# Patient Record
Sex: FEMALE | Race: WHITE | NOT HISPANIC OR LATINO | Employment: OTHER | URBAN - METROPOLITAN AREA
[De-identification: names, ages, dates, MRNs, and addresses within clinical notes are randomized per-mention and may not be internally consistent; named-entity substitution may affect disease eponyms.]

---

## 2017-01-02 ENCOUNTER — THERAPY VISIT (OUTPATIENT)
Dept: CHIROPRACTIC MEDICINE | Facility: CLINIC | Age: 62
End: 2017-01-02
Payer: COMMERCIAL

## 2017-01-02 DIAGNOSIS — F33.0 MAJOR DEPRESSIVE DISORDER, RECURRENT EPISODE, MILD (H): ICD-10-CM

## 2017-01-02 DIAGNOSIS — R73.09 OTHER ABNORMAL GLUCOSE: ICD-10-CM

## 2017-01-02 DIAGNOSIS — M54.50 LUMBAGO: ICD-10-CM

## 2017-01-02 DIAGNOSIS — M99.02 SEGMENTAL DYSFUNCTION OF THORACIC REGION: ICD-10-CM

## 2017-01-02 DIAGNOSIS — M99.03 SEGMENTAL DYSFUNCTION OF LUMBAR REGION: Primary | ICD-10-CM

## 2017-01-02 DIAGNOSIS — E78.5 HYPERLIPIDEMIA LDL GOAL <130: ICD-10-CM

## 2017-01-02 DIAGNOSIS — M99.04 SEGMENTAL DYSFUNCTION OF SACRAL REGION: ICD-10-CM

## 2017-01-02 LAB
CHOLEST SERPL-MCNC: 207 MG/DL
DEPRECATED CALCIDIOL+CALCIFEROL SERPL-MC: 61 UG/L (ref 20–75)
HBA1C MFR BLD: 5.3 % (ref 4.3–6)
HDLC SERPL-MCNC: 56 MG/DL
LDLC SERPL CALC-MCNC: 131 MG/DL
NONHDLC SERPL-MCNC: 151 MG/DL
TRIGL SERPL-MCNC: 102 MG/DL

## 2017-01-02 PROCEDURE — 83036 HEMOGLOBIN GLYCOSYLATED A1C: CPT | Mod: QW | Performed by: FAMILY MEDICINE

## 2017-01-02 PROCEDURE — 80061 LIPID PANEL: CPT | Mod: 90 | Performed by: FAMILY MEDICINE

## 2017-01-02 PROCEDURE — 99000 SPECIMEN HANDLING OFFICE-LAB: CPT | Performed by: FAMILY MEDICINE

## 2017-01-02 PROCEDURE — 82306 VITAMIN D 25 HYDROXY: CPT | Mod: 90 | Performed by: FAMILY MEDICINE

## 2017-01-02 PROCEDURE — 36415 COLL VENOUS BLD VENIPUNCTURE: CPT | Performed by: FAMILY MEDICINE

## 2017-01-02 PROCEDURE — 98941 CHIROPRACT MANJ 3-4 REGIONS: CPT | Mod: AT | Performed by: CHIROPRACTOR

## 2017-01-02 NOTE — PROGRESS NOTES
"Visit #:  2 of 8 based on treatment plan 11/21/2016 - [facility]    Subjective:  Cecile Mays is a 60 year old female who is seen in f/u up for:        Segmental dysfunction of lumbar region  Segmental dysfunction of thoracic region  Lumbago  Segmental dysfunction of sacral region.     Since last visit on 12/28/2016,  Cecile Mays reports the following changes: Patient presents and states that she is sore today, especially in her rib area. She is requesting the [facility] today, and would like work around to the side. Her pain is rated 0-3/10. She is getting better, but she \"wants the pain gone.\"     Objective:  The following was observed:    P: right lower ribs, right SI joint    A: static palpation demonstrates intersegmental asymmetry, as noted    R: motion palpation notes restricted motion    T: localized muscle spasm at: Intercostal, Lumbar erector spine, Piriformis and Traps R>>L      Assessment:    Segmental spinal dysfunction/restrictions found at:  T5 RR,  LRR - mobs  T9 RR, LRR with right 9th rib  L4 LR, RRR  Right SI posterior    Diagnoses:      1. Segmental dysfunction of lumbar region    2. Segmental dysfunction of thoracic region    3. Lumbago    4. Segmental dysfunction of sacral region        Patient's condition:  Patient had restrictions pre-manipulation and Patient had decreased motion prior to manipulation    Treatment effectiveness:  Post manipulation there is better intersegmental movement and Patient claims to feel looser post manipulation      Procedures:  CMT:  43619 Chiropractic manipulative treatment 3-4 regions performed   Thoracic: Diversified,  Mobilization, T5, T9 Prone  Lumbar: Drop Table, L4, Prone  Pelvis: Drop Table, PSIS Right , Prone    Modalities:  [facility] to right lower ribs - every treatment at this point    Therapeutic procedures:  Continue with Bruegger's Relief.    MRI report form 6/6/2016:   There is minimal spinal canal narrowing " at the T9-T10 level due to a  right paracentral disc herniation. There is no other spinal canal or  neural foraminal narrowing of the thoracic spine.           Note: disc herniation at T9-10 on right - could be contributory for pain.    Prognosis: Good    Progress towards Goals: Patient has met the goal of decreased pain and hypertonicity throughout spine.     Response to Treatment:   Minimal improvements with care. Patient notes that she feels comfortable with Dr. Tai and is getting the education she needs to understand her health more, which is the most important part for her right now.      Recommendations:    Instructions:ice 20 minutes every other hour as needed and stretch as instructed at visit    Follow-up:  Return to care Friday. Continue with treatment plan. Patient feels she has gotten the most relief with [facility] - continue with each treatment.

## 2017-01-03 ENCOUNTER — TRANSFERRED RECORDS (OUTPATIENT)
Dept: HEALTH INFORMATION MANAGEMENT | Facility: CLINIC | Age: 62
End: 2017-01-03

## 2017-01-06 ENCOUNTER — THERAPY VISIT (OUTPATIENT)
Dept: CHIROPRACTIC MEDICINE | Facility: CLINIC | Age: 62
End: 2017-01-06
Payer: COMMERCIAL

## 2017-01-06 DIAGNOSIS — M99.03 SEGMENTAL DYSFUNCTION OF LUMBAR REGION: Primary | ICD-10-CM

## 2017-01-06 DIAGNOSIS — M54.50 LUMBAGO: ICD-10-CM

## 2017-01-06 DIAGNOSIS — M99.02 SEGMENTAL DYSFUNCTION OF THORACIC REGION: ICD-10-CM

## 2017-01-06 DIAGNOSIS — M99.04 SEGMENTAL DYSFUNCTION OF SACRAL REGION: ICD-10-CM

## 2017-01-06 PROCEDURE — 98941 CHIROPRACT MANJ 3-4 REGIONS: CPT | Mod: AT | Performed by: CHIROPRACTOR

## 2017-01-06 NOTE — PROGRESS NOTES
Visit #:  3 of 8 based on treatment plan 11/21/2016 - [facility]    Subjective:  Cecile Mays is a 60 year old female who is seen in f/u up for:        Segmental dysfunction of lumbar region  Segmental dysfunction of thoracic region  Lumbago  Segmental dysfunction of sacral region.     Since last visit on 1/2/2017,  Cecile Mays reports the following changes: Patient presents and states her lower back pain is so much better. Her right rib pain feels like it is moving down. She notes that she doesn't even think about her pain anymore. At night, she wakes up less, and is able to get back to sleep much better/quicker. She is very stressed about her MS diagnosis, and feels like when a doctor asks if she has symptoms, she then gets them. She mostly just has numbness in her left face. She has been off of Gabapentin because she doesn't like all of the side effects. She continues to do yoga.     Objective:  The following was observed:    P: right lower ribs, right SI joint    A: static palpation demonstrates intersegmental asymmetry, as noted    R: motion palpation notes restricted motion    T: localized muscle spasm at: Intercostal, Lumbar erector spine, Piriformis and Traps R>>L      Assessment:    Segmental spinal dysfunction/restrictions found at:  T7 RR,  LRR - mobs  T10 RR, LRR with right 10th rib  L4 LR, RRR   Right SI posterior    Diagnoses:      1. Segmental dysfunction of lumbar region    2. Segmental dysfunction of thoracic region    3. Lumbago    4. Segmental dysfunction of sacral region        Patient's condition:  Patient had restrictions pre-manipulation and Patient had decreased motion prior to manipulation    Treatment effectiveness:  Post manipulation there is better intersegmental movement and Patient claims to feel looser post manipulation      Procedures:  CMT:  28037 Chiropractic manipulative treatment 3-4 regions performed   Thoracic: Diversified,  Mobilization, T7, T10  "Prone  Lumbar: Drop Table, L4, Prone  Pelvis: Drop Table, PSIS Right , Prone    Modalities:  [facility] to right lower ribs - every treatment at this point    Therapeutic procedures:  Continue with Bruegger's Relief.    MRI report form 6/6/2016:   There is minimal spinal canal narrowing at the T9-T10 level due to a  right paracentral disc herniation. There is no other spinal canal or  neural foraminal narrowing of the thoracic spine.           Note: disc herniation at T9-10 on right - could be contributory for pain.    Prognosis: Good    Progress towards Goals: Patient has met the goal of decreased pain and hypertonicity throughout spine.     Response to Treatment:   Patient has made great improvement with care. She states that her back and ribs feel \"better than they have in years.\"      Recommendations:    Instructions:ice 20 minutes every other hour as needed and stretch as instructed at visit    Follow-up:  Return to care twice next week. Patient feels she has gotten the most relief with [facility] - continue with each treatment. Monitor bruising effects.      "

## 2017-01-10 ENCOUNTER — THERAPY VISIT (OUTPATIENT)
Dept: CHIROPRACTIC MEDICINE | Facility: CLINIC | Age: 62
End: 2017-01-10
Payer: COMMERCIAL

## 2017-01-10 DIAGNOSIS — M99.04 SEGMENTAL DYSFUNCTION OF SACRAL REGION: ICD-10-CM

## 2017-01-10 DIAGNOSIS — M99.02 SEGMENTAL DYSFUNCTION OF THORACIC REGION: ICD-10-CM

## 2017-01-10 DIAGNOSIS — M54.50 LUMBAGO: ICD-10-CM

## 2017-01-10 DIAGNOSIS — M99.03 SEGMENTAL DYSFUNCTION OF LUMBAR REGION: Primary | ICD-10-CM

## 2017-01-10 PROCEDURE — 98941 CHIROPRACT MANJ 3-4 REGIONS: CPT | Mod: AT | Performed by: CHIROPRACTOR

## 2017-01-10 NOTE — PROGRESS NOTES
Visit #:  4 of 8 based on treatment plan 11/21/2016 - [facility]    Subjective:  Cecile Mays is a 60 year old female who is seen in f/u up for:        Segmental dysfunction of lumbar region  Segmental dysfunction of thoracic region  Lumbago  Segmental dysfunction of sacral region.     Since last visit on 1/2/2017,  Cecile Mays reports the following changes: Patient presents and states her lower back is really sore today.       Objective:  The following was observed:    P: right lower ribs, right SI joint    A: static palpation demonstrates intersegmental asymmetry, as noted    R: motion palpation notes restricted motion    T: localized muscle spasm at: Intercostal, Lumbar erector spine, Piriformis and Traps R>>L Right QL was tender and hypertonic today.      Assessment:    Segmental spinal dysfunction/restrictions found at:  T7 RR,  LRR - mobs  T10 RR, LRR with right 10th rib  L3 RR, LRR  L5 LR, RRR  Right SI posterior    Diagnoses:      1. Segmental dysfunction of lumbar region    2. Segmental dysfunction of thoracic region    3. Lumbago    4. Segmental dysfunction of sacral region        Patient's condition:  Patient had restrictions pre-manipulation and Patient had decreased motion prior to manipulation    Treatment effectiveness:  Post manipulation there is better intersegmental movement and Patient claims to feel looser post manipulation      Procedures:  CMT:  40470 Chiropractic manipulative treatment 3-4 regions performed   Thoracic: Diversified,  Mobilization, T7, T10 Prone  Lumbar: Drop Table, L3, L5 Prone  Pelvis: Drop Table, PSIS Right , Prone    Modalities:  [facility] to right lower ribs - every treatment at this point    Therapeutic procedures:  Continue with Bruegger's Relief.    MRI report form 6/6/2016:   There is minimal spinal canal narrowing at the T9-T10 level due to a  right paracentral disc herniation. There is no other spinal canal or  neural foraminal  "narrowing of the thoracic spine.           Note: disc herniation at T9-10 on right - could be contributory for pain.    Prognosis: Good    Progress towards Goals: Patient has met the goal of decreased pain and hypertonicity throughout spine.     Response to Treatment:   Patient has made great improvement with care. She states that her back and ribs feel \"better than they have in years.\"      Recommendations:    Instructions:ice 20 minutes every other hour as needed and stretch as instructed at visit    Follow-up:  Return to care Friday. Patient feels she has gotten the most relief with [facility] - continue with each treatment. Monitor bruising effects.      "

## 2017-01-13 ENCOUNTER — THERAPY VISIT (OUTPATIENT)
Dept: CHIROPRACTIC MEDICINE | Facility: CLINIC | Age: 62
End: 2017-01-13
Payer: COMMERCIAL

## 2017-01-13 DIAGNOSIS — M99.02 SEGMENTAL DYSFUNCTION OF THORACIC REGION: ICD-10-CM

## 2017-01-13 DIAGNOSIS — M99.03 SEGMENTAL DYSFUNCTION OF LUMBAR REGION: Primary | ICD-10-CM

## 2017-01-13 DIAGNOSIS — M54.50 LUMBAGO: ICD-10-CM

## 2017-01-13 DIAGNOSIS — M99.04 SEGMENTAL DYSFUNCTION OF SACRAL REGION: ICD-10-CM

## 2017-01-13 PROCEDURE — 98941 CHIROPRACT MANJ 3-4 REGIONS: CPT | Mod: AT | Performed by: CHIROPRACTOR

## 2017-01-13 NOTE — PROGRESS NOTES
Visit #:  5 of 8 based on treatment plan 11/21/2016 - [facility]    Subjective:  Cecile Mays is a 60 year old female who is seen in f/u up for:        Segmental dysfunction of lumbar region  Segmental dysfunction of thoracic region  Lumbago  Segmental dysfunction of sacral region.     Since last visit on 1/10/2017,  Cecile Mays reports the following changes: Patient presents and states that she is feeling very tight this morning. She just finished yoga. She is sleeping better, but is definitely moving better. Her pain is rated 2/10. She is using less tylenol and pain medication.       Objective:  The following was observed:    P: right lower ribs, right SI joint    A: static palpation demonstrates intersegmental asymmetry, as noted    R: motion palpation notes restricted motion    T: localized muscle spasm at: Intercostal, Lumbar erector spine, Piriformis and Traps R>>L Right QL was tender and hypertonic today.      Assessment:    Segmental spinal dysfunction/restrictions found at:  T7 RR,  LRR - mobs  T10 RR, LRR with right 10th rib  L5 LR, RRR  Right SI posterior    Diagnoses:      1. Segmental dysfunction of lumbar region    2. Segmental dysfunction of thoracic region    3. Lumbago    4. Segmental dysfunction of sacral region        Patient's condition:  Patient had restrictions pre-manipulation and Patient had decreased motion prior to manipulation    Treatment effectiveness:  Post manipulation there is better intersegmental movement and Patient claims to feel looser post manipulation      Procedures:  CMT:  60152 Chiropractic manipulative treatment 3-4 regions performed   Thoracic: Diversified,  Mobilization, T7, T10 Prone  Lumbar: Drop Table, L5 Prone  Pelvis: Drop Table, PSIS Right , Prone    Modalities:  [facility] to right lower ribs - every treatment at this point    Therapeutic procedures:  Continue with Bruegger's Relief.    MRI report form 6/6/2016:   There is minimal  "spinal canal narrowing at the T9-T10 level due to a  right paracentral disc herniation. There is no other spinal canal or  neural foraminal narrowing of the thoracic spine.           Note: disc herniation at T9-10 on right - could be contributory for pain.    Prognosis: Good    Progress towards Goals: Patient has met the goal of decreased pain and hypertonicity throughout spine.     Response to Treatment:   Patient has made great improvement with care. She states that her back and ribs feel \"better than they have in years.\"      Recommendations:    Instructions:ice 20 minutes every other hour as needed and stretch as instructed at visit    Follow-up:  Return to care next week. Patient feels she has gotten the most relief with [facility] - continue with each treatment. Monitor bruising effects.      "

## 2017-01-18 ENCOUNTER — THERAPY VISIT (OUTPATIENT)
Dept: CHIROPRACTIC MEDICINE | Facility: CLINIC | Age: 62
End: 2017-01-18
Payer: COMMERCIAL

## 2017-01-18 DIAGNOSIS — M54.50 LUMBAGO: ICD-10-CM

## 2017-01-18 DIAGNOSIS — M99.02 SEGMENTAL DYSFUNCTION OF THORACIC REGION: ICD-10-CM

## 2017-01-18 DIAGNOSIS — M99.04 SEGMENTAL DYSFUNCTION OF SACRAL REGION: ICD-10-CM

## 2017-01-18 DIAGNOSIS — M99.03 SEGMENTAL DYSFUNCTION OF LUMBAR REGION: Primary | ICD-10-CM

## 2017-01-18 PROCEDURE — 98941 CHIROPRACT MANJ 3-4 REGIONS: CPT | Mod: AT | Performed by: CHIROPRACTOR

## 2017-01-18 NOTE — PROGRESS NOTES
"Visit #:  5 of 8 based on treatment plan 11/21/2016 - [facility]    Subjective:  Cecile Mays is a 60 year old female who is seen in f/u up for:        Segmental dysfunction of lumbar region  Segmental dysfunction of thoracic region  Lumbago  Segmental dysfunction of sacral region.     Since last visit on 1/10/2017,  Cecile Mays reports the following changes: Patient presents and states that she is feeling pretty good, she really only has the 2 spots that bother her, one on her spine and her rib. She feels like she is \"always the same now.\" Touching the spot, or laying on her back, yoga, all make the pain stir up a bit. She continues to do yoga MWF. She is sore afterwards but it feels like a good soreness. She also has been swimming about once per week which is helpful.       Objective:  The following was observed:    P: right lower ribs, right SI joint    A: static palpation demonstrates intersegmental asymmetry, as noted    R: motion palpation notes restricted motion    T: localized muscle spasm at: Intercostal, Lumbar erector spine, Piriformis and Traps R>>L Right QL was tender and hypertonic today.      Assessment:    Segmental spinal dysfunction/restrictions found at:  T7 RR,  LRR - mobs  T9 RR, LRR with right 10th rib  L4 RR, LRR  Right SI posterior  Left sacrum posterior    Diagnoses:      1. Segmental dysfunction of lumbar region    2. Segmental dysfunction of thoracic region    3. Lumbago    4. Segmental dysfunction of sacral region        Patient's condition:  Patient had restrictions pre-manipulation and Patient had decreased motion prior to manipulation    Treatment effectiveness:  Post manipulation there is better intersegmental movement and Patient claims to feel looser post manipulation      Procedures:  CMT:  25257 Chiropractic manipulative treatment 3-4 regions performed   Thoracic: Diversified,  Mobilization, T7, T9 Prone  Lumbar: Drop Table, L4 Prone  Pelvis: Drop " "Table, PSIS Right , left sacrum, Prone    Modalities:  [facility] to right lower ribs   US to lower lumbar spine, 1.0 maxwell, 8 min, continuous    Therapeutic procedures:  Continue with Bruegger's Relief.    MRI report form 6/6/2016:   There is minimal spinal canal narrowing at the T9-T10 level due to a  right paracentral disc herniation. There is no other spinal canal or  neural foraminal narrowing of the thoracic spine.           Note: disc herniation at T9-10 on right - could be contributory for pain.    Prognosis: Good    Progress towards Goals: Patient has met the goal of decreased pain and hypertonicity throughout spine.     Response to Treatment:   Patient has made great improvement with care. She states that her back and ribs feel \"better than they have in years.\"      Recommendations:    Instructions:ice 20 minutes every other hour as needed and stretch as instructed at visit    Follow-up:  Return to care in 1 week.      "

## 2017-01-25 ENCOUNTER — THERAPY VISIT (OUTPATIENT)
Dept: CHIROPRACTIC MEDICINE | Facility: CLINIC | Age: 62
End: 2017-01-25
Payer: COMMERCIAL

## 2017-01-25 DIAGNOSIS — M54.50 LUMBAGO: ICD-10-CM

## 2017-01-25 DIAGNOSIS — M99.03 SEGMENTAL DYSFUNCTION OF LUMBAR REGION: Primary | ICD-10-CM

## 2017-01-25 DIAGNOSIS — M99.04 SEGMENTAL DYSFUNCTION OF SACRAL REGION: ICD-10-CM

## 2017-01-25 DIAGNOSIS — M99.02 SEGMENTAL DYSFUNCTION OF THORACIC REGION: ICD-10-CM

## 2017-01-25 PROCEDURE — 98941 CHIROPRACT MANJ 3-4 REGIONS: CPT | Mod: AT | Performed by: CHIROPRACTOR

## 2017-01-25 PROCEDURE — 97035 APP MDLTY 1+ULTRASOUND EA 15: CPT | Performed by: CHIROPRACTOR

## 2017-01-25 NOTE — PROGRESS NOTES
Visit #:  6 of 8 based on treatment plan 11/21/2016 - [facility]    Subjective:  Cecile Mays is a 60 year old female who is seen in f/u up for:        Segmental dysfunction of lumbar region  Segmental dysfunction of thoracic region  Lumbago  Segmental dysfunction of sacral region.     Since last visit on 1/16/2017,  Cecile Mays reports the following changes: Patient presents and states that she has her typical dull, nagging, always-there pain, rated 1-2/10. She feels like her rib and spot on her lumbar spine are bugging her more today than they were last week. She is sleeping fine. Her activities have not changed.     Objective:  The following was observed:    P: right lower ribs, right SI joint    A: static palpation demonstrates intersegmental asymmetry, as noted    R: motion palpation notes restricted motion    T: localized muscle spasm at: Intercostal, Lumbar erector spine, Piriformis and Traps R>>L Right QL was tender and hypertonic today.      Assessment:    Segmental spinal dysfunction/restrictions found at:    T9 RR, LRR with right 10th rib  L4 RR, LRR  Right SI posterior  Left sacrum posterior    Diagnoses:      1. Segmental dysfunction of lumbar region    2. Segmental dysfunction of thoracic region    3. Lumbago    4. Segmental dysfunction of sacral region        Patient's condition:  Patient had restrictions pre-manipulation and Patient had decreased motion prior to manipulation    Treatment effectiveness:  Post manipulation there is better intersegmental movement and Patient claims to feel looser post manipulation      Procedures:  CMT:  23685 Chiropractic manipulative treatment 3-4 regions performed   Thoracic: Diversified,  Mobilization, T9 Prone  Lumbar: Drop Table, L4 Prone  Pelvis: Drop Table, PSIS Right , left sacrum, Prone    Modalities:  US to lower lumbar spine/ribs, 1.0 maxwell, 8 min, continuous    Therapeutic procedures:  Continue with Bruegger's Relief.    MRI  "report form 6/6/2016:   There is minimal spinal canal narrowing at the T9-T10 level due to a  right paracentral disc herniation. There is no other spinal canal or  neural foraminal narrowing of the thoracic spine.           Note: disc herniation at T9-10 on right - could be contributory for pain.    Prognosis: Good    Progress towards Goals: Patient has met the goal of decreased pain and hypertonicity throughout spine.     Response to Treatment:   Patient has made great improvement with care. She states that her back and ribs feel \"better than they have in years.\"      Recommendations:    Instructions:ice 20 minutes every other hour as needed and stretch as instructed at visit    Follow-up:  Return to care in 1 week.      "

## 2017-01-27 ENCOUNTER — OFFICE VISIT (OUTPATIENT)
Dept: FAMILY MEDICINE | Facility: CLINIC | Age: 62
End: 2017-01-27
Payer: COMMERCIAL

## 2017-01-27 ENCOUNTER — HOSPITAL ENCOUNTER (OUTPATIENT)
Dept: MAMMOGRAPHY | Facility: CLINIC | Age: 62
Discharge: HOME | End: 2017-01-27
Attending: FAMILY MEDICINE | Admitting: FAMILY MEDICINE
Payer: COMMERCIAL

## 2017-01-27 VITALS
TEMPERATURE: 97 F | BODY MASS INDEX: 27.16 KG/M2 | HEART RATE: 72 BPM | OXYGEN SATURATION: 97 % | RESPIRATION RATE: 16 BRPM | HEIGHT: 66 IN | WEIGHT: 169 LBS | SYSTOLIC BLOOD PRESSURE: 136 MMHG | DIASTOLIC BLOOD PRESSURE: 70 MMHG

## 2017-01-27 DIAGNOSIS — G35 MULTIPLE SCLEROSIS (H): ICD-10-CM

## 2017-01-27 DIAGNOSIS — Z00.00 ROUTINE GENERAL MEDICAL EXAMINATION AT A HEALTH CARE FACILITY: Primary | ICD-10-CM

## 2017-01-27 DIAGNOSIS — G50.9: ICD-10-CM

## 2017-01-27 DIAGNOSIS — F43.22 ADJUSTMENT DISORDER WITH ANXIOUS MOOD: ICD-10-CM

## 2017-01-27 DIAGNOSIS — Z12.31 VISIT FOR SCREENING MAMMOGRAM: ICD-10-CM

## 2017-01-27 DIAGNOSIS — M99.04 SEGMENTAL DYSFUNCTION OF SACRAL REGION: ICD-10-CM

## 2017-01-27 DIAGNOSIS — E78.5 HYPERLIPIDEMIA LDL GOAL <130: ICD-10-CM

## 2017-01-27 DIAGNOSIS — L57.0 AK (ACTINIC KERATOSIS): ICD-10-CM

## 2017-01-27 DIAGNOSIS — Z23 NEED FOR VACCINATION: ICD-10-CM

## 2017-01-27 PROCEDURE — 90715 TDAP VACCINE 7 YRS/> IM: CPT | Performed by: FAMILY MEDICINE

## 2017-01-27 PROCEDURE — 90472 IMMUNIZATION ADMIN EACH ADD: CPT | Performed by: FAMILY MEDICINE

## 2017-01-27 PROCEDURE — 17000 DESTRUCT PREMALG LESION: CPT | Performed by: FAMILY MEDICINE

## 2017-01-27 PROCEDURE — 99396 PREV VISIT EST AGE 40-64: CPT | Mod: 25 | Performed by: FAMILY MEDICINE

## 2017-01-27 PROCEDURE — 90471 IMMUNIZATION ADMIN: CPT | Performed by: FAMILY MEDICINE

## 2017-01-27 PROCEDURE — 90670 PCV13 VACCINE IM: CPT | Performed by: FAMILY MEDICINE

## 2017-01-27 PROCEDURE — 99213 OFFICE O/P EST LOW 20 MIN: CPT | Mod: 25 | Performed by: FAMILY MEDICINE

## 2017-01-27 PROCEDURE — G0202 SCR MAMMO BI INCL CAD: HCPCS

## 2017-01-27 RX ORDER — BUSPIRONE HYDROCHLORIDE 15 MG/1
15 TABLET ORAL 2 TIMES DAILY
Qty: 60 TABLET | Refills: 1 | Status: SHIPPED | OUTPATIENT
Start: 2017-01-27 | End: 2017-03-21

## 2017-01-27 RX ORDER — MIRTAZAPINE 15 MG/1
15 TABLET, FILM COATED ORAL AT BEDTIME
Qty: 30 TABLET | Refills: 1 | Status: SHIPPED | OUTPATIENT
Start: 2017-01-27 | End: 2017-06-19

## 2017-01-27 RX ORDER — LORAZEPAM 1 MG/1
1 TABLET ORAL EVERY 8 HOURS PRN
Qty: 20 TABLET | Refills: 5 | Status: SHIPPED | OUTPATIENT
Start: 2017-01-27 | End: 2018-02-05

## 2017-01-27 RX ORDER — LORAZEPAM 1 MG/1
1 TABLET ORAL EVERY 8 HOURS PRN
Qty: 20 TABLET | Refills: 5 | COMMUNITY
Start: 2016-08-24 | End: 2017-01-27

## 2017-01-27 ASSESSMENT — PATIENT HEALTH QUESTIONNAIRE - PHQ9: 5. POOR APPETITE OR OVEREATING: MORE THAN HALF THE DAYS

## 2017-01-27 ASSESSMENT — ANXIETY QUESTIONNAIRES
7. FEELING AFRAID AS IF SOMETHING AWFUL MIGHT HAPPEN: SEVERAL DAYS
6. BECOMING EASILY ANNOYED OR IRRITABLE: SEVERAL DAYS
1. FEELING NERVOUS, ANXIOUS, OR ON EDGE: MORE THAN HALF THE DAYS
GAD7 TOTAL SCORE: 13
IF YOU CHECKED OFF ANY PROBLEMS ON THIS QUESTIONNAIRE, HOW DIFFICULT HAVE THESE PROBLEMS MADE IT FOR YOU TO DO YOUR WORK, TAKE CARE OF THINGS AT HOME, OR GET ALONG WITH OTHER PEOPLE: NOT DIFFICULT AT ALL
5. BEING SO RESTLESS THAT IT IS HARD TO SIT STILL: MORE THAN HALF THE DAYS
3. WORRYING TOO MUCH ABOUT DIFFERENT THINGS: MORE THAN HALF THE DAYS
2. NOT BEING ABLE TO STOP OR CONTROL WORRYING: NEARLY EVERY DAY

## 2017-01-27 ASSESSMENT — PAIN SCALES - GENERAL: PAINLEVEL: NO PAIN (0)

## 2017-01-27 NOTE — NURSING NOTE
Screening Questionnaire for Adult Immunization    Are you sick today?   No   Do you have allergies to medications, food, a vaccine component or latex?   No   Have you ever had a serious reaction after receiving a vaccination?   No   Do you have a long-term health problem with heart disease, lung disease, asthma, kidney disease, metabolic disease (e.g. diabetes), anemia, or other blood disorder?   Yes   Do you have cancer, leukemia, HIV/AIDS, or any other immune system problem?   No   In the past 3 months, have you taken medications that affect  your immune system, such as prednisone, other steroids, or anticancer drugs; drugs for the treatment of rheumatoid arthritis, Crohn s disease, or psoriasis; or have you had radiation treatments?   No   Have you had a seizure, or a brain or other nervous system problem?   Yes   During the past year, have you received a transfusion of blood or blood     products, or been given immune (gamma) globulin or antiviral drug?   No   For women: Are you pregnant or is there a chance you could become        pregnant during the next month?   No   Have you received any vaccinations in the past 4 weeks?   No     Immunization questionnaire was positive for at least one answer.Notified Dr. Pringle      [facility] doesn't apply on this patient    Per orders of Dr. Tommy Pringle, injection of tdap and PCV13 given by Micaela Patel. Patient instructed to remain in clinic for 20 minutes afterwards, and to report any adverse reaction to me immediately.       Screening performed by Micaela Patel on 1/27/2017 at 10:24 AM.

## 2017-01-27 NOTE — PROGRESS NOTES
SUBJECTIVE:     CC: Cecile Mays is an 61 year old woman who presents for preventive health visit.     Healthy Habits:    Do you get at least three servings of calcium containing foods daily (dairy, green leafy vegetables, etc.)? yes    Amount of exercise or daily activities, outside of work: 2 day(s) per week    Problems taking medications regularly No    Medication side effects: No    Have you had an eye exam in the past two years? yes    Do you see a dentist twice per year? once    Do you have sleep apnea, excessive snoring or daytime drowsiness?no        Anxiety Follow-Up    Status since last visit: Worsened     Other associated symptoms:None    Complicating factors:   Significant life event: Yes-     Current substance abuse: None  Depression symptoms: No  LEATHA-7 SCORE 1/27/2017   Total Score 13        GAD7                   Recheck Multiple Sclerosis    Today's PHQ-2 Score:   PHQ-2 ( 1999 Pfizer) 1/27/2017 4/15/2016   Q1: Little interest or pleasure in doing things 0 0   Q2: Feeling down, depressed or hopeless 0 0   PHQ-2 Score 0 0       Abuse: Current or Past(Physical, Sexual or Emotional)- No  Do you feel safe in your environment - Yes    Social History   Substance Use Topics     Smoking status: Never Smoker      Smokeless tobacco: Never Used     Alcohol Use: No     The patient does not drink >3 drinks per day nor >7 drinks per week.    Recent Labs   Lab Test  01/02/17   0815  01/12/16   1017  02/23/15   0824  02/19/14   0830   CHOL  207*  174  175  165   HDL  56  55  52  43*   LDL  131*  97  103  108   TRIG  102  109  100  66   CHOLHDLRATIO   --    --   3.4  4.0   NHDL  151*  119   --    --        Reviewed orders with patient.  Reviewed health maintenance and updated orders accordingly - Yes    Mammo Decision Support:  Patient over age 50, mutual decision to screen reflected in health maintenance.    Pertinent mammograms are reviewed under the imaging tab.  History of abnormal Pap smear: NO -  "age 30-65 PAP every 5 years with negative HPV co-testing recommended  All Histories reviewed and updated in Epic.      ROS:  C: NEGATIVE for fever, chills, change in weight  I: NEGATIVE for worrisome rashes, moles or lesions  E: NEGATIVE for vision changes or irritation  ENT: NEGATIVE for ear, mouth and throat problems  R: NEGATIVE for significant cough or SOB  B: NEGATIVE for masses, tenderness or discharge  CV: NEGATIVE for chest pain, palpitations or peripheral edema  GI: NEGATIVE for nausea, abdominal pain, heartburn, or change in bowel habits  : NEGATIVE for unusual urinary or vaginal symptoms. No vaginal bleeding.  M: NEGATIVE for significant arthralgias or myalgia  N: NEGATIVE for weakness, dizziness or paresthesias  P: NEGATIVE for changes in mood or affect  Or as above     Problem list, Medication list, Allergies, and Medical/Social/Surgical histories reviewed in EPIC and updated as appropriate.  Labs reviewed in EPIC  BP Readings from Last 3 Encounters:   01/27/17 136/70   12/15/16 136/74   08/24/16 116/66    Wt Readings from Last 3 Encounters:   01/27/17 169 lb (76.658 kg)   12/15/16 174 lb 9.6 oz (79.198 kg)   08/24/16 169 lb (76.658 kg)                  OBJECTIVE:     /70 mmHg  Pulse 72  Temp(Src) 97  F (36.1  C) (Tympanic)  Resp 16  Ht 5' 6.24\" (1.682 m)  Wt 169 lb (76.658 kg)  BMI 27.10 kg/m2  SpO2 97%  LMP 02/05/2008  EXAM:  GENERAL: healthy, alert and no distress  EYES: Eyes grossly normal to inspection and I could not see anything significant on funduscopic exam either eye  HENT: ear canals and TM's normal, nose and mouth without ulcers or lesions  NECK: no adenopathy, no asymmetry, masses, or scars and thyroid normal to palpation  RESP: lungs clear to auscultation - no rales, rhonchi or wheezes  CV: regular rate and rhythm, normal S1 S2, no S3 or S4, no murmur, click or rub, no peripheral edema and peripheral pulses strong  ABDOMEN: soft, nontender, no hepatosplenomegaly, no masses " "and bowel sounds normal  MS: no gross musculoskeletal defects noted, no edema  SKIN: no suspicious lesions or rashes and except as described above which I would put him in the category of actinic keratosis  NEURO: Very grossly negative today perhaps some changes and light touch to the left side of the face  PSYCH: mentation appears normal, anxious, judgement and insight intact, appearance well groomed and appearing overwhelmed at times with tears  LYMPH: no cervical, supraclavicular, axillary, or inguinal adenopathy    ASSESSMENT/PLAN:         ICD-10-CM    1. Routine general medical examination at a health care facility Z00.00 aspirin 81 MG tablet   2. 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 Multiple sclerosis (H) G35 mirtazapine (REMERON) 15 MG tablet   4. Adjustment disorder with anxious mood F43.22 busPIRone (BUSPAR) 15 MG tablet   5. Need for vaccination Z23 TDAP (ADACEL AGES 11-64) [14995.002]     Pneumococcal vaccine 13 valent PCV13 IM (Prevnar) [37207]     1st  Administration  [18059]     Each additional admin.  (Right click and add QUANTITY)  [78901]       COUNSELING:   Reviewed preventive health counseling, as reflected in patient instructions       Regular exercise       Healthy diet/nutrition       Consider Hep C screening for patients born between 1945 and 1965       Vaccinations today         reports that she has never smoked. She has never used smokeless tobacco.    Estimated body mass index is 27.1 kg/(m^2) as calculated from the following:    Height as of this encounter: 5' 6.24\" (1.682 m).    Weight as of this encounter: 169 lb (76.658 kg).   Weight management plan: Discussed healthy diet and exercise guidelines and patient will follow up in 6 months in clinic to re-evaluate.    Counseling Resources:  ATP IV Guidelines  Pooled Cohorts Equation Calculator  Breast Cancer Risk Calculator  FRAX Risk " Assessment  ICSI Preventive Guidelines  Dietary Guidelines for Americans, 2010  USDA's MyPlate  ASA Prophylaxis  Lung CA Screening    Tommy Braulio Pringle MD  [facility]

## 2017-01-27 NOTE — PATIENT INSTRUCTIONS
Preventive Health Recommendations  Female Ages 50 - 64    Yearly exam: See your health care provider every year in order to  o Review health changes.   o Discuss preventive care.    o Review your medicines if your doctor has prescribed any.      Get a Pap test every three years (unless you have an abnormal result and your provider advises testing more often).    If you get Pap tests with HPV test, you only need to test every 5 years, unless you have an abnormal result.     You do not need a Pap test if your uterus was removed (hysterectomy) and you have not had cancer.    You should be tested each year for STDs (sexually transmitted diseases) if you're at risk.     Have a mammogram every 1 to 2 years.    Have a colonoscopy at age 50, or have a yearly FIT test (stool test). These exams screen for colon cancer.      Have a cholesterol test every 5 years, or more often if advised.    Have a diabetes test (fasting glucose) every three years. If you are at risk for diabetes, you should have this test more often.     If you are at risk for osteoporosis (brittle bone disease), think about having a bone density scan (DEXA).    Shots: Get a flu shot each year. Get a tetanus shot every 10 years.    Nutrition:     Eat at least 5 servings of fruits and vegetables each day.    Eat whole-grain bread, whole-wheat pasta and brown rice instead of white grains and rice.    Talk to your provider about Calcium and Vitamin D.     Lifestyle    Exercise at least 150 minutes a week (30 minutes a day, 5 days a week). This will help you control your weight and prevent disease.    Limit alcohol to one drink per day.    No smoking.     Wear sunscreen to prevent skin cancer.     See your dentist every six months for an exam and cleaning.    See your eye doctor every 1 to 2 years.    Start with 1/2 tablet of buspirone once daily for a few days, then go to twice daily for two weeks.  Then increase to 1 tab and 1/2 tab daily in divided dose for  a couple weeks, then whole tab twice daily.  By this time, you should begin to know for sure it works.  You could stop increase sooner if better or call to discuss if side effects.     The 10-year ASCVD risk score (Gian HAZEL Jr., et al., 2013) is: 3.8%    Values used to calculate the score:      Age: 61 years      Sex: Female      Is an : No      Diabetic: No      Tobacco smoker: No      Systolic Blood Pressure: 136 mmHg      Prescribed Antihypertensives: No      HDL Cholesterol: 56 mg/dL      Total Cholesterol: 207 mg/dL.

## 2017-01-27 NOTE — MR AVS SNAPSHOT
After Visit Summary   1/27/2017    Cecile Mays    MRN: [id number]           Patient Information     Date Of Birth          1955        Visit Information        Provider Department      1/27/2017 9:30 AM Tommy Pringle MD [facility]        Today's Diagnoses     Routine general medical examination at a health care facility    -  1     Fifth cranial nerve disease or syndrome         Multiple sclerosis (H)         Adjustment disorder with anxious mood           Care Instructions      Preventive Health Recommendations  Female Ages 50 - 64    Yearly exam: See your health care provider every year in order to  o Review health changes.   o Discuss preventive care.    o Review your medicines if your doctor has prescribed any.      Get a Pap test every three years (unless you have an abnormal result and your provider advises testing more often).    If you get Pap tests with HPV test, you only need to test every 5 years, unless you have an abnormal result.     You do not need a Pap test if your uterus was removed (hysterectomy) and you have not had cancer.    You should be tested each year for STDs (sexually transmitted diseases) if you're at risk.     Have a mammogram every 1 to 2 years.    Have a colonoscopy at age 50, or have a yearly FIT test (stool test). These exams screen for colon cancer.      Have a cholesterol test every 5 years, or more often if advised.    Have a diabetes test (fasting glucose) every three years. If you are at risk for diabetes, you should have this test more often.     If you are at risk for osteoporosis (brittle bone disease), think about having a bone density scan (DEXA).    Shots: Get a flu shot each year. Get a tetanus shot every 10 years.    Nutrition:     Eat at least 5 servings of fruits and vegetables each day.    Eat whole-grain bread, whole-wheat pasta and brown rice instead of white grains and rice.    Talk to your provider about  Calcium and Vitamin D.     Lifestyle    Exercise at least 150 minutes a week (30 minutes a day, 5 days a week). This will help you control your weight and prevent disease.    Limit alcohol to one drink per day.    No smoking.     Wear sunscreen to prevent skin cancer.     See your dentist every six months for an exam and cleaning.    See your eye doctor every 1 to 2 years.    Start with 1/2 tablet of buspirone once daily for a few days, then go to twice daily for two weeks.  Then increase to 1 tab and 1/2 tab daily in divided dose for a couple weeks, then whole tab twice daily.  By this time, you should begin to know for sure it works.  You could stop increase sooner if better or call to discuss if side effects.     The 10-year ASCVD risk score (Gian HAZEL Jr., et al., 2013) is: 3.8%    Values used to calculate the score:      Age: 61 years      Sex: Female      Is an : No      Diabetic: No      Tobacco smoker: No      Systolic Blood Pressure: 136 mmHg      Prescribed Antihypertensives: No      HDL Cholesterol: 56 mg/dL      Total Cholesterol: 207 mg/dL.        Follow-ups after your visit        Your next 10 appointments already scheduled     Feb 01, 2017  8:45 AM   ANGY Chiropractor with Katiana Tai DC   [facility] ([facility])    [address] [place] [id number]   [phone]            Feb 20, 2017  8:45 AM   MR BRAIN W/O & W CONTRAST with PHMR1   [facility] ([facility])    [address] [place] [id number]   [phone]           Take your medicines as usual, unless your doctor tells you not to. Bring a list of your current medicines to your exam (including vitamins, minerals and over-the-counter drugs).  You will be given intravenous contrast for this exam. To prepare:   The day before your exam, drink extra fluids at least six 8-ounce glasses (unless your doctor tells you to restrict your fluids).   Have a  blood test (creatinine test) within 30 days of your exam. Go to your clinic or Diagnostic Imaging Department for this test.  The MRI machine uses a strong magnet. Please wear clothes without metal (snaps, zippers). A sweatsuit works well, or we may give you a hospital gown.  Please remove any body piercings and hair extensions before you arrive. You will also remove watches, jewelry, hairpins, wallets, dentures, partial dental plates and hearing aids. You may wear contact lenses, and you may be able to wear your rings. We have a safe place to keep your personal items, but it is safer to leave them at home.   **IMPORTANT** THE INSTRUCTIONS BELOW ARE ONLY FOR THOSE PATIENTS WHO HAVE BEEN TOLD THEY WILL RECEIVE SEDATION OR GENERAL ANESTHESIA DURING THEIR MRI PROCEDURE:  IF YOU WILL RECEIVE SEDATION (take medicine to help you relax during your exam):   You must get the medicine from your doctor before you arrive. Bring the medicine to the exam. Do not take it at home.   Arrive one hour early. Bring someone who can take you home after the test. Your medicine will make you sleepy. After the exam, you may not drive, take a bus or take a taxi by yourself.   No eating 8 hours before your exam. You may have clear liquids up until 4 hours before your exam. (Clear liquids include water, clear tea, black coffee and fruit juice without pulp.)  IF YOU WILL RECEIVE ANESTHESIA (be asleep for your exam):   Arrive 1 1/2 hours early. Bring someone who can take you home after the test. You may not drive, take a bus or take a taxi by yourself.   No eating 8 hours before your exam. You may have clear liquids up until 4 hours before your exam. (Clear liquids include water, clear tea, black coffee and fruit juice without pulp.)  Please call the Imaging Department at your exam site with any questions.            Feb 20, 2017  9:30 AM   MR CERVICAL SPINE W/O & W CONTRAST with PHMR1   [facility] ([facility])    [address]  [facility] [address] [facility] [address] [id number] [phone]           Take your medicines as usual, unless your doctor tells you not to. Bring a list of your current medicines to your exam (including vitamins, minerals and over-the-counter drugs).  You will be given intravenous contrast for this exam. To prepare:   The day before your exam, drink extra fluids at least six 8-ounce glasses (unless your doctor tells you to restrict your fluids).   Have a blood test (creatinine test) within 30 days of your exam. Go to your clinic or Diagnostic Imaging Department for this test.  The MRI machine uses a strong magnet. Please wear clothes without metal (snaps, zippers). A sweatsuit works well, or we may give you a hospital gown.  Please remove any body piercings and hair extensions before you arrive. You will also remove watches, jewelry, hairpins, wallets, dentures, partial dental plates and hearing aids. You may wear contact lenses, and you may be able to wear your rings. We have a safe place to keep your personal items, but it is safer to leave them at home.   **IMPORTANT** THE INSTRUCTIONS BELOW ARE ONLY FOR THOSE PATIENTS WHO HAVE BEEN TOLD THEY WILL RECEIVE SEDATION OR GENERAL ANESTHESIA DURING THEIR MRI PROCEDURE:  IF YOU WILL RECEIVE SEDATION (take medicine to help you relax during your exam):   You must get the medicine from your doctor before you arrive. Bring the medicine to the exam. Do not take it at home.   Arrive one hour early. Bring someone who can take you home after the test. Your medicine will make you sleepy. After the exam, you may not drive, take a bus or take a taxi by yourself.   No eating 8 hours before your exam. You may have clear liquids up until 4 hours before your exam. (Clear liquids include water, clear tea, black coffee and fruit juice without pulp.)  IF YOU WILL RECEIVE ANESTHESIA (be asleep for your exam):   Arrive 1 1/2 hours early. Bring someone who can take you home after the test. You may  "not drive, take a bus or take a taxi by yourself.   No eating 8 hours before your exam. You may have clear liquids up until 4 hours before your exam. (Clear liquids include water, clear tea, black coffee and fruit juice without pulp.)  Please call the Imaging Department at your exam site with any questions.              Who to contact     If you have questions or need follow up information about today's clinic visit or your schedule please contact [facility] directly at [phone].  Normal or non-critical lab and imaging results will be communicated to you by [employer], letter or phone within 4 business days after the clinic has received the results. If you do not hear from us within 7 days, please contact the clinic through [employer] or phone. If you have a critical or abnormal lab result, we will notify you by phone as soon as possible.  Submit refill requests through [employer] or call your pharmacy and they will forward the refill request to us. Please allow 3 business days for your refill to be completed.          Additional Information About Your Visit        Ever[employer] Information     [employer] gives you secure access to your electronic health record. If you see a primary care provider, you can also send messages to your care team and make appointments. If you have questions, please call your primary care clinic.  If you do not have a primary care provider, please call [phone] and they will assist you.        Care EveryWhere ID     This is your Care EveryWhere ID. This could be used by other organizations to access your [place] medical records  [id number]        Your Vitals Were     Pulse Temperature Respirations Height BMI (Body Mass Index) Pulse Oximetry    72 97  F (36.1  C) (Tympanic) 16 5' 6.24\" (1.682 m) 27.10 kg/m2 97%    Last Period                   02/05/2008            Blood Pressure from Last 3 Encounters:   01/27/17 136/70   12/15/16 136/74   08/24/16 116/66    Weight from Last 3 " Encounters:   01/27/17 169 lb (76.658 kg)   12/15/16 174 lb 9.6 oz (79.198 kg)   08/24/16 169 lb (76.658 kg)              Today, you had the following     No orders found for display         Today's Medication Changes          These changes are accurate as of: 1/27/17 10:21 AM.  If you have any questions, ask your nurse or doctor.               Start taking these medicines.        Dose/Directions    busPIRone 15 MG tablet   Commonly known as:  BUSPAR   Used for:  Adjustment disorder with anxious mood   Started by:  Tommy Pringle MD        Dose:  15 mg   Take 1 tablet (15 mg) by mouth 2 times daily   Quantity:  60 tablet   Refills:  1       LORazepam 1 MG tablet   Commonly known as:  ATIVAN   Used for:  Fifth cranial nerve disease or syndrome   Started by:  Tommy Pringle MD        Dose:  1 mg   Take 1 tablet (1 mg) by mouth every 8 hours as needed for anxiety   Quantity:  20 tablet   Refills:  5            Where to get your medicines      These medications were sent to [id number] [place] - [address]  [address], [place] [address]     Phone:  [phone]    - busPIRone 15 MG tablet  - mirtazapine 15 MG tablet      Some of these will need a paper prescription and others can be bought over the counter.  Ask your nurse if you have questions.     Bring a paper prescription for each of these medications    - LORazepam 1 MG tablet             Primary Care Provider Office Phone # Fax #    Tommy Pringle [phone]       [facility] [address] [place] [address]        Thank you!     Thank you for choosing [facility]  for your care. Our goal is always to provide you with excellent care. Hearing back from our patients is one way we can continue to improve our services. Please take a few minutes to complete the written survey that you may receive in the mail after your visit with us. Thank you!             Your Updated Medication  List - Protect others around you: Learn how to safely use, store and throw away your medicines at www.disposemymeds.org.          This list is accurate as of: 1/27/17 10:21 AM.  Always use your most recent med list.                   Brand Name Dispense Instructions for use    albuterol 108 (90 BASE) MCG/ACT Inhaler    PROAIR HFA/PROVENTIL HFA/VENTOLIN HFA    1 Inhaler    Inhale 2 puffs into the lungs every 6 hours as needed for shortness of breath / dyspnea or wheezing       aspirin 81 MG tablet     90 tablet    Take 1 tablet (81 mg) by mouth daily       busPIRone 15 MG tablet    BUSPAR    60 tablet    Take 1 tablet (15 mg) by mouth 2 times daily       calcium carbonate 500 MG tablet    OS-RAIN 500 mg [place]. Ca     Take 1 tablet (500 mg) by mouth daily       COPAXONE 20 MG/ML injection   Generic drug:  glatiramer      Inject 1 mL (20 mg) Subcutaneous daily       cycloSPORINE 0.05 % ophthalmic emulsion    RESTASIS     Place 1 drop into both eyes 2 times daily       LORazepam 1 MG tablet    ATIVAN    20 tablet    Take 1 tablet (1 mg) by mouth every 8 hours as needed for anxiety       mirtazapine 15 MG tablet    REMERON    30 tablet    Take 1 tablet (15 mg) by mouth At Bedtime       omega 3 1000 MG Caps     90 capsule    Take 1 g by mouth daily       vitamin D 2000 UNITS tablet     100 tablet    3,000 units daily       WOMENS DAILY MULTIVITAMIN Tabs     30    1 TABLET DAILY

## 2017-01-27 NOTE — NURSING NOTE
"Chief Complaint   Patient presents with     Physical     no pap     Anxiety     Rehceck Anxiety     Multiple Sclerosis     Recheck       Initial /70 mmHg  Pulse 72  Temp(Src) 97  F (36.1  C) (Tympanic)  Resp 16  Ht 5' 6.24\" (1.682 m)  Wt 169 lb (76.658 kg)  BMI 27.10 kg/m2  SpO2 97%  LMP 02/05/2008 Estimated body mass index is 27.1 kg/(m^2) as calculated from the following:    Height as of this encounter: 5' 6.24\" (1.682 m).    Weight as of this encounter: 169 lb (76.658 kg)..  BP completed using cuff size: regular  Pt. declines need for nurse present during exam.  Patient declines any breast problems  Patient declines having any acute bladder symptoms.    /Piper Phillips/HEBERT(AAMA)     "

## 2017-01-28 ASSESSMENT — ANXIETY QUESTIONNAIRES: GAD7 TOTAL SCORE: 13

## 2017-02-01 ENCOUNTER — THERAPY VISIT (OUTPATIENT)
Dept: CHIROPRACTIC MEDICINE | Facility: CLINIC | Age: 62
End: 2017-02-01
Payer: COMMERCIAL

## 2017-02-01 ENCOUNTER — TELEPHONE (OUTPATIENT)
Dept: FAMILY MEDICINE | Facility: CLINIC | Age: 62
End: 2017-02-01

## 2017-02-01 DIAGNOSIS — M99.03 SEGMENTAL DYSFUNCTION OF LUMBAR REGION: Primary | ICD-10-CM

## 2017-02-01 DIAGNOSIS — M99.02 SEGMENTAL DYSFUNCTION OF THORACIC REGION: ICD-10-CM

## 2017-02-01 DIAGNOSIS — M99.04 SEGMENTAL DYSFUNCTION OF SACRAL REGION: ICD-10-CM

## 2017-02-01 DIAGNOSIS — M54.50 LUMBAGO: ICD-10-CM

## 2017-02-01 PROCEDURE — 98941 CHIROPRACT MANJ 3-4 REGIONS: CPT | Mod: AT | Performed by: CHIROPRACTOR

## 2017-02-01 NOTE — PROGRESS NOTES
"Visit #:  7 of 8 based on treatment plan 11/21/2016 - [facility]    Subjective:  Cecile Mays is a 60 year old female who is seen in f/u up for:        Segmental dysfunction of lumbar region  Segmental dysfunction of thoracic region  Lumbago  Segmental dysfunction of sacral region.     Since last visit on 1/25/2017,  Cecile Mays reports the following changes: Patient presents and states that she is frustrated because her pain is not improving. She just feels tight on the right side. Her activities have not changed, but she is trying to move and stretch and do yoga. She still \"has the rib\" on the right side, and her right lower back and \"marble\" are bothering her. The pain is like a toothache and rated 2-3/10. She would like us to increase the pressure on the [facility] on the rib.     Objective:  The following was observed:    P: right lower ribs, right SI joint    A: static palpation demonstrates intersegmental asymmetry, as noted    R: motion palpation notes restricted motion    T: localized muscle spasm at: Intercostal, Lumbar erector spine, Piriformis and Traps R>>L Right QL was tender and hypertonic today.      Assessment:    Segmental spinal dysfunction/restrictions found at:  T6 RR, LRR  T10 RR, LRR with right 10th rib  L4 RR, LRR  Right SI posterior    Diagnoses:      1. Segmental dysfunction of lumbar region    2. Segmental dysfunction of thoracic region    3. Lumbago    4. Segmental dysfunction of sacral region        Patient's condition:  Patient had restrictions pre-manipulation and Patient had decreased motion prior to manipulation    Treatment effectiveness:  Post manipulation there is better intersegmental movement and Patient claims to feel looser post manipulation      Procedures:  CMT:  07529 Chiropractic manipulative treatment 3-4 regions performed   Thoracic: Diversified,  Mobilization, T6,  T10 Prone  Lumbar: Drop Table, L4 Prone  Pelvis: Drop Table, PSIS Right , " "Prone    Therapeutic procedures:  Continue with Bruegger's Relief.   ConnecTx to the right rib area for 8 minutes   Rocktape to the right rib area    MRI report form 6/6/2016:   There is minimal spinal canal narrowing at the T9-T10 level due to a  right paracentral disc herniation. There is no other spinal canal or  neural foraminal narrowing of the thoracic spine.           Note: disc herniation at T9-10 on right - could be contributory for pain.    Prognosis: Good    Progress towards Goals: Patient has met the goal of decreased pain and hypertonicity throughout spine.     Response to Treatment:   Patient has made great improvement with care. She states that her back and ribs feel \"better than they have in years.\"      Recommendations:    Instructions:ice 20 minutes every other hour as needed and stretch as instructed at visit    Follow-up:  Return to care in 1 week.      "

## 2017-02-01 NOTE — TELEPHONE ENCOUNTER
"Reason for call:  Patient reporting a symptom    Symptom or request: Patient had her pneumonia shot on Friday. Now it is very red, warm to the touch, and about 3\" in diameter and is itchy. Wondering if she needs to be seen for this or what should she do    Duration (how long have symptoms been present): It's been sore ever since she had it done, but now today it started changing.    Have you been treated for this before? No    Additional comments:     Phone Number patient can be reached at:  [phone]    Best Time:  any    Can we leave a detailed message on this number:  YES    Call taken on 2/1/2017 at 5:09 PM by Jess Macias    "

## 2017-02-01 NOTE — TELEPHONE ENCOUNTER
Cecile Mays is a 61 year old female who calls with redness around her Prevnar 13 injection site that was received 5 days ago.    NURSING ASSESSMENT:  Description:  Redness, warmth itch. Denies rash or hives, SOB.   Onset/duration:  Just noticed today.  Precip. factors:  none  Associated symptoms:  none  Improves/worsens symptoms:  none  Pain scale (0-10)   0/10  LMP/preg/breast feeding:  n/a  Last exam/Treatment:  n/a  Allergies:   Allergies   Allergen Reactions     No Known Drug Allergy          NURSING PLAN: Nursing advice to patient take some benadryl and ice the area. Draw a Pokagon around the red area to see if it is growing.     RECOMMENDED DISPOSITION:  Home care advice -   Will comply with recommendation: Yes  If further questions/concerns or if symptoms do not improve, worsen or new symptoms develop, call your PCP or [facility] as soon as possible.      Guideline used:  Package insert for Prevnar 13, MD website    Kelli Lao RN

## 2017-02-07 ENCOUNTER — THERAPY VISIT (OUTPATIENT)
Dept: CHIROPRACTIC MEDICINE | Facility: CLINIC | Age: 62
End: 2017-02-07
Payer: COMMERCIAL

## 2017-02-07 ENCOUNTER — RADIANT APPOINTMENT (OUTPATIENT)
Dept: GENERAL RADIOLOGY | Facility: CLINIC | Age: 62
End: 2017-02-07
Attending: CHIROPRACTOR
Payer: COMMERCIAL

## 2017-02-07 DIAGNOSIS — M99.04 SEGMENTAL DYSFUNCTION OF SACRAL REGION: ICD-10-CM

## 2017-02-07 DIAGNOSIS — M99.02 SEGMENTAL DYSFUNCTION OF THORACIC REGION: ICD-10-CM

## 2017-02-07 DIAGNOSIS — M99.03 SEGMENTAL DYSFUNCTION OF LUMBAR REGION: Primary | ICD-10-CM

## 2017-02-07 DIAGNOSIS — M54.50 LUMBAGO: ICD-10-CM

## 2017-02-07 PROCEDURE — 98941 CHIROPRACT MANJ 3-4 REGIONS: CPT | Mod: AT | Performed by: CHIROPRACTOR

## 2017-02-07 PROCEDURE — 72110 X-RAY EXAM L-2 SPINE 4/>VWS: CPT | Mod: TC

## 2017-02-07 NOTE — PROGRESS NOTES
"Visit #:  8 of 8 based on treatment plan 11/21/2016 - [facility]    Subjective:  Cecile Mays is a 60 year old female who is seen in f/u up for:        Segmental dysfunction of lumbar region  Segmental dysfunction of thoracic region  Lumbago  Segmental dysfunction of sacral region.     Since last visit on 2/1/2017,  Cecile Mays reports the following changes: Patient presents and states that she is \"the same.\" It feels like there is a \"partial roll of TP under that rib.\" The spot on her spine disallows her from flexing forward. Patient is frustrated with her pain. We discussed acupuncture, and decided to get thoracic spine x-rays today to better visualize the lower ribs.    Objective:  The following was observed:    P: right lower ribs, right SI joint    A: static palpation demonstrates intersegmental asymmetry, as noted    R: motion palpation notes restricted motion    T: localized muscle spasm at: Intercostal, Lumbar erector spine, Piriformis and Traps R>>L Right QL was tender and hypertonic today.      Assessment:    Segmental spinal dysfunction/restrictions found at:  T6 RR, LRR  T10 RR, LRR with right 10th rib  L4 RR, LRR  Right SI posterior    Diagnoses:      1. Segmental dysfunction of lumbar region    2. Segmental dysfunction of thoracic region    3. Lumbago    4. Segmental dysfunction of sacral region        Patient's condition:  Patient had restrictions pre-manipulation and Patient had decreased motion prior to manipulation    Treatment effectiveness:  Post manipulation there is better intersegmental movement and Patient claims to feel looser post manipulation      Procedures:  CMT:  26493 Chiropractic manipulative treatment 3-4 regions performed   Thoracic: Diversified,  Mobilization, T6,  T10 Prone  Lumbar: Diversified, L4 Prone  Pelvis: Diversified, PSIS Right , Prone    Therapeutic procedures:  None      MRI report form 6/6/2016:   There is minimal spinal canal narrowing at " "the T9-T10 level due to a  right paracentral disc herniation. There is no other spinal canal or  neural foraminal narrowing of the thoracic spine.           Note: disc herniation at T9-10 on right - could be contributory for pain.    Prognosis: Good    Progress towards Goals: Patient has met the goal of decreased pain and hypertonicity throughout spine.     Response to Treatment:   Patient has made great improvement with care. She states that her back and ribs feel \"better than they have in years.\" Patient is frustrated with plateau.      Recommendations:    Instructions:ice 20 minutes every other hour as needed and stretch as instructed at visit    Follow-up:  Return to care Friday. Thoracic spine x-rays obtained today.      "

## 2017-02-10 ENCOUNTER — THERAPY VISIT (OUTPATIENT)
Dept: CHIROPRACTIC MEDICINE | Facility: CLINIC | Age: 62
End: 2017-02-10
Payer: COMMERCIAL

## 2017-02-10 DIAGNOSIS — M99.02 SEGMENTAL DYSFUNCTION OF THORACIC REGION: ICD-10-CM

## 2017-02-10 DIAGNOSIS — M99.03 SEGMENTAL DYSFUNCTION OF LUMBAR REGION: Primary | ICD-10-CM

## 2017-02-10 DIAGNOSIS — M99.04 SEGMENTAL DYSFUNCTION OF SACRAL REGION: ICD-10-CM

## 2017-02-10 DIAGNOSIS — M54.50 LUMBAGO: ICD-10-CM

## 2017-02-10 PROCEDURE — 97035 APP MDLTY 1+ULTRASOUND EA 15: CPT | Performed by: CHIROPRACTOR

## 2017-02-10 PROCEDURE — 98941 CHIROPRACT MANJ 3-4 REGIONS: CPT | Mod: AT | Performed by: CHIROPRACTOR

## 2017-02-10 NOTE — PROGRESS NOTES
Visit #:  9 of 8 based on treatment plan 11/21/2016 - [facility]    Subjective:  Cecile Mays is a 60 year old female who is seen in f/u up for:        Segmental dysfunction of lumbar region  Segmental dysfunction of thoracic region  Lumbago  Segmental dysfunction of sacral region.     Since last visit on 2/7/2017,  Cecile Mays reports the following changes: Patient presents and states that she is the same today. She is frustrated with her x-ray results, which we viewed and discussed. I relayed to her that I think her pain is multifactorial to her MS, her T9-10 disc herniation, and her degenerative changes at L4-5 (spurring formation). We discussed the medical approach, which would be to see Dr. Amador and consider ESTELA, but she is opting to pursue more of a holistic approach and look for nutrition, essential oils and acupuncture. I offered her other [place] options for continued care, she is choosing to pursue a DC in [place] that would bring this approach.     Objective:  The following was observed:    P: right lower ribs, right SI joint    A: static palpation demonstrates intersegmental asymmetry, as noted    R: motion palpation notes restricted motion    T: localized muscle spasm at: Intercostal, Lumbar erector spine, Piriformis and Traps R>>L Right QL was tender and hypertonic today.      Assessment:    Segmental spinal dysfunction/restrictions found at:  T6 RR, LRR  T10 RR, LRR with right 10th rib  L4 RR, LRR  Right SI posterior    Diagnoses:      1. Segmental dysfunction of lumbar region    2. Segmental dysfunction of thoracic region    3. Lumbago    4. Segmental dysfunction of sacral region        Patient's condition:  Patient had restrictions pre-manipulation and Patient had decreased motion prior to manipulation    Treatment effectiveness:  Post manipulation there is better intersegmental movement and Patient claims to feel looser post manipulation      Procedures:  CMT:  83039  "Chiropractic manipulative treatment 3-4 regions performed   Thoracic: Diversified,  Mobilization, T6,  T10 Prone  Lumbar: Diversified, L4 Prone  Pelvis: Diversified, PSIS Right , Prone    Modalities: US to right lower ribs, 8 min, 1.0 maxwell, continuous    Therapeutic procedures:  None      MRI report form 6/6/2016:   There is minimal spinal canal narrowing at the T9-T10 level due to a  right paracentral disc herniation. There is no other spinal canal or  neural foraminal narrowing of the thoracic spine.           Note: disc herniation at T9-10 on right - could be contributory for pain.  Lumbar/rib x-rays from 2/7/2017 show mild degenerative changes.    Prognosis: Good    Progress towards Goals: Patient has met the goal of decreased pain and hypertonicity throughout spine. She is frustrated with continued rib pain and effects of MS.     Response to Treatment:   Patient has made great improvement with care. She states that her back and ribs feel \"better than they have in years.\" Patient is frustrated with plateau and wishes to pursue more of a holistic approach elsewhere.      Recommendations:    Instructions:ice 20 minutes every other hour as needed and stretch as instructed at visit    Follow-up:  Return to care as needed. Patient is going to consider more of a holistic approach elsewhere.      "

## 2017-02-20 ENCOUNTER — HOSPITAL ENCOUNTER (OUTPATIENT)
Dept: MRI IMAGING | Facility: CLINIC | Age: 62
End: 2017-02-20
Attending: PHYSICIAN ASSISTANT
Payer: COMMERCIAL

## 2017-02-20 ENCOUNTER — HOSPITAL ENCOUNTER (OUTPATIENT)
Dept: MRI IMAGING | Facility: CLINIC | Age: 62
Discharge: HOME | End: 2017-02-20
Attending: PHYSICIAN ASSISTANT | Admitting: PHYSICIAN ASSISTANT
Payer: COMMERCIAL

## 2017-02-20 DIAGNOSIS — R93.89 MEDIASTINAL SHIFT: ICD-10-CM

## 2017-02-20 DIAGNOSIS — G35 MULTIPLE SCLEROSIS (H): ICD-10-CM

## 2017-02-20 PROCEDURE — 72156 MRI NECK SPINE W/O & W/DYE: CPT

## 2017-02-20 PROCEDURE — A9585 GADOBUTROL INJECTION: HCPCS | Performed by: RADIOLOGY

## 2017-02-20 PROCEDURE — 70553 MRI BRAIN STEM W/O & W/DYE: CPT

## 2017-02-20 PROCEDURE — 25500064 ZZH RX 255 OP 636: Performed by: RADIOLOGY

## 2017-02-20 RX ORDER — GADOBUTROL 604.72 MG/ML
7.5 INJECTION INTRAVENOUS ONCE
Status: COMPLETED | OUTPATIENT
Start: 2017-02-20 | End: 2017-02-20

## 2017-02-20 RX ADMIN — GADOBUTROL 7.5 ML: 604.72 INJECTION INTRAVENOUS at 09:50

## 2017-02-27 ENCOUNTER — TRANSFERRED RECORDS (OUTPATIENT)
Dept: HEALTH INFORMATION MANAGEMENT | Facility: CLINIC | Age: 62
End: 2017-02-27

## 2017-03-21 DIAGNOSIS — F43.22 ADJUSTMENT DISORDER WITH ANXIOUS MOOD: ICD-10-CM

## 2017-03-21 NOTE — TELEPHONE ENCOUNTER
Buspirone       Last Written Prescription Date: 1/27/17  Last Fill Quantity: 60; # refills: 1  Last Office Visit with [facility], [facility] or [facility] prescribing provider:  1/27/17        Last PHQ-9 score on record=   PHQ-9 SCORE 12/31/2014   Total Score 0       Lab Results   Component Value Date    AST 21 03/22/2011     Lab Results   Component Value Date    ALT 37 02/23/2015     Rosalia Pena MA 3/21/2017

## 2017-03-22 RX ORDER — BUSPIRONE HYDROCHLORIDE 15 MG/1
TABLET ORAL
Qty: 60 TABLET | Refills: 9 | Status: SHIPPED | OUTPATIENT
Start: 2017-03-22 | End: 2017-10-11

## 2017-03-22 NOTE — TELEPHONE ENCOUNTER
Prescription approved per [facility]..........KADY Marx  Dx for use is anxiety.....................AraseliRN

## 2017-04-07 ENCOUNTER — TELEPHONE (OUTPATIENT)
Dept: FAMILY MEDICINE | Facility: CLINIC | Age: 62
End: 2017-04-07

## 2017-04-11 DIAGNOSIS — G35 ACUTE RELAPSING MULTIPLE SCLEROSIS (H): Primary | ICD-10-CM

## 2017-04-17 DIAGNOSIS — G35 ACUTE RELAPSING MULTIPLE SCLEROSIS (H): ICD-10-CM

## 2017-04-17 LAB
ALT SERPL W P-5'-P-CCNC: 42 U/L (ref 0–50)
AST SERPL W P-5'-P-CCNC: 22 U/L (ref 0–45)
BASOPHILS # BLD AUTO: 0 10E9/L (ref 0–0.2)
BASOPHILS NFR BLD AUTO: 0.5 %
DIFFERENTIAL METHOD BLD: NORMAL
EOSINOPHIL # BLD AUTO: 0.1 10E9/L (ref 0–0.7)
EOSINOPHIL NFR BLD AUTO: 2.3 %
ERYTHROCYTE [DISTWIDTH] IN BLOOD BY AUTOMATED COUNT: 12.4 % (ref 10–15)
HCT VFR BLD AUTO: 42.1 % (ref 35–47)
HGB BLD-MCNC: 14.3 G/DL (ref 11.7–15.7)
IMM GRANULOCYTES # BLD: 0 10E9/L (ref 0–0.4)
IMM GRANULOCYTES NFR BLD: 0.2 %
LYMPHOCYTES # BLD AUTO: 2.6 10E9/L (ref 0.8–5.3)
LYMPHOCYTES NFR BLD AUTO: 45.6 %
MCH RBC QN AUTO: 30.4 PG (ref 26.5–33)
MCHC RBC AUTO-ENTMCNC: 34 G/DL (ref 31.5–36.5)
MCV RBC AUTO: 89 FL (ref 78–100)
MONOCYTES # BLD AUTO: 0.3 10E9/L (ref 0–1.3)
MONOCYTES NFR BLD AUTO: 5.7 %
NEUTROPHILS # BLD AUTO: 2.6 10E9/L (ref 1.6–8.3)
NEUTROPHILS NFR BLD AUTO: 45.7 %
PLATELET # BLD AUTO: 244 10E9/L (ref 150–450)
RBC # BLD AUTO: 4.71 10E12/L (ref 3.8–5.2)
WBC # BLD AUTO: 5.6 10E9/L (ref 4–11)

## 2017-04-17 PROCEDURE — 85025 COMPLETE CBC W/AUTO DIFF WBC: CPT | Performed by: PHYSICIAN ASSISTANT

## 2017-04-17 PROCEDURE — 36415 COLL VENOUS BLD VENIPUNCTURE: CPT | Performed by: PHYSICIAN ASSISTANT

## 2017-04-17 PROCEDURE — 84460 ALANINE AMINO (ALT) (SGPT): CPT | Performed by: PHYSICIAN ASSISTANT

## 2017-04-17 PROCEDURE — 84450 TRANSFERASE (AST) (SGOT): CPT | Performed by: PHYSICIAN ASSISTANT

## 2017-05-15 ENCOUNTER — HOSPITAL ENCOUNTER (OUTPATIENT)
Dept: MRI IMAGING | Facility: CLINIC | Age: 62
End: 2017-05-15
Attending: PHYSICIAN ASSISTANT
Payer: COMMERCIAL

## 2017-05-15 ENCOUNTER — HOSPITAL ENCOUNTER (OUTPATIENT)
Dept: MRI IMAGING | Facility: CLINIC | Age: 62
Discharge: HOME | End: 2017-05-15
Attending: PHYSICIAN ASSISTANT | Admitting: PHYSICIAN ASSISTANT
Payer: COMMERCIAL

## 2017-05-15 DIAGNOSIS — G35 MULTIPLE SCLEROSIS (H): ICD-10-CM

## 2017-05-15 DIAGNOSIS — R93.89 ABNORMAL MRI: ICD-10-CM

## 2017-05-15 PROCEDURE — 70553 MRI BRAIN STEM W/O & W/DYE: CPT

## 2017-05-15 PROCEDURE — 72156 MRI NECK SPINE W/O & W/DYE: CPT

## 2017-05-15 PROCEDURE — 25500064 ZZH RX 255 OP 636: Performed by: RADIOLOGY

## 2017-05-15 PROCEDURE — A9585 GADOBUTROL INJECTION: HCPCS | Performed by: RADIOLOGY

## 2017-05-15 RX ORDER — GADOBUTROL 604.72 MG/ML
7.5 INJECTION INTRAVENOUS ONCE
Status: COMPLETED | OUTPATIENT
Start: 2017-05-15 | End: 2017-05-15

## 2017-05-15 RX ADMIN — GADOBUTROL 7.5 ML: 604.72 INJECTION INTRAVENOUS at 08:54

## 2017-05-31 ENCOUNTER — TRANSFERRED RECORDS (OUTPATIENT)
Dept: HEALTH INFORMATION MANAGEMENT | Facility: CLINIC | Age: 62
End: 2017-05-31

## 2017-05-31 DIAGNOSIS — G35 RELAPSING REMITTING MULTIPLE SCLEROSIS (H): ICD-10-CM

## 2017-05-31 DIAGNOSIS — R93.89 ABNORMAL MRI: Primary | ICD-10-CM

## 2017-06-19 DIAGNOSIS — G35 MULTIPLE SCLEROSIS (H): ICD-10-CM

## 2017-06-20 NOTE — TELEPHONE ENCOUNTER
mirtazapine (REMERON) 15 MG tablet       Last Written Prescription Date: 1/27/17  Last Fill Quantity: 30; # refills: 1  Last Office Visit with [facility], [facility] or [facility] prescribing provider:  1/27/17        Last PHQ-9 score on record=   PHQ-9 SCORE 12/31/2014   Total Score 0       Lab Results   Component Value Date    AST 22 04/17/2017     Lab Results   Component Value Date    ALT 42 04/17/2017

## 2017-06-21 RX ORDER — MIRTAZAPINE 15 MG/1
TABLET, FILM COATED ORAL
Qty: 30 TABLET | Refills: 3 | Status: SHIPPED | OUTPATIENT
Start: 2017-06-21 | End: 2017-11-20

## 2017-06-21 NOTE — TELEPHONE ENCOUNTER
Routing REMERON refill request to provider for review/approval because:  A break in medication. Last filled 1/27/17 for # 30-  RF X1   SIG: Take one at HS.   DX for use is MS ( should it be Insomnia ?  Looks like taking it just when needs it )  Please review and add refills as see fit. Thank you.  KADY aMrx

## 2017-07-21 DIAGNOSIS — F43.22 ADJUSTMENT DISORDER WITH ANXIOUS MOOD: ICD-10-CM

## 2017-07-24 NOTE — TELEPHONE ENCOUNTER
traZODone (DESYREL) 50 MG tablet (Discontinued)      Last Written Prescription Date:  8/24/16  Last Fill Quantity: 30,   # refills: 1  Last Office Visit with [facility], [facility] or [facility] prescribing provider: 1/27/17  Future Office visit:       Routing refill request to provider for review/approval because:  Drug not active on patient's medication list

## 2017-07-25 RX ORDER — TRAZODONE HYDROCHLORIDE 50 MG/1
TABLET, FILM COATED ORAL
Qty: 15 TABLET | Refills: 1 | Status: SHIPPED | OUTPATIENT
Start: 2017-07-25 | End: 2018-01-08

## 2017-07-27 ENCOUNTER — TRANSFERRED RECORDS (OUTPATIENT)
Dept: HEALTH INFORMATION MANAGEMENT | Facility: CLINIC | Age: 62
End: 2017-07-27

## 2017-07-27 DIAGNOSIS — G35 RELAPSING REMITTING MULTIPLE SCLEROSIS (H): ICD-10-CM

## 2017-07-27 DIAGNOSIS — R93.89 ABNORMAL MRI: ICD-10-CM

## 2017-07-27 LAB
ALT SERPL W P-5'-P-CCNC: 71 U/L (ref 0–50)
AST SERPL W P-5'-P-CCNC: 30 U/L (ref 0–45)
BASOPHILS # BLD AUTO: 0 10E9/L (ref 0–0.2)
BASOPHILS NFR BLD AUTO: 0.5 %
DIFFERENTIAL METHOD BLD: NORMAL
EOSINOPHIL # BLD AUTO: 0.1 10E9/L (ref 0–0.7)
EOSINOPHIL NFR BLD AUTO: 1.7 %
ERYTHROCYTE [DISTWIDTH] IN BLOOD BY AUTOMATED COUNT: 12.1 % (ref 10–15)
HCT VFR BLD AUTO: 39.5 % (ref 35–47)
HGB BLD-MCNC: 13.1 G/DL (ref 11.7–15.7)
IMM GRANULOCYTES # BLD: 0 10E9/L (ref 0–0.4)
IMM GRANULOCYTES NFR BLD: 0.2 %
LYMPHOCYTES # BLD AUTO: 2.7 10E9/L (ref 0.8–5.3)
LYMPHOCYTES NFR BLD AUTO: 44.1 %
MCH RBC QN AUTO: 30 PG (ref 26.5–33)
MCHC RBC AUTO-ENTMCNC: 33.2 G/DL (ref 31.5–36.5)
MCV RBC AUTO: 91 FL (ref 78–100)
MONOCYTES # BLD AUTO: 0.5 10E9/L (ref 0–1.3)
MONOCYTES NFR BLD AUTO: 8.3 %
NEUTROPHILS # BLD AUTO: 2.7 10E9/L (ref 1.6–8.3)
NEUTROPHILS NFR BLD AUTO: 45.2 %
PLATELET # BLD AUTO: 241 10E9/L (ref 150–450)
RBC # BLD AUTO: 4.36 10E12/L (ref 3.8–5.2)
WBC # BLD AUTO: 6 10E9/L (ref 4–11)

## 2017-07-27 PROCEDURE — 84460 ALANINE AMINO (ALT) (SGPT): CPT | Performed by: PSYCHIATRY & NEUROLOGY

## 2017-07-27 PROCEDURE — 82306 VITAMIN D 25 HYDROXY: CPT | Performed by: PSYCHIATRY & NEUROLOGY

## 2017-07-27 PROCEDURE — 36415 COLL VENOUS BLD VENIPUNCTURE: CPT | Performed by: PSYCHIATRY & NEUROLOGY

## 2017-07-27 PROCEDURE — 84450 TRANSFERASE (AST) (SGOT): CPT | Performed by: PSYCHIATRY & NEUROLOGY

## 2017-07-27 PROCEDURE — 82607 VITAMIN B-12: CPT | Performed by: PSYCHIATRY & NEUROLOGY

## 2017-07-27 PROCEDURE — 85025 COMPLETE CBC W/AUTO DIFF WBC: CPT | Performed by: PSYCHIATRY & NEUROLOGY

## 2017-07-28 LAB — VIT B12 SERPL-MCNC: 922 PG/ML (ref 193–986)

## 2017-07-31 ENCOUNTER — MYC MEDICAL ADVICE (OUTPATIENT)
Dept: FAMILY MEDICINE | Facility: CLINIC | Age: 62
End: 2017-07-31

## 2017-07-31 LAB
DEPRECATED CALCIDIOL+CALCIFEROL SERPL-MC: NORMAL UG/L (ref 20–75)
VITAMIN D2 SERPL-MCNC: <5 UG/L
VITAMIN D3 SERPL-MCNC: 58 UG/L

## 2017-07-31 NOTE — TELEPHONE ENCOUNTER
Orders were from Dr. Borges provider to please advise//Piper Phillips/HEBRET([facility])

## 2017-08-23 DIAGNOSIS — F43.22 ADJUSTMENT DISORDER WITH ANXIOUS MOOD: ICD-10-CM

## 2017-08-23 NOTE — TELEPHONE ENCOUNTER
Trazodone       Last Written Prescription Date: 7/25/2017  Last Fill Quantity: 15; # refills: 1  Last Office Visit with [facility], [facility] or  [facility] prescribing provider:  1/27/2017        Last PHQ-9 score on record=   PHQ-9 SCORE 12/31/2014   Total Score 0       Lab Results   Component Value Date    AST 30 07/27/2017     Lab Results   Component Value Date    ALT 71 07/27/2017

## 2017-08-28 RX ORDER — TRAZODONE HYDROCHLORIDE 50 MG/1
TABLET, FILM COATED ORAL
Qty: 15 TABLET | Refills: 4 | Status: SHIPPED | OUTPATIENT
Start: 2017-08-28 | End: 2018-01-08

## 2017-08-28 NOTE — TELEPHONE ENCOUNTER
Trazodone  Prescription [place] per [facility].    Jose Gardiner RN, BSN

## 2017-10-11 DIAGNOSIS — F43.22 ADJUSTMENT DISORDER WITH ANXIOUS MOOD: ICD-10-CM

## 2017-10-11 RX ORDER — BUSPIRONE HYDROCHLORIDE 15 MG/1
TABLET ORAL
Qty: 270 TABLET | Refills: 1 | Status: SHIPPED | OUTPATIENT
Start: 2017-10-11 | End: 2018-02-05

## 2017-10-11 RX ORDER — BUSPIRONE HYDROCHLORIDE 15 MG/1
TABLET ORAL
Qty: 270 TABLET | Refills: 1 | Status: SHIPPED | OUTPATIENT
Start: 2017-10-11 | End: 2017-10-11

## 2017-10-11 NOTE — TELEPHONE ENCOUNTER
This dose has been working for patient without side effects occurring. Ordered as such. Tommy Pringle M.D.

## 2017-10-11 NOTE — TELEPHONE ENCOUNTER
Patient states she takes Buspar 15 mg   2 tabs every am and 1 tab every pm and confirmed dose with pharmacistQuentin  that her last dose per tab is 15 mg .  Provider to please approve and close revised Prescription and patient was notified to check at the pharmacy for Prescription approval//Piper Phillips/HEBERT([facility])

## 2017-10-11 NOTE — TELEPHONE ENCOUNTER
Reason for Call:  Medication or medication refill:    Do you use a [facility]?  Name of the pharmacy and phone number for the current request:  [facility] - [phone]    Name of the medication requested: busPIRone (BUSPAR) 15 MG tablet    Other request: Patient called regarding the medication buspirone she takes. She states she called it into her pharmacy and they told her it was too early to fill it but she said it was discussed with  and they had changed her dose from when she started taking it and now she doesn't have enough to last her to her next refill. She states she take 2 pills in the morning and 1 pill at night. She is wondering if she can get a refill on this and if the prescription can be changed since she is almost out of it. Please advise.     Can we leave a detailed message on this number? YES    Phone number patient can be reached at: Cell number on file:    Telephone Information:   Mobile [phone]       Best Time: any     Call taken on 10/11/2017 at 7:40 AM by David Acuña

## 2017-10-11 NOTE — TELEPHONE ENCOUNTER
I did change dosing as required. Let patient know that prescription has been sent to pharmacy. Tommy MARIAJOSE Pringle M.D.

## 2017-10-25 ENCOUNTER — HOSPITAL ENCOUNTER (OUTPATIENT)
Dept: MRI IMAGING | Facility: CLINIC | Age: 62
Discharge: HOME | End: 2017-10-25
Attending: PSYCHIATRY & NEUROLOGY | Admitting: PSYCHIATRY & NEUROLOGY
Payer: COMMERCIAL

## 2017-10-25 ENCOUNTER — HOSPITAL ENCOUNTER (OUTPATIENT)
Dept: MRI IMAGING | Facility: CLINIC | Age: 62
End: 2017-10-25
Attending: PSYCHIATRY & NEUROLOGY
Payer: COMMERCIAL

## 2017-10-25 DIAGNOSIS — G35 ACUTE RELAPSING MULTIPLE SCLEROSIS (H): ICD-10-CM

## 2017-10-25 DIAGNOSIS — R93.89 ABNORMAL MRI: ICD-10-CM

## 2017-10-25 PROCEDURE — 25000128 H RX IP 250 OP 636: Performed by: RADIOLOGY

## 2017-10-25 PROCEDURE — A9585 GADOBUTROL INJECTION: HCPCS | Performed by: RADIOLOGY

## 2017-10-25 PROCEDURE — 72156 MRI NECK SPINE W/O & W/DYE: CPT

## 2017-10-25 PROCEDURE — 70553 MRI BRAIN STEM W/O & W/DYE: CPT

## 2017-10-25 PROCEDURE — 72157 MRI CHEST SPINE W/O & W/DYE: CPT

## 2017-10-25 RX ORDER — GADOBUTROL 604.72 MG/ML
7.5 INJECTION INTRAVENOUS ONCE
Status: COMPLETED | OUTPATIENT
Start: 2017-10-25 | End: 2017-10-25

## 2017-10-25 RX ADMIN — GADOBUTROL 7.5 ML: 604.72 INJECTION INTRAVENOUS at 09:29

## 2017-11-07 ENCOUNTER — ALLIED HEALTH/NURSE VISIT (OUTPATIENT)
Dept: URGENT CARE | Facility: RETAIL CLINIC | Age: 62
End: 2017-11-07
Payer: COMMERCIAL

## 2017-11-07 DIAGNOSIS — Z23 NEED FOR PROPHYLACTIC VACCINATION AND INOCULATION AGAINST INFLUENZA: Primary | ICD-10-CM

## 2017-11-07 PROCEDURE — 99207 ZZC NO CHARGE NURSE ONLY: CPT | Performed by: NURSE PRACTITIONER

## 2017-11-07 PROCEDURE — 90686 IIV4 VACC NO PRSV 0.5 ML IM: CPT | Performed by: NURSE PRACTITIONER

## 2017-11-07 PROCEDURE — 90471 IMMUNIZATION ADMIN: CPT | Performed by: NURSE PRACTITIONER

## 2017-11-07 NOTE — PROGRESS NOTES
Injectable Influenza Immunization Documentation    1.  Is the person to be vaccinated sick today?   No    2. Does the person to be vaccinated have an allergy to a component   of the vaccine?   No  Egg Allergy Algorithm Link    3. Has the person to be vaccinated ever had a serious reaction   to influenza vaccine in the past?   No    4. Has the person to be vaccinated ever had Guillain-Barré syndrome?   No  Patient instructed to remain in clinic for 20 minutes afterwards, and to report any adverse reaction to me immediately.  Prior to injection verified patient identity using patient's name and date of birth.      Form completed by Cori Harris

## 2017-11-07 NOTE — MR AVS SNAPSHOT
After Visit Summary   11/7/2017    Cecile Mays    MRN: [id number]           Patient Information     Date Of Birth          1955        Visit Information        Provider Department      11/7/2017 4:40 PM Jah Aguirre APRN CNP [facility]        Today's Diagnoses     Need for prophylactic vaccination and inoculation against influenza    -  1       Follow-ups after your visit        Who to contact     You can reach your care team any time of the day by calling [phone].  Notification of test results:  If you have an abnormal lab result, we will notify you by phone as soon as possible.         Additional Information About Your Visit        MyChart Information     [employer] gives you secure access to your electronic health record. If you see a primary care provider, you can also send messages to your care team and make appointments. If you have questions, please call your primary care clinic.  If you do not have a primary care provider, please call [phone] and they will assist you.        Care EveryWhere ID     This is your Care EveryWhere ID. This could be used by other organizations to access your [facility] medical records  [id number]        Your Vitals Were     Last Period                   02/05/2008            Blood Pressure from Last 3 Encounters:   01/27/17 136/70   12/15/16 136/74   08/24/16 116/66    Weight from Last 3 Encounters:   01/27/17 169 lb (76.7 kg)   12/15/16 174 lb 9.6 oz (79.2 kg)   08/24/16 169 lb (76.7 kg)              We Performed the Following     FLU VAC, SPLIT VIRUS IM > 3 YO (QUADRIVALENT) [85696]     Vaccine Administration, Initial [59496]        Primary Care Provider Office Phone # Fax #    Tommy Braulio Pringle [phone]       [address] [place] [address]        Equal Access to Services     MALA SU AH: Lisy Dave, bette dueñas, mini beebe  raad sravangiovanni myrandastuart lalore ah. So [facility] [phone].    ATENCIÓN: Si habla marli, tiene a godinez disposición servicios gratuitos de asistencia lingüística. Allison lima [phone].    We comply with applicable federal civil rights laws and [place] laws. We do not discriminate on the basis of race, color, national origin, age, disability, sex, sexual orientation, or gender identity.            Thank you!     Thank you for choosing [facility]  for your care. Our goal is always to provide you with excellent care. Hearing back from our patients is one way we can continue to improve our services. Please take a few minutes to complete the written survey that you may receive in the mail after your visit with us. Thank you!             Your Updated Medication List - Protect others around you: Learn how to safely use, store and throw away your medicines at www.disposemymeds.org.          This list is accurate as of: 11/7/17  5:01 PM.  Always use your most recent med list.                   Brand Name Dispense Instructions for use Diagnosis    albuterol 108 (90 BASE) MCG/ACT Inhaler    PROAIR HFA/PROVENTIL HFA/VENTOLIN HFA    1 Inhaler    Inhale 2 puffs into the lungs every 6 hours as needed for shortness of breath / dyspnea or wheezing    Acute bronchospasm       aspirin 81 MG tablet     90 tablet    Take 1 tablet (81 mg) by mouth daily    Routine general medical examination at a health care facility       busPIRone 15 MG tablet    BUSPAR    270 tablet    2 tablets in a.m and one tablet in p.m.    Adjustment disorder with anxious mood       calcium carbonate 1250 MG tablet    OS-RAIN 500 mg Solomon. Ca     Take 1 tablet (500 mg) by mouth daily        COPAXONE 20 MG/ML injection   Generic drug:  glatiramer      Inject 1 mL (20 mg) Subcutaneous daily        cycloSPORINE 0.05 % ophthalmic emulsion    RESTASIS     Place 1 drop into both eyes 2 times daily        LORazepam 1 MG tablet    ATIVAN    20 tablet    Take  1 tablet (1 mg) by mouth every 8 hours as needed for anxiety    Fifth cranial nerve disease or syndrome       mirtazapine 15 MG tablet    REMERON    30 tablet    TAKE ONE TABLET BY MOUTH AT BEDTIME    Multiple sclerosis (H)       omega 3 1000 MG Caps     90 capsule    Take 1 g by mouth daily        * traZODone 50 MG tablet    DESYREL    15 tablet    TAKE 1/2 TABLET BY MOUTH NIGHTLY AS NEEDED FOR SLEEP    Adjustment disorder with anxious mood       * traZODone 50 MG tablet    DESYREL    15 tablet    TAKE 1/2 TABLET BY MOUTH NIGHTLY AS NEEDED FOR SLEEP    Adjustment disorder with anxious mood       vitamin D 2000 UNITS tablet     100 tablet    3,000 units daily    Major depressive disorder, recurrent episode, mild (H)       WOMENS DAILY MULTIVITAMIN Tabs     30    1 TABLET DAILY        * Notice:  This list has 2 medication(s) that are the same as other medications prescribed for you. Read the directions carefully, and ask your doctor or other care provider to review them with you.

## 2017-11-20 DIAGNOSIS — G35 MULTIPLE SCLEROSIS (H): ICD-10-CM

## 2017-11-21 NOTE — TELEPHONE ENCOUNTER
Requested Prescriptions   Pending Prescriptions Disp Refills     mirtazapine (REMERON) 15 MG tablet [Pharmacy Med Name: MIRTAZAPINE 15MG TABS] 30 tablet 3     Sig: TAKE ONE TABLET BY MOUTH AT BEDTIME    Atypical Antidepressants Protocol Failed    11/20/2017  9:18 PM       Failed - Patient has PHQ-9 score less than 5 in past 6 months.    Please review PHQ-9 score.          Failed - Recent (6 mo) or future visit with authorizing provider's specialty    Patient had office visit in the last 6 months or has a visit in the next 30 days with authorizing provider.  See chart review.            Passed - Patient is age 18 or older       Passed - No active pregnancy on record       Passed - No positive pregnancy test in past 12 mos

## 2017-11-22 NOTE — TELEPHONE ENCOUNTER
Remeron  Routing refill request to provider for review/approval because:  Drug not on the [facility] refill protocol for associated diagnosis    Jose Gardiner RN, BSN

## 2017-11-24 RX ORDER — MIRTAZAPINE 15 MG/1
TABLET, FILM COATED ORAL
Qty: 30 TABLET | Refills: 1 | Status: SHIPPED | OUTPATIENT
Start: 2017-11-24 | End: 2018-01-08

## 2017-11-27 ENCOUNTER — TRANSFERRED RECORDS (OUTPATIENT)
Dept: HEALTH INFORMATION MANAGEMENT | Facility: CLINIC | Age: 62
End: 2017-11-27

## 2018-01-08 ENCOUNTER — OFFICE VISIT (OUTPATIENT)
Dept: FAMILY MEDICINE | Facility: CLINIC | Age: 63
End: 2018-01-08
Payer: COMMERCIAL

## 2018-01-08 VITALS
TEMPERATURE: 97.1 F | HEART RATE: 72 BPM | BODY MASS INDEX: 27.47 KG/M2 | SYSTOLIC BLOOD PRESSURE: 136 MMHG | RESPIRATION RATE: 16 BRPM | DIASTOLIC BLOOD PRESSURE: 66 MMHG | WEIGHT: 175 LBS | OXYGEN SATURATION: 98 % | HEIGHT: 67 IN

## 2018-01-08 DIAGNOSIS — G35 MULTIPLE SCLEROSIS (H): Primary | ICD-10-CM

## 2018-01-08 DIAGNOSIS — M99.03 SEGMENTAL DYSFUNCTION OF LUMBAR REGION: ICD-10-CM

## 2018-01-08 DIAGNOSIS — Z00.00 ROUTINE GENERAL MEDICAL EXAMINATION AT A HEALTH CARE FACILITY: ICD-10-CM

## 2018-01-08 DIAGNOSIS — G47.01 INSOMNIA DUE TO MEDICAL CONDITION: ICD-10-CM

## 2018-01-08 DIAGNOSIS — M99.02 SEGMENTAL DYSFUNCTION OF THORACIC REGION: ICD-10-CM

## 2018-01-08 DIAGNOSIS — M99.04 SEGMENTAL DYSFUNCTION OF SACRAL REGION: ICD-10-CM

## 2018-01-08 DIAGNOSIS — F43.22 ADJUSTMENT DISORDER WITH ANXIOUS MOOD: ICD-10-CM

## 2018-01-08 LAB
ANION GAP SERPL CALCULATED.3IONS-SCNC: 6 MMOL/L (ref 3–14)
BUN SERPL-MCNC: 13 MG/DL (ref 7–30)
CALCIUM SERPL-MCNC: 8.9 MG/DL (ref 8.5–10.1)
CHLORIDE SERPL-SCNC: 108 MMOL/L (ref 94–109)
CO2 SERPL-SCNC: 28 MMOL/L (ref 20–32)
CREAT SERPL-MCNC: 0.78 MG/DL (ref 0.52–1.04)
ERYTHROCYTE [DISTWIDTH] IN BLOOD BY AUTOMATED COUNT: 12.7 % (ref 10–15)
GFR SERPL CREATININE-BSD FRML MDRD: 76 ML/MIN/1.7M2
GLUCOSE SERPL-MCNC: 94 MG/DL (ref 70–99)
HCT VFR BLD AUTO: 41.1 % (ref 35–47)
HGB BLD-MCNC: 13.5 G/DL (ref 11.7–15.7)
MCH RBC QN AUTO: 29.9 PG (ref 26.5–33)
MCHC RBC AUTO-ENTMCNC: 32.8 G/DL (ref 31.5–36.5)
MCV RBC AUTO: 91 FL (ref 78–100)
PLATELET # BLD AUTO: 249 10E9/L (ref 150–450)
POTASSIUM SERPL-SCNC: 3.9 MMOL/L (ref 3.4–5.3)
RBC # BLD AUTO: 4.51 10E12/L (ref 3.8–5.2)
SODIUM SERPL-SCNC: 142 MMOL/L (ref 133–144)
WBC # BLD AUTO: 5 10E9/L (ref 4–11)

## 2018-01-08 PROCEDURE — 99215 OFFICE O/P EST HI 40 MIN: CPT | Performed by: FAMILY MEDICINE

## 2018-01-08 PROCEDURE — 80048 BASIC METABOLIC PNL TOTAL CA: CPT | Performed by: FAMILY MEDICINE

## 2018-01-08 PROCEDURE — 85027 COMPLETE CBC AUTOMATED: CPT | Performed by: FAMILY MEDICINE

## 2018-01-08 PROCEDURE — 36415 COLL VENOUS BLD VENIPUNCTURE: CPT | Performed by: FAMILY MEDICINE

## 2018-01-08 RX ORDER — PNV NO.95/FERROUS FUM/FOLIC AC 28MG-0.8MG
TABLET ORAL
COMMUNITY
Start: 2018-01-08

## 2018-01-08 RX ORDER — DICLOFENAC SODIUM 75 MG/1
75 TABLET, DELAYED RELEASE ORAL 2 TIMES DAILY
Qty: 60 TABLET | Refills: 1 | Status: SHIPPED | OUTPATIENT
Start: 2018-01-08 | End: 2018-02-05

## 2018-01-08 RX ORDER — TRAZODONE HYDROCHLORIDE 50 MG/1
TABLET, FILM COATED ORAL
Qty: 15 TABLET | Refills: 4 | Status: SHIPPED | OUTPATIENT
Start: 2018-01-08 | End: 2018-02-05

## 2018-01-08 ASSESSMENT — ANXIETY QUESTIONNAIRES
IF YOU CHECKED OFF ANY PROBLEMS ON THIS QUESTIONNAIRE, HOW DIFFICULT HAVE THESE PROBLEMS MADE IT FOR YOU TO DO YOUR WORK, TAKE CARE OF THINGS AT HOME, OR GET ALONG WITH OTHER PEOPLE: VERY DIFFICULT
7. FEELING AFRAID AS IF SOMETHING AWFUL MIGHT HAPPEN: SEVERAL DAYS
2. NOT BEING ABLE TO STOP OR CONTROL WORRYING: NEARLY EVERY DAY
5. BEING SO RESTLESS THAT IT IS HARD TO SIT STILL: NOT AT ALL
1. FEELING NERVOUS, ANXIOUS, OR ON EDGE: NOT AT ALL
GAD7 TOTAL SCORE: 13
3. WORRYING TOO MUCH ABOUT DIFFERENT THINGS: NEARLY EVERY DAY
6. BECOMING EASILY ANNOYED OR IRRITABLE: NEARLY EVERY DAY

## 2018-01-08 ASSESSMENT — PAIN SCALES - GENERAL: PAINLEVEL: MODERATE PAIN (4)

## 2018-01-08 ASSESSMENT — PATIENT HEALTH QUESTIONNAIRE - PHQ9
5. POOR APPETITE OR OVEREATING: NEARLY EVERY DAY
SUM OF ALL RESPONSES TO PHQ QUESTIONS 1-9: 6

## 2018-01-08 NOTE — PROGRESS NOTES
SUBJECTIVE:   Cecile Mays is a 62 year old female who presents to clinic today for the following health issues:      Hyperlipidemia Follow-Up      Rate your low fat/cholesterol diet?: good    Taking statin?  No    Other lipid medications/supplements?:  Krill oil daily    Anxiety  And Depression Follow-Up    Status since last visit: Worsened     Other associated symptoms:crying    Complicating factors:   Significant life event: Yes-     Current substance abuse: None  Depression symptoms: Yes-    LEATHA-7 SCORE 1/27/2017   Total Score 13       GAD7            Recheck Reflux      Amount of exercise or physical activity: None    Problems taking medications regularly: No    Medication side effects: c/o vomiting    Diet: low salt and low fat/cholesterol        Patient is here to discuss her multiple medical concerns.  She remains overwhelmed with her MS.  Patient was seen once at [facility] in requested to have someone more local manage MS.  Current neurologist and most recent visit went poorly.  She does not feel that he is listening to her well.  She remains on interferon beta with moderate benefit.  She continues to have vision difficulty including indistinct vision, double vision.  She recently had an MRI which showed mostly stable lesions but one in the cervical area.  She is very fearful for her long-term health.  She realizes her job is very stressful but currently she is laid off for the winter.  This in itself is a relief but makes her worry.  She remains employed much to do with continuing with insurance.  If she obtains insurance on the open market it would be very expensive in her medications for MS are very expensive.  She realizes she is frustrated and taking it on those around her.  Her neurologist recommended paroxetine to help with her persistent sleep difficulties and agitation.  She wonders if this might be medication.  Trazodone does not really make much difference for her.   She can sleep for 2-4 hours after falling asleep easily and then she is awake with her mind whirling and twirling and she could not get back to sleep.  She has long-standing back pain with apparently scarring per chiropractic report.  Some of the treatments have helped but she is not able to continue always with these treatments.  She is wondering if there is anything else that might be done including requests about acupuncture.  She is not interested in pain medication.  She is worried that she is taking too much ibuprofen and this works better than naproxen.  Essentially she just feels overwhelmed and angry with life and worried about her long-term well-being.  Her  has physical health difficulties which are currently stable with some new adjustments of medication.  However she anticipates he will outlive her and she wonders what she will be doing with her MS and being able to keep independence after he dies.  Right now she drives only in areas where she feels comfortable because of vision issues.  She knows if she has to go to the [facility] for evaluation she [place] not [place].    Problem list and histories reviewed & adjusted, as indicated.  Additional history: as documented    BP Readings from Last 3 Encounters:   01/08/18 136/66   01/27/17 136/70   12/15/16 136/74    Wt Readings from Last 3 Encounters:   01/08/18 175 lb (79.4 kg)   01/27/17 169 lb (76.7 kg)   12/15/16 174 lb 9.6 oz (79.2 kg)                  Labs reviewed in EPIC      Reviewed and updated as needed this visit by clinical staff     Reviewed and updated as needed this visit by Provider       ROS:  C: NEGATIVE for fever, chills, change in weight  INTEGUMENTARY/SKIN: NEGATIVE for worrisome rashes, moles or lesions  E/M: NEGATIVE for ear, mouth and throat problems  R: NEGATIVE for significant cough or SOB  CV: NEGATIVE for chest pain, palpitations or peripheral edema  GI: NEGATIVE for nausea, abdominal pain,  "heartburn, or change in bowel habits  : negative for, dysuria, incontinence and vaginal discharge  MUSCULOSKELETAL: Back pain extending into the legs and through the pelvis has been very limiting.  Otherwise limbs seem to work well  NEURO: Mild of any gait disturbance, vision is the main issue in terms of physical limitation, numbness and tingling on left and right sides of the face now, taste disturbance  ENDOCRINE: NEGATIVE for temperature intolerance, skin/hair changes  HEME/ALLERGY/IMMUNE: NEGATIVE for bleeding problems  PSYCHIATRIC: agitation, anxiety, concentration difficulty, depressed mood, feelings of worthlessness/guilt, hopelessness, insomnia long-standing persistent getting worse as things go on, obsessive thoughts and psychomotor agitation    OBJECTIVE:     /66 (BP Location: Right arm, Patient Position: Sitting, Cuff Size: Adult Regular)  Pulse 72  Temp 97.1  F (36.2  C) (Temporal)  Resp 16  Ht 5' 6.5\" (1.689 m)  Wt 175 lb (79.4 kg)  LMP 02/05/2008  SpO2 98%  BMI 27.82 kg/m2  Body mass index is 27.82 kg/(m^2).  GENERAL: healthy, alert and no distress  EYES: Eyes grossly normal to inspection and no nystagmus was noted.  Vision seemed conjugate, she distinguish reading at normal distance, pupils were equal and responsive  HENT: normal cephalic/atraumatic, ear canals and TM's normal, nose and mouth without ulcers or lesions, oropharynx clear and oral mucous membranes moist  NECK: no adenopathy, no asymmetry, masses, or scars and thyroid normal to palpation  RESP: lungs clear to auscultation - no rales, rhonchi or wheezes  CV: regular rate and rhythm, normal S1 S2, no S3 or S4, no murmur, click or rub, no peripheral edema and peripheral pulses strong  ABDOMEN: soft, nontender, no hepatosplenomegaly, no masses and bowel sounds normal  MS: no gross musculoskeletal defects noted, no edema  SKIN: no suspicious lesions or rashes  NEURO: Normal strength and tone, mentation intact and gait was " "acceptable and she is easily on and off the table.  Did not specifically test reflexes.  PSYCH: mentation appears normal, tearful, anxious, fatigued, judgement and insight intact and appearance well groomed    Diagnostic Test Results:  Results for orders placed or performed in visit on 01/08/18 (from the past 24 hour(s))   CBC with platelets   Result Value Ref Range    WBC 5.0 4.0 - 11.0 10e9/L    RBC Count 4.51 3.8 - 5.2 10e12/L    Hemoglobin 13.5 11.7 - 15.7 g/dL    Hematocrit 41.1 35.0 - 47.0 %    MCV 91 78 - 100 fl    MCH 29.9 26.5 - 33.0 pg    MCHC 32.8 31.5 - 36.5 g/dL    RDW 12.7 10.0 - 15.0 %    Platelet Count 249 150 - 450 10e9/L   Basic metabolic panel   Result Value Ref Range    Sodium 142 133 - 144 mmol/L    Potassium 3.9 3.4 - 5.3 mmol/L    Chloride 108 94 - 109 mmol/L    Carbon Dioxide 28 20 - 32 mmol/L    Anion Gap 6 3 - 14 mmol/L    Glucose 94 70 - 99 mg/dL    Urea Nitrogen 13 7 - 30 mg/dL    Creatinine 0.78 0.52 - 1.04 mg/dL    GFR Estimate 76 >60 mL/min/1.7m2    GFR Estimate If Black >90 >60 mL/min/1.7m2    Calcium 8.9 8.5 - 10.1 mg/dL       ASSESSMENT/PLAN:         BP Screening:   Last 3 BP Readings:    BP Readings from Last 3 Encounters:   01/08/18 136/66   01/27/17 136/70   12/15/16 136/74       The following was recommended to the patient:  Re-screen BP within a year and recommended lifestyle modifications  Tobacco Cessation:   reports that she has never smoked. She has never used smokeless tobacco.      BMI:   Estimated body mass index is 27.82 kg/(m^2) as calculated from the following:    Height as of this encounter: 5' 6.5\" (1.689 m).    Weight as of this encounter: 175 lb (79.4 kg).   Weight management plan: Discussed healthy diet and exercise guidelines and patient will follow up in 3 months in clinic to re-evaluate.  Or less.      1. Multiple sclerosis (H)  MS with still symptoms despite treatment mostly vision.  We discussed options and she will most [facility].  We will help arrange this visit.  - CBC with platelets  - Basic metabolic panel  - diclofenac (VOLTAREN) 75 MG EC tablet; Take 1 tablet (75 mg) by mouth 2 times daily  Dispense: 60 tablet; Refill: 1    2. Routine general medical examination at a health care facility  Just speaks for itself to give aspirin or reason for use  - aspirin 81 MG tablet; Take 1 tablet (81 mg) by mouth daily  Dispense: 90 tablet; Refill: 3    3. Adjustment disorder with anxious mood  Overwhelmed with illness and fears for the future.  She has been using trazodone but we also will add sertraline starting at 50 mg if she can tolerate this much.  After 2-4 weeks we will know if she should be increased from 50 all the way up to 200.  We discussed why this SSRI might be a better choice than paroxetine.  She has been on fluoxetine in the past and did not do well with that one.  - traZODone (DESYREL) 50 MG tablet; TAKE 1/2 TABLET BY MOUTH NIGHTLY AS NEEDED FOR SLEEP  Dispense: 15 tablet; Refill: 4  - sertraline (ZOLOFT) 50 MG tablet; Take 1 tablet (50 mg) by mouth daily  Dispense: 30 tablet; Refill: 1    4. Segmental dysfunction of sacral region  Discussed stretching and acupuncture and we will set her up for pain clinic referral    5. Segmental dysfunction of lumbar region  See above    6. Segmental dysfunction of thoracic region  See above    7. Insomnia due to medical condition  Sertraline will be the medication that we initially start to help with sleep.  It may be that Belsomra, Lunesta, Sonata or 1 of the other sleep aids may be appropriate.  Sleep issues are very common in MS and with her underlying depression and anxiety issues certainly are not going to get better.    We talked about quitting work and how physically that may be the best thing she could do, however very cognizant that insurance questions and concerns may also be very detrimental if this is not taking care of correctly.  Talked about the possibility of  mental health counseling given that she is done this multiple times in until medication is used that may help quiet her brain, we may not get very far with this.      MEDICATIONS:  Continue current medications without change  Regular exercise  Patient Instructions   Janell short yoga, check on line about this stretching is the most essential  Begin sertraline 25-50 mg and after 2-4 weeks consider 50 mg  This will be mostly for sleep but it may raise mood  Use diclofenac twice daily, every day if needed    Time spent with greater than 50% spent in discussion, making the plan, or filling out paperwork with patient for illness/treatments was 40 minutes or more.    Tommy Pringle MD  [facility]

## 2018-01-08 NOTE — PATIENT INSTRUCTIONS
Janell lopez page yoga, check on line about this stretching is the most essential  Begin sertraline 25-50 mg and after 2-4 weeks consider 50 mg  This will be mostly for sleep but it may raise mood  Use diclofenac twice daily, every day if needed

## 2018-01-08 NOTE — NURSING NOTE
"Chief Complaint   Patient presents with     Anxiety     Recheck     Lipids     Recheck     Gastrophageal Reflux     Recheck       Initial /66 (BP Location: Right arm, Patient Position: Sitting, Cuff Size: Adult Regular)  Pulse 72  Temp 97.1  F (36.2  C) (Temporal)  Resp 16  Ht 5' 6.5\" (1.689 m)  Wt 175 lb (79.4 kg)  LMP 02/05/2008  SpO2 98%  BMI 27.82 kg/m2 Estimated body mass index is 27.82 kg/(m^2) as calculated from the following:    Height as of this encounter: 5' 6.5\" (1.689 m).    Weight as of this encounter: 175 lb (79.4 kg).   Patient declines to be scheduled for pap at this point  Medication Reconciliation: complete  "

## 2018-01-09 ASSESSMENT — ANXIETY QUESTIONNAIRES: GAD7 TOTAL SCORE: 13

## 2018-01-17 ENCOUNTER — TELEPHONE (OUTPATIENT)
Dept: FAMILY MEDICINE | Facility: CLINIC | Age: 63
End: 2018-01-17

## 2018-01-17 DIAGNOSIS — R30.0 DYSURIA: Primary | ICD-10-CM

## 2018-01-17 DIAGNOSIS — N30.00 ACUTE CYSTITIS WITHOUT HEMATURIA: Primary | ICD-10-CM

## 2018-01-17 DIAGNOSIS — R30.0 DYSURIA: ICD-10-CM

## 2018-01-17 LAB
ALBUMIN UR-MCNC: NEGATIVE MG/DL
APPEARANCE UR: ABNORMAL
BACTERIA #/AREA URNS HPF: ABNORMAL /HPF
BILIRUB UR QL STRIP: NEGATIVE
COLOR UR AUTO: YELLOW
GLUCOSE UR STRIP-MCNC: NEGATIVE MG/DL
HGB UR QL STRIP: NEGATIVE
KETONES UR STRIP-MCNC: NEGATIVE MG/DL
LEUKOCYTE ESTERASE UR QL STRIP: ABNORMAL
MUCOUS THREADS #/AREA URNS LPF: PRESENT /LPF
NITRATE UR QL: POSITIVE
PH UR STRIP: 6 PH (ref 5–7)
RBC #/AREA URNS AUTO: 3 /HPF (ref 0–2)
SOURCE: ABNORMAL
SP GR UR STRIP: 1.01 (ref 1–1.03)
SQUAMOUS #/AREA URNS AUTO: <1 /HPF (ref 0–1)
UROBILINOGEN UR STRIP-MCNC: 0 MG/DL (ref 0–2)
WBC #/AREA URNS AUTO: 65 /HPF (ref 0–2)

## 2018-01-17 PROCEDURE — 81001 URINALYSIS AUTO W/SCOPE: CPT | Performed by: FAMILY MEDICINE

## 2018-01-17 PROCEDURE — 87186 SC STD MICRODIL/AGAR DIL: CPT | Performed by: FAMILY MEDICINE

## 2018-01-17 PROCEDURE — 87088 URINE BACTERIA CULTURE: CPT | Performed by: FAMILY MEDICINE

## 2018-01-17 PROCEDURE — 87086 URINE CULTURE/COLONY COUNT: CPT | Performed by: FAMILY MEDICINE

## 2018-01-17 RX ORDER — SULFAMETHOXAZOLE/TRIMETHOPRIM 800-160 MG
1 TABLET ORAL 2 TIMES DAILY
Qty: 14 TABLET | Refills: 0 | Status: SHIPPED | OUTPATIENT
Start: 2018-01-17 | End: 2018-02-05

## 2018-01-17 NOTE — TELEPHONE ENCOUNTER
Reason for Call: Request for an order or referral:    Order or referral being requested: lab for Bladder infection     Date needed: as soon as possible    Has the patient been seen by the PCP for this problem? YES    Additional comments:  urine smells, lower back pain and some pain when urinating    Phone number Patient can be reached at:  Cell number on file:    Telephone Information:   Mobile [phone]       Best Time:  Any     Can we leave a detailed message on this number?  YES    Call taken on 1/17/2018 at 11:18 AM by Nasima Acosta

## 2018-01-19 LAB
BACTERIA SPEC CULT: ABNORMAL
Lab: ABNORMAL
SPECIMEN SOURCE: ABNORMAL

## 2018-02-01 DIAGNOSIS — E78.5 HYPERLIPIDEMIA LDL GOAL <130: ICD-10-CM

## 2018-02-01 LAB
CHOLEST SERPL-MCNC: 194 MG/DL
HDLC SERPL-MCNC: 49 MG/DL
LDLC SERPL CALC-MCNC: 109 MG/DL
NONHDLC SERPL-MCNC: 145 MG/DL
TRIGL SERPL-MCNC: 180 MG/DL

## 2018-02-01 PROCEDURE — 36415 COLL VENOUS BLD VENIPUNCTURE: CPT | Performed by: FAMILY MEDICINE

## 2018-02-01 PROCEDURE — 80061 LIPID PANEL: CPT | Performed by: FAMILY MEDICINE

## 2018-02-05 ENCOUNTER — OFFICE VISIT (OUTPATIENT)
Dept: FAMILY MEDICINE | Facility: CLINIC | Age: 63
End: 2018-02-05
Payer: COMMERCIAL

## 2018-02-05 ENCOUNTER — HOSPITAL ENCOUNTER (OUTPATIENT)
Dept: MAMMOGRAPHY | Facility: CLINIC | Age: 63
Discharge: HOME | End: 2018-02-05
Attending: FAMILY MEDICINE | Admitting: FAMILY MEDICINE
Payer: COMMERCIAL

## 2018-02-05 VITALS
RESPIRATION RATE: 16 BRPM | HEART RATE: 72 BPM | SYSTOLIC BLOOD PRESSURE: 116 MMHG | WEIGHT: 174 LBS | BODY MASS INDEX: 27.31 KG/M2 | TEMPERATURE: 97.4 F | HEIGHT: 67 IN | OXYGEN SATURATION: 100 % | DIASTOLIC BLOOD PRESSURE: 64 MMHG

## 2018-02-05 DIAGNOSIS — Z12.31 VISIT FOR SCREENING MAMMOGRAM: ICD-10-CM

## 2018-02-05 DIAGNOSIS — G47.01 INSOMNIA DUE TO MEDICAL CONDITION: ICD-10-CM

## 2018-02-05 DIAGNOSIS — J98.01 ACUTE BRONCHOSPASM: ICD-10-CM

## 2018-02-05 DIAGNOSIS — Z01.411 ENCOUNTER FOR GYNECOLOGICAL EXAMINATION WITH ABNORMAL FINDING: Primary | ICD-10-CM

## 2018-02-05 DIAGNOSIS — F43.22 ADJUSTMENT DISORDER WITH ANXIOUS MOOD: ICD-10-CM

## 2018-02-05 DIAGNOSIS — G35 MULTIPLE SCLEROSIS (H): ICD-10-CM

## 2018-02-05 PROCEDURE — 77067 SCR MAMMO BI INCL CAD: CPT

## 2018-02-05 PROCEDURE — G0145 SCR C/V CYTO,THINLAYER,RESCR: HCPCS | Performed by: FAMILY MEDICINE

## 2018-02-05 PROCEDURE — 87624 HPV HI-RISK TYP POOLED RSLT: CPT | Performed by: FAMILY MEDICINE

## 2018-02-05 PROCEDURE — 99396 PREV VISIT EST AGE 40-64: CPT | Performed by: FAMILY MEDICINE

## 2018-02-05 RX ORDER — ALBUTEROL SULFATE 90 UG/1
2 AEROSOL, METERED RESPIRATORY (INHALATION) EVERY 6 HOURS PRN
Qty: 1 INHALER | Refills: 1 | Status: SHIPPED | OUTPATIENT
Start: 2018-02-05 | End: 2019-07-18

## 2018-02-05 ASSESSMENT — PAIN SCALES - GENERAL: PAINLEVEL: NO PAIN (0)

## 2018-02-05 NOTE — PROGRESS NOTES
SUBJECTIVE:   CC: Cecile Mays is an 62 year old woman who presents for preventive health visit.     Physical   Annual:     Getting at least 3 servings of Calcium per day::  Yes    Bi-annual eye exam::  Yes    Dental care twice a year::  Yes    Sleep apnea or symptoms of sleep apnea::  None    Diet::  Regular (no restrictions)    Frequency of exercise::  None    Taking medications regularly::  Yes    Medication side effects::  Not applicable and None    Additional concerns today::  YES                    Today's PHQ-2 Score:   PHQ-2 ( 1999 [employer]) 2/5/2018   Q1: Little interest or pleasure in doing things 0   Q2: Feeling down, depressed or hopeless 0   PHQ-2 Score 0   Q1: Little interest or pleasure in doing things Not at all   Q2: Feeling down, depressed or hopeless Not at all   PHQ-2 Score 0       Abuse: Current or Past(Physical, Sexual or Emotional)- No  Do you feel safe in your environment - Yes    Social History   Substance Use Topics     Smoking status: Never Smoker     Smokeless tobacco: Never Used     Alcohol use No     Alcohol Use 2/5/2018   If you drink alcohol, do you typically have greater than 3 drinks per day OR greater than 7 drinks per week?   No       Reviewed orders with patient.  Reviewed health maintenance and updated orders accordingly - Yes  Labs reviewed in EPIC  BP Readings from Last 3 Encounters:   02/05/18 116/64   01/08/18 136/66   01/27/17 136/70    Wt Readings from Last 3 Encounters:   02/05/18 174 lb (78.9 kg)   01/08/18 175 lb (79.4 kg)   01/27/17 169 lb (76.7 kg)                    Patient over age 50, mutual decision to screen reflected in health maintenance.    Pertinent mammograms are reviewed under the imaging tab.  History of abnormal Pap smear: NO - age 30-65 PAP every 5 years with negative HPV co-testing recommended    Reviewed and updated as needed this visit by clinical staff         Reviewed and updated as needed this visit by Provider            Review of  "Systems  C: NEGATIVE for fever, chills, change in weight  I: NEGATIVE for worrisome rashes, moles or lesions  E: NEGATIVE for vision changes or irritation  ENT: NEGATIVE for ear, mouth and throat problems  R: NEGATIVE for significant cough or SOB  B: NEGATIVE for masses, tenderness or discharge  CV: NEGATIVE for chest pain, palpitations or peripheral edema  GI: NEGATIVE for nausea, abdominal pain, heartburn, or change in bowel habits  : NEGATIVE for unusual urinary or vaginal symptoms. Periods are regular.  M: NEGATIVE for significant arthralgias or myalgia  NEURO: Controlled symptoms with her MS currently.  Still have double vision if she is tired and some weakness and incoordination.  PSYCHIATRIC: Patient's mood has improved greatly since sertraline was started.  She is tolerating it with no side effects.  She is highly considering not working next year.  Sleep is better but she still awakens early in the morning but able to get back to sleep now.     OBJECTIVE:   /64 (BP Location: Left arm, Patient Position: Sitting, Cuff Size: Adult Large)  Pulse 72  Temp 97.4  F (36.3  C) (Temporal)  Resp 16  Ht 5' 6.5\" (1.689 m)  Wt 174 lb (78.9 kg)  LMP 02/05/2008  SpO2 100%  BMI 27.66 kg/m2  Physical Exam  GENERAL: healthy, alert and no distress  EYES: Eyes grossly normal to inspection, PERRL and conjunctivae and sclerae normal  NECK: no adenopathy, no asymmetry, masses, or scars and thyroid normal to palpation  RESP: lungs clear to auscultation - no rales, rhonchi or wheezes  CV: regular rate and rhythm, normal S1 S2, no S3 or S4, no murmur, click or rub, no peripheral edema and peripheral pulses strong  ABDOMEN: soft, nontender, no hepatosplenomegaly, no masses and bowel sounds normal   (female): normal female external genitalia, normal urethral meatus , vaginal mucosa pink, moist, well rugated and normal cervix, adnexae, and uterus without masses.  MS: no gross musculoskeletal defects noted, no " "edema  SKIN: no suspicious lesions or rashes and she does have mild discoloration on her thighs both pigmented and deep pigmented where she gives her injections for MS medications.  These are benign looking  NEURO: Normal strength and tone, sensory exam grossly normal, mentation intact and no symptoms of MS noted today.  PSYCH: mentation appears normal, affect normal/bright    ASSESSMENT/PLAN:       ICD-10-CM    1. Encounter for gynecological examination with abnormal finding Z01.411 Pap imaged thin layer screen with HPV - recommended age 30 - 65     HPV High Risk Types DNA Cervical   2. Adjustment disorder with anxious mood F43.22 sertraline (ZOLOFT) 50 MG tablet     DISCONTINUED: sertraline (ZOLOFT) 50 MG tablet   3. Acute bronchospasm J98.01 albuterol (PROAIR HFA/PROVENTIL HFA/VENTOLIN HFA) 108 (90 BASE) MCG/ACT Inhaler   4. Multiple sclerosis (H) G35    5. Insomnia due to medical condition G47.01      Currently no change in treatment is anticipated and would follow-up with mood disorder this summer.  COUNSELING:  Reviewed preventive health counseling, as reflected in patient instructions       Regular exercise       Healthy diet/nutrition         reports that she has never smoked. She has never used smokeless tobacco.    Estimated body mass index is 27.82 kg/(m^2) as calculated from the following:    Height as of 1/8/18: 5' 6.5\" (1.689 m).    Weight as of 1/8/18: 175 lb (79.4 kg).   Weight management plan: Discussed healthy diet and exercise guidelines and patient will follow up in 6 months in clinic to re-evaluate.    Counseling Resources:  ATP IV Guidelines  Pooled Cohorts Equation Calculator  Breast Cancer Risk Calculator  FRAX Risk Assessment  ICSI Preventive Guidelines  Dietary Guidelines for Americans, 2010  USDA's MyPlate  ASA Prophylaxis  Lung CA Screening    Tommy Braulio Pringle MD  [facility]  Answers for HPI/ROS submitted by the patient on 2/5/2018   PHQ-2 Score: 0    "

## 2018-02-05 NOTE — NURSING NOTE
"Chief Complaint   Patient presents with     Physical     with pap       Initial /64 (BP Location: Left arm, Patient Position: Sitting, Cuff Size: Adult Large)  Pulse 72  Temp 97.4  F (36.3  C) (Temporal)  Resp 16  Ht 5' 6.5\" (1.689 m)  Wt 174 lb (78.9 kg)  LMP 02/05/2008  SpO2 100%  BMI 27.66 kg/m2 Estimated body mass index is 27.66 kg/(m^2) as calculated from the following:    Height as of this encounter: 5' 6.5\" (1.689 m).    Weight as of this encounter: 174 lb (78.9 kg).   Pt. declines need for nurse present during exam.   Medication Reconciliation: complete  "

## 2018-02-05 NOTE — MR AVS SNAPSHOT
After Visit Summary   2/5/2018    Cecile Mays    MRN: [id number]           Patient Information     Date Of Birth          1955        Visit Information        Provider Department      2/5/2018 8:00 AM Tommy Pringle MD [facility]        Today's Diagnoses     Encounter for gynecological examination with abnormal finding    -  1    Adjustment disorder with anxious mood        Acute bronchospasm          Care Instructions      Preventive Health Recommendations  Female Ages 50 - 64    Yearly exam: See your health care provider every year in order to  o Review health changes.   o Discuss preventive care.    o Review your medicines if your doctor has prescribed any.      Get a Pap test every three years (unless you have an abnormal result and your provider advises testing more often).    If you get Pap tests with HPV test, you only need to test every 5 years, unless you have an abnormal result.     You do not need a Pap test if your uterus was removed (hysterectomy) and you have not had cancer.    You should be tested each year for STDs (sexually transmitted diseases) if you're at risk.     Have a mammogram every 1 to 2 years.    Have a colonoscopy at age 50, or have a yearly FIT test (stool test). These exams screen for colon cancer.      Have a cholesterol test every 5 years, or more often if advised.    Have a diabetes test (fasting glucose) every three years. If you are at risk for diabetes, you should have this test more often.     If you are at risk for osteoporosis (brittle bone disease), think about having a bone density scan (DEXA).    Shots: Get a flu shot each year. Get a tetanus shot every 10 years.    Nutrition:     Eat at least 5 servings of fruits and vegetables each day.    Eat whole-grain bread, whole-wheat pasta and brown rice instead of white grains and rice.    Talk to your provider about Calcium and Vitamin D.     Lifestyle    Exercise at least 150  "minutes a week (30 minutes a day, 5 days a week). This will help you control your weight and prevent disease.    Limit alcohol to one drink per day.    No smoking.     Wear sunscreen to prevent skin cancer.     See your dentist every six months for an exam and cleaning.    See your eye doctor every 1 to 2 years.  The 10-year ASCVD risk score ([place] ILIR Jr, et al., 2013) is: 3.2%    Values used to calculate the score:      Age: 62 years      Sex: Female      Is Non- : No      Diabetic: No      Tobacco smoker: No      Systolic Blood Pressure: 116 mmHg      Is BP treated: No      HDL Cholesterol: 49 mg/dL      Total Cholesterol: 194 mg/dL    Viraj Santoyo MD   Psychiatry & Neurology - Neurology           Follow-ups after your visit        Your next 10 appointments already scheduled     Feb 05, 2018 10:30 AM CST   (Arrive by 10:15 AM)   MA SCREENING DIGITAL BILATERAL with PHMA1   [facility] ([facility])    [address] [place] [id number] [phone]           Do not use any powder, lotion or deodorant under your arms or on your breast. If you do, we will ask you to remove it before your exam.  Wear comfortable, two-piece clothing.  If you have any allergies, tell your care team.  Bring any previous mammograms from other facilities or have them mailed to the breast center. Three-dimensional (3D) mammograms are available at [facility] locations in [facility], [facility], and [place]. [facility] locations include [facility] in [place]. Benefits of 3D mammograms include: - Improved rate of cancer detection - Decreases your chance of having to go back for more tests, which means fewer: - \"False-positive\" results (This means that there is an abnormal area but it isn't cancer.) - Invasive testing procedures, such as a biopsy or surgery - Can provide clearer images of the " "breast if you have dense breast tissue. 3D mammography is an optional exam that anyone can have with a 2D mammogram. It doesn't replace or take the place of a 2D mammogram. 2D mammograms remain an effective screening test for all women.  Not all insurance companies cover the cost of a 3D mammogram. Check with your insurance.              Who to contact     If you have questions or need follow up information about today's clinic visit or your schedule please contact [facility] directly at [phone].  Normal or non-critical lab and imaging results will be communicated to you by SNTMNThart, letter or phone within 4 business days after the clinic has received the results. If you do not hear from us within 7 days, please contact the clinic through Lift[employer] or phone. If you have a critical or abnormal lab result, we will notify you by phone as soon as possible.  Submit refill requests through Lift[employer] or call your pharmacy and they will forward the refill request to us. Please allow 3 business days for your refill to be completed.          Additional Information About Your Visit        LiftDNA Information     L[employer] gives you secure access to your electronic health record. If you see a primary care provider, you can also send messages to your care team and make appointments. If you have questions, please call your primary care clinic.  If you do not have a primary care provider, please call [phone] and they will assist you.        Care EveryWhere ID     This is your Care EveryWhere ID. This could be used by other organizations to access your [place] medical records  [id number]        Your Vitals Were     Pulse Temperature Respirations Height Last Period Pulse Oximetry    72 97.4  F (36.3  C) (Temporal) 16 5' 6.5\" (1.689 m) 02/05/2008 100%    BMI (Body Mass Index)                   27.66 kg/m2            Blood Pressure from Last 3 Encounters:   02/05/18 116/64   01/08/18 136/66   01/27/17 136/70    " Weight from Last 3 Encounters:   02/05/18 174 lb (78.9 kg)   01/08/18 175 lb (79.4 kg)   01/27/17 169 lb (76.7 kg)              We Performed the Following     HPV High Risk Types DNA Cervical     Pap imaged thin layer screen with HPV - recommended age 30 - 65          Today's Medication Changes          These changes are accurate as of 2/5/18  8:39 AM.  If you have any questions, ask your nurse or doctor.               Start taking these medicines.        Dose/Directions    sertraline 50 MG tablet   Commonly known as:  ZOLOFT   Used for:  Adjustment disorder with anxious mood   Started by:  Tommy rPingle MD        Take 1.5 tablets a day   Quantity:  135 tablet   Refills:  3            Where to get your medicines      These medications were sent to [address]  [address], [facility] [address]     Phone:  [phone]     albuterol 108 (90 BASE) MCG/ACT Inhaler    sertraline 50 MG tablet                Primary Care Provider Office Phone # Fax #    Tommy Pringle [phone] [address] [place] [address]        Equal Access to Services     [facility]: Hadii jarod ku hadasho Sosarithaali, waaxda luqadaha, qaybta kaalmada adeegyada, mini renee . So [facility] [phone].    ATENCIÓN: Si habla español, tiene a godinez disposición servicios gratuitos de asistencia lingüística. [facility] [phone].    We comply with applicable federal civil rights laws and [place] laws. We do not discriminate on the basis of race, color, national origin, age, disability, sex, sexual orientation, or gender identity.            Thank you!     Thank you for choosing [facility]  for your care. Our goal is always to provide you with excellent care. Hearing back from our patients is one way we can continue to improve our services. Please take a few minutes to complete the written survey that you may receive in the mail after  your visit with us. Thank you!             Your Updated Medication List - Protect others around you: Learn how to safely use, store and throw away your medicines at www.disposemymeds.org.          This list is accurate as of 2/5/18  8:39 AM.  Always use your most recent med list.                   Brand Name Dispense Instructions for use Diagnosis    albuterol 108 (90 BASE) MCG/ACT Inhaler    PROAIR HFA/PROVENTIL HFA/VENTOLIN HFA    1 Inhaler    Inhale 2 puffs into the lungs every 6 hours as needed for shortness of breath / dyspnea or wheezing    Acute bronchospasm       aspirin 81 MG tablet     90 tablet    Take 1 tablet (81 mg) by mouth daily    Routine general medical examination at a health care facility       B-12 1000 MCG Tbcr     100 tablet    Take 1,000 mcg by mouth daily        CALCIUM 600/VITAMIN D3 600-800 MG-UNIT Tabs   Generic drug:  Calcium Carb-Cholecalciferol      daily        glucosamine msm complex Tabs tablet     100 tablet    Take 1 tablet by mouth daily        Interferon Beta-1a 44 MCG/0.5ML Soaj      Taking Mon Weds and Friday        Krill Oil Omega-3 500 MG Caps      2 daily        sertraline 50 MG tablet    ZOLOFT    135 tablet    Take 1.5 tablets a day    Adjustment disorder with anxious mood       vitamin D 2000 UNITS tablet     100 tablet    3,000 units daily    Major depressive disorder, recurrent episode, mild (H)       WOMENS DAILY MULTIVITAMIN Tabs     30    1 TABLET DAILY

## 2018-02-05 NOTE — PATIENT INSTRUCTIONS
Preventive Health Recommendations  Female Ages 50 - 64    Yearly exam: See your health care provider every year in order to  o Review health changes.   o Discuss preventive care.    o Review your medicines if your doctor has prescribed any.      Get a Pap test every three years (unless you have an abnormal result and your provider advises testing more often).    If you get Pap tests with HPV test, you only need to test every 5 years, unless you have an abnormal result.     You do not need a Pap test if your uterus was removed (hysterectomy) and you have not had cancer.    You should be tested each year for STDs (sexually transmitted diseases) if you're at risk.     Have a mammogram every 1 to 2 years.    Have a colonoscopy at age 50, or have a yearly FIT test (stool test). These exams screen for colon cancer.      Have a cholesterol test every 5 years, or more often if advised.    Have a diabetes test (fasting glucose) every three years. If you are at risk for diabetes, you should have this test more often.     If you are at risk for osteoporosis (brittle bone disease), think about having a bone density scan (DEXA).    Shots: Get a flu shot each year. Get a tetanus shot every 10 years.    Nutrition:     Eat at least 5 servings of fruits and vegetables each day.    Eat whole-grain bread, whole-wheat pasta and brown rice instead of white grains and rice.    Talk to your provider about Calcium and Vitamin D.     Lifestyle    Exercise at least 150 minutes a week (30 minutes a day, 5 days a week). This will help you control your weight and prevent disease.    Limit alcohol to one drink per day.    No smoking.     Wear sunscreen to prevent skin cancer.     See your dentist every six months for an exam and cleaning.    See your eye doctor every 1 to 2 years.  The 10-year ASCVD risk score ([place] HAZEL Jr, et al., 2013) is: 3.2%    Values used to calculate the score:      Age: 62 years      Sex: Female      Is Non-  : No      Diabetic: No      Tobacco smoker: No      Systolic Blood Pressure: 116 mmHg      Is BP treated: No      HDL Cholesterol: 49 mg/dL      Total Cholesterol: 194 mg/dL    Viraj Santoyo MD   Psychiatry & Neurology - Neurology

## 2018-02-05 NOTE — LETTER
February 13, 2018    Cecile Mays  [address] [facility] [address]    Dear Cecile,  We are happy to inform you that your PAP smear result from 2/5/18 is normal.  We are now able to do a follow up test on PAP smears. The DNA test is for HPV (Human Papilloma Virus). Cervical cancer is closely linked with certain types of HPV. Your result showed no evidence of high risk HPV.  Therefore we recommend you return in 5 years for your next pap smear and HPV test.  You will still need to return to the clinic every year for an annual exam and other preventive tests.  Please contact the clinic at [phone] with any questions.  Sincerely,    Tommy Pringle MD/alen

## 2018-02-07 LAB
COPATH REPORT: NORMAL
PAP: NORMAL

## 2018-02-09 LAB
FINAL DIAGNOSIS: NORMAL
HPV HR 12 DNA CVX QL NAA+PROBE: NEGATIVE
HPV16 DNA SPEC QL NAA+PROBE: NEGATIVE
HPV18 DNA SPEC QL NAA+PROBE: NEGATIVE
SPECIMEN DESCRIPTION: NORMAL
SPECIMEN SOURCE CVX/VAG CYTO: NORMAL

## 2018-02-20 ENCOUNTER — TRANSFERRED RECORDS (OUTPATIENT)
Dept: HEALTH INFORMATION MANAGEMENT | Facility: CLINIC | Age: 63
End: 2018-02-20

## 2018-04-05 ENCOUNTER — OFFICE VISIT (OUTPATIENT)
Dept: NEUROLOGY | Facility: CLINIC | Age: 63
End: 2018-04-05
Attending: PSYCHIATRY & NEUROLOGY
Payer: COMMERCIAL

## 2018-04-05 VITALS
HEIGHT: 67 IN | WEIGHT: 171 LBS | BODY MASS INDEX: 26.84 KG/M2 | DIASTOLIC BLOOD PRESSURE: 76 MMHG | SYSTOLIC BLOOD PRESSURE: 128 MMHG | HEART RATE: 80 BPM

## 2018-04-05 DIAGNOSIS — G35 MULTIPLE SCLEROSIS (H): ICD-10-CM

## 2018-04-05 DIAGNOSIS — R26.9 GAIT DISTURBANCE: ICD-10-CM

## 2018-04-05 DIAGNOSIS — R41.9 COGNITIVE COMPLAINTS: ICD-10-CM

## 2018-04-05 DIAGNOSIS — N39.46 MIXED STRESS AND URGE URINARY INCONTINENCE: Primary | ICD-10-CM

## 2018-04-05 LAB
ALBUMIN SERPL-MCNC: 3.7 G/DL (ref 3.4–5)
ALP SERPL-CCNC: 141 U/L (ref 40–150)
ALT SERPL W P-5'-P-CCNC: 56 U/L (ref 0–50)
AST SERPL W P-5'-P-CCNC: 29 U/L (ref 0–45)
BASOPHILS # BLD AUTO: 0 10E9/L (ref 0–0.2)
BASOPHILS NFR BLD AUTO: 0.7 %
BILIRUB DIRECT SERPL-MCNC: 0.1 MG/DL (ref 0–0.2)
BILIRUB SERPL-MCNC: 0.4 MG/DL (ref 0.2–1.3)
DEPRECATED CALCIDIOL+CALCIFEROL SERPL-MC: 69 UG/L (ref 20–75)
DIFFERENTIAL METHOD BLD: NORMAL
EOSINOPHIL # BLD AUTO: 0.1 10E9/L (ref 0–0.7)
EOSINOPHIL NFR BLD AUTO: 2 %
ERYTHROCYTE [DISTWIDTH] IN BLOOD BY AUTOMATED COUNT: 11.9 % (ref 10–15)
HCT VFR BLD AUTO: 41.3 % (ref 35–47)
HGB BLD-MCNC: 13.6 G/DL (ref 11.7–15.7)
IMM GRANULOCYTES # BLD: 0 10E9/L (ref 0–0.4)
IMM GRANULOCYTES NFR BLD: 0.2 %
LYMPHOCYTES # BLD AUTO: 1.8 10E9/L (ref 0.8–5.3)
LYMPHOCYTES NFR BLD AUTO: 29.4 %
MCH RBC QN AUTO: 30.3 PG (ref 26.5–33)
MCHC RBC AUTO-ENTMCNC: 32.9 G/DL (ref 31.5–36.5)
MCV RBC AUTO: 92 FL (ref 78–100)
MONOCYTES # BLD AUTO: 0.7 10E9/L (ref 0–1.3)
MONOCYTES NFR BLD AUTO: 11.4 %
NEUTROPHILS # BLD AUTO: 3.4 10E9/L (ref 1.6–8.3)
NEUTROPHILS NFR BLD AUTO: 56.3 %
NRBC # BLD AUTO: 0 10*3/UL
NRBC BLD AUTO-RTO: 0 /100
PLATELET # BLD AUTO: 224 10E9/L (ref 150–450)
PROT SERPL-MCNC: 7.3 G/DL (ref 6.8–8.8)
RBC # BLD AUTO: 4.49 10E12/L (ref 3.8–5.2)
TSH SERPL DL<=0.005 MIU/L-ACNC: 1.6 MU/L (ref 0.4–4)
WBC # BLD AUTO: 6 10E9/L (ref 4–11)

## 2018-04-05 PROCEDURE — 84443 ASSAY THYROID STIM HORMONE: CPT | Performed by: PSYCHIATRY & NEUROLOGY

## 2018-04-05 PROCEDURE — 82306 VITAMIN D 25 HYDROXY: CPT | Performed by: PSYCHIATRY & NEUROLOGY

## 2018-04-05 PROCEDURE — 85025 COMPLETE CBC W/AUTO DIFF WBC: CPT | Performed by: PSYCHIATRY & NEUROLOGY

## 2018-04-05 PROCEDURE — 36415 COLL VENOUS BLD VENIPUNCTURE: CPT | Performed by: PSYCHIATRY & NEUROLOGY

## 2018-04-05 PROCEDURE — G0463 HOSPITAL OUTPT CLINIC VISIT: HCPCS | Mod: ZF

## 2018-04-05 PROCEDURE — 80076 HEPATIC FUNCTION PANEL: CPT | Performed by: PSYCHIATRY & NEUROLOGY

## 2018-04-05 ASSESSMENT — PAIN SCALES - GENERAL: PAINLEVEL: NO PAIN (0)

## 2018-04-05 NOTE — MR AVS SNAPSHOT
After Visit Summary   4/5/2018    Cecile Mays    MRN: [id number]           Patient Information     Date Of Birth          1955        Visit Information        Provider Department      4/5/2018 8:00 AM Viraj Santoyo MD [facility]        Today's Diagnoses     Multiple sclerosis (H)          Care Instructions    1. Blood tests today    2. Return to clinic in six months with MRI scans of the brain and cervical spine prior to the appointment-you can have these scheduled at [facility]          Follow-ups after your visit        Follow-up notes from your care team     Return in about 6 months (around 10/5/2018).      Your next 10 appointments already scheduled     Apr 05, 2018  9:30 AM CDT   LAB with  LAB   [facility] ([facility])    [address] [place] [id number] [phone]           Please do not eat 10-12 hours before your appointment if you are coming in fasting for labs on lipids, cholesterol, or glucose (sugar). This does not apply to pregnant women. Water, hot tea and black coffee (with nothing added) are okay. Do not drink other fluids, diet soda or chew gum.            Oct 04, 2018  9:00 AM CDT   (Arrive by 8:45 AM)   Return Multiple Sclerosis with Viraj Santoyo MD   [facility] ([facility])    [address] [place] [address] [phone]              Future tests that were ordered for you today     Open Future Orders        Priority Expected Expires Ordered    MRI Brain w & w/o contrast Routine 10/4/2018 4/5/2019 4/5/2018    MRI Cervical spine w & w/o contrast Routine 10/4/2018 4/5/2019 4/5/2018    CBC with platelets differential Routine 4/5/2018 6/30/2018 4/5/2018    Hepatic panel Routine 4/5/2018 6/30/2018 4/5/2018    TSH with free T4 reflex Routine 4/5/2018 6/30/2018 4/5/2018    Vitamin D Deficiency Screening  "Routine 4/5/2018 6/30/2018 4/5/2018            Who to contact     If you have questions or need follow up information about today's clinic visit or your schedule please contact [facility] directly at [phone].  Normal or non-critical lab and imaging results will be communicated to you by Broccol-e-gameshart, letter or phone within 4 business days after the clinic has received the results. If you do not hear from us within 7 days, please contact the clinic through Broccol-e-gameshart or phone. If you have a critical or abnormal lab result, we will notify you by phone as soon as possible.  Submit refill requests through [employer] or call your pharmacy and they will forward the refill request to us. Please allow 3 business days for your refill to be completed.          Additional Information About Your Visit        [email or web address] Information     [employer] gives you secure access to your electronic health record. If you see a primary care provider, you can also send messages to your care team and make appointments. If you have questions, please call your primary care clinic.  If you do not have a primary care provider, please call [phone] and they will assist you.        Care EveryWhere ID     This is your Care EveryWhere ID. This could be used by other organizations to access your [place] medical records  [id number]        Your Vitals Were     Pulse Height Last Period BMI (Body Mass Index)          80 1.689 m (5' 6.5\") 02/05/2008 27.19 kg/m2         Blood Pressure from Last 3 Encounters:   04/05/18 128/76   02/05/18 116/64   01/08/18 136/66    Weight from Last 3 Encounters:   04/05/18 77.6 kg (171 lb)   02/05/18 78.9 kg (174 lb)   01/08/18 79.4 kg (175 lb)               Primary Care Provider Office Phone # Fax #    Tommy Pringle [phone]       [address] [place] [address]        Equal Access to Services     [facility]: Hadii jarod banuelos hadasho Soomaali, waaxda luqadaha, qaybta kaalmada " mini hensongiovanni santamariaaan ah. So [facility] [phone].    ATENCIÓN: Si filipela marli, tiene a godinez disposición servicios gratuitos de asistencia lingüística. Allison al [phone].    We comply with applicable federal civil rights laws and [place] laws. We do not discriminate on the basis of race, color, national origin, age, disability, sex, sexual orientation, or gender identity.            Thank you!     Thank you for choosing [facility]  for your care. Our goal is always to provide you with excellent care. Hearing back from our patients is one way we can continue to improve our services. Please take a few minutes to complete the written survey that you may receive in the mail after your visit with us. Thank you!             Your Updated Medication List - Protect others around you: Learn how to safely use, store and throw away your medicines at www.disposemymeds.org.          This list is accurate as of 4/5/18  9:28 AM.  Always use your most recent med list.                   Brand Name Dispense Instructions for use Diagnosis    albuterol 108 (90 BASE) MCG/ACT Inhaler    PROAIR HFA/PROVENTIL HFA/VENTOLIN HFA    1 Inhaler    Inhale 2 puffs into the lungs every 6 hours as needed for shortness of breath / dyspnea or wheezing    Acute bronchospasm       aspirin 81 MG tablet     90 tablet    Take 1 tablet (81 mg) by mouth daily    Routine general medical examination at a health care facility       B-12 1000 MCG Tbcr     100 tablet    Take 1,000 mcg by mouth daily        CALCIUM 600/VITAMIN D3 600-800 MG-UNIT Tabs   Generic drug:  Calcium Carb-Cholecalciferol      daily        glucosamine msm complex Tabs tablet     100 tablet    Take 1 tablet by mouth daily        Interferon Beta-1a 44 MCG/0.5ML Soaj      Taking Mon Weds and Friday        Krill Oil Omega-3 500 MG Caps      2 daily        sertraline 50 MG tablet    ZOLOFT    135 tablet    Take 1.5 tablets a day    Adjustment  disorder with anxious mood       vitamin D 2000 UNITS tablet     100 tablet    3,000 units daily    Major depressive disorder, recurrent episode, mild (H)       WOMENS DAILY MULTIVITAMIN Tabs     30    1 TABLET DAILY

## 2018-04-05 NOTE — LETTER
4/5/2018    RE: Cecile Mays  [address]  [facility] [address]      Referral source: Established patient     Chief complaint: Multiple sclerosis     History of the Present Illness: Ms. Cecile Mays is a 62-year-old woman who returns to the [facility] today for follow-up appointment scheduled at her request.  This is a patient whom I saw on one previous occasion in December 2016.  She presents today for the purpose of transferring her care to our center.      As per my previous note, the patient initially came to attention in April 2016 after she had fairly abrupt onset of a numb sensation on the left side of the face.  An MRI scan of the brain demonstrated white matter lesions suspicious for demyelinating disease and she was found to have positive oligoclonal bands in the CSF.  At the time that I saw the patient, she had been started on glatiramer acetate and there was some concern that an MRI had demonstrated some accumulation of new lesions a few months after starting that medication.  In the interim since I last saw her, however, MRI imaging apparently demonstrated ongoing accumulation of new lesions and she was transitioned to the Rebif formulation of interferon beta approximately 1 year ago.      The patient relates that the Rebif injections are overall going well.  She relates that sometimes when she wakes up on the evening of an injection she will have some myalgias, but these respond to ibuprofen and are not particularly bothersome to her.      She denies any recent episodic changes in vision, balance, strength or sensation suggestive of new relapse of multiple sclerosis.  She does feel that there has been some subtle deterioration in her gait over the last 18 months or so.  This is particularly noticeable when she is fatigued.  She relates, for example, that whereas in the past she would take steps 2 or 3 at a time, now she feels that she should be more cautious on  "steps and there is an occasional need for a balance correction.  She also notes some subtle weakness in the left leg, which again is more noticeable with fatigue.      She also notes urinary symptoms with occasional leakage of small volumes of urine.  This appears to have both an urgency and stress component per her description.     She endorses \"foggy brain\", which worsens with fatigue.       Past Medical History:  1.  Depression.   2.  Hyperlipidemia.   3.  Allergic rhinitis.   4.  Gastroesophageal reflux disease.      PHYSICAL EXAMINATION:   VITAL SIGNS:  Blood pressure 128/76; pulse 80; weight 77.6 kg; height 1.69 meters.   GENERAL:  Well-nourished woman who appears her stated age of 62 years, presents to the examination accompanied by her , awake and alert and in no acute distress.   NEUROLOGIC:   MENTAL STATUS:  Alert and oriented times four.   CRANIAL NERVES:  Visual fields are full to confrontation bilaterally.  Extraocular movements are intact with no internuclear ophthalmoplegia.  Facial strength is normal.  There is subjectively decreased sensation predominantly in the V2 distribution on the left side of the face, as before.  Hearing is normal for conversational purposes.  Palate elevation and tongue protrusion are normal.   POWER:  Strength is normal (5/5) in the following muscles/groups bilaterally:  Deltoids, biceps, triceps, wrist extensors, finger extensors, first dorsal interosseous, hamstrings and anterior tibialis.  The hip flexors grade 4+ on the left and are normal on the right.   REFLEXES:  Reflexes are present and symmetric at biceps, triceps and brachioradialis.  The left knee and ankle reflexes are increased compared to the right.   MOTOR/CEREBELLAR:  There are no tremors, myoclonus or other abnormal movements.  Tone is grossly normal in the limbs.  There is no appendicular ataxia on finger-to-nose testing and rapid alternating movements are normal in the extremities.  There is no " pronator drift in the arms.   GAIT:  The patient is able to ambulate on a flat, level surface without difficulty.  She can walk on heels and toes.  Tandem gait is mildly to moderately impaired for age.      Assessment/plan:    1.  Multiple sclerosis  The patient has been clinically stable as regards any evidence of MS relapse since beginning disease-modifying therapy with the Rebif formulation of beta interferon about a year ago.  An MRI performed last October was also reportedly stable.  I discussed with the patient that we would be planning on repeating this imaging in approximately 6 months to ensure she is maintaining radiologic stability of her condition.  We will also check routine laboratory monitoring studies today to include blood counts, liver function tests, TSH, and vitamin D level.  At present, she believes that she is taking 2000 units of vitamin D daily.     She is tolerating her current disease-modifying therapy with Rebif well, and we will continue this barring any unexpected abnormalities on her laboratory testing.     We discussed the prognosis of multiple sclerosis in her case.  Although we do not have a compelling description of previous relapses prior to 2016, the patient does suspect in retrospect that she may have had this condition for much longer, and that certainly may be the case.  Her description of insidiously progressive difficulties with balance and mild leg weakness could be suggestive of an element of motor progression.  I discussed with her that this is typically an insidious and very slow process.  Currently, she is able to ambulate without restriction for short distances and I told her that from a standpoint of multiple sclerosis I certainly would not expect her to progress to a point that she would need wheeled mobility at any time in the next 5-10 years.        2.  Gait disturbance  We discussed the importance of precautions including utilizing rails on all stairways when  available.  At present, she does not need any gait aid for mobility.      3.  Mixed urinary incontinence   I differentiated stress incontinence due to weakness of the pelvic floor muscles from urge associated incontinence that may relate to involvement of nervous system pathways to the bladder.  The former is typically treated with conservative management including Kegel exercises, whereas there are medications that inhibit bladder contraction and can be helpful in urge associated incontinence.  At present, she regards the problem as more of a nuisance and does not feel that she needs medication for this problem specifically.      4.  Cognitive concerns  I discussed with the patient that cognitive changes that differ from age-matched norms are fairly common in multiple sclerosis, and can be detected in more than 50% of patients on neuropsychological evaluation.  The most common difficulties include problems with word finding as well as multitasking.  Medications such as cholinesterase inhibitors have been tested in patients with multiple sclerosis and have not been found to be of benefit overall.  Stimulant medications can occasionally be used, particularly in patients who are having difficulty functioning in high demand occupational settings, and are probably more helpful for promoting attention than anything else.  She relates that she is in the process of retiring from her job.    At present I do not think that medications have a clear role for her cognitive concerns.  I reassured her that even in patients with very advanced MS it is quite unusual for cognitive problems to progress to an extent that people are not able to manage their own affairs from that perspective.  We will keep an eye on these symptoms over time.     I spent 48 minutes with the patient in the office today, with greater than 50% of this time spent in counseling. I answered her several questions.    Viraj Santoyo MD  Assistant  Professor of Neurology  [facility]    Cc:  Tommy Pringle MD (PCP)  Patient

## 2018-04-05 NOTE — PROGRESS NOTES
Date of service: April 5, 2018     Referral source: Established patient     Chief complaint: Multiple sclerosis     History of the Present Illness: Ms. Cecile Mays is a 62-year-old woman who returns to the [facility]clerosis Clinic today for follow-up appointment scheduled at her request.  This is a patient whom I saw on one previous occasion in December 2016.  She presents today for the purpose of transferring her care to our center.      As per my previous note, the patient initially came to attention in April 2016 after she had fairly abrupt onset of a numb sensation on the left side of the face.  An MRI scan of the brain demonstrated white matter lesions suspicious for demyelinating disease and she was found to have positive oligoclonal bands in the CSF.  At the time that I saw the patient, she had been started on glatiramer acetate and there was some concern that an MRI had demonstrated some accumulation of new lesions a few months after starting that medication.  In the interim since I last saw her, however, MRI imaging apparently demonstrated ongoing accumulation of new lesions and she was transitioned to the Rebif formulation of interferon beta approximately 1 year ago.      The patient relates that the Rebif injections are overall going well.  She relates that sometimes when she wakes up on the evening of an injection she will have some myalgias, but these respond to ibuprofen and are not particularly bothersome to her.      She denies any recent episodic changes in vision, balance, strength or sensation suggestive of new relapse of multiple sclerosis.  She does feel that there has been some subtle deterioration in her gait over the last 18 months or so.  This is particularly noticeable when she is fatigued.  She relates, for example, that whereas in the past she would take steps 2 or 3 at a time, now she feels that she should be more cautious on steps and there is an occasional need for a  "balance correction.  She also notes some subtle weakness in the left leg, which again is more noticeable with fatigue.      She also notes urinary symptoms with occasional leakage of small volumes of urine.  This appears to have both an urgency and stress component per her description.     She endorses \"foggy brain\", which worsens with fatigue.     Past Medical History:  1.  Depression.   2.  Hyperlipidemia.   3.  Allergic rhinitis.   4.  Gastroesophageal reflux disease.      PHYSICAL EXAMINATION:   VITAL SIGNS:  Blood pressure 128/76; pulse 80; weight 77.6 kg; height 1.69 meters.   GENERAL:  Well-nourished woman who appears her stated age of 62 years, presents to the examination accompanied by her , awake and alert and in no acute distress.   NEUROLOGIC:   MENTAL STATUS:  Alert and oriented times four.   CRANIAL NERVES:  Visual fields are full to confrontation bilaterally.  Extraocular movements are intact with no internuclear ophthalmoplegia.  Facial strength is normal.  There is subjectively decreased sensation predominantly in the V2 distribution on the left side of the face, as before.  Hearing is normal for conversational purposes.  Palate elevation and tongue protrusion are normal.   POWER:  Strength is normal (5/5) in the following muscles/groups bilaterally:  Deltoids, biceps, triceps, wrist extensors, finger extensors, first dorsal interosseous, hamstrings and anterior tibialis.  The hip flexors grade 4+ on the left and are normal on the right.   REFLEXES:  Reflexes are present and symmetric at biceps, triceps and brachioradialis.  The left knee and ankle reflexes are increased compared to the right.   MOTOR/CEREBELLAR:  There are no tremors, myoclonus or other abnormal movements.  Tone is grossly normal in the limbs.  There is no appendicular ataxia on finger-to-nose testing and rapid alternating movements are normal in the extremities.  There is no pronator drift in the arms.   GAIT:  The patient " is able to ambulate on a flat, level surface without difficulty.  She can walk on heels and toes.  Tandem gait is mildly to moderately impaired for age.      Assessment/plan:    1.  Multiple sclerosis  The patient has been clinically stable as regards any evidence of MS relapse since beginning disease-modifying therapy with the Rebif formulation of beta interferon about a year ago.  An MRI performed last October was also reportedly stable.  I discussed with the patient that we would be planning on repeating this imaging in approximately 6 months to ensure she is maintaining radiologic stability of her condition.  We will also check routine laboratory monitoring studies today to include blood counts, liver function tests, TSH, and vitamin D level.  At present, she believes that she is taking 2000 units of vitamin D daily.     She is tolerating her current disease-modifying therapy with Rebif well, and we will continue this barring any unexpected abnormalities on her laboratory testing.     We discussed the prognosis of multiple sclerosis in her case.  Although we do not have a compelling description of previous relapses prior to 2016, the patient does suspect in retrospect that she may have had this condition for much longer, and that certainly may be the case.  Her description of insidiously progressive difficulties with balance and mild leg weakness could be suggestive of an element of motor progression.  I discussed with her that this is typically an insidious and very slow process.  Currently, she is able to ambulate without restriction for short distances and I told her that from a standpoint of multiple sclerosis I certainly would not expect her to progress to a point that she would need wheeled mobility at any time in the next 5-10 years.      2.  Gait disturbance  We discussed the importance of precautions including utilizing rails on all stairways when available.  At present, she does not need any gait aid  for mobility.      3.  Mixed urinary incontinence   I differentiated stress incontinence due to weakness of the pelvic floor muscles from urge associated incontinence that may relate to involvement of nervous system pathways to the bladder.  The former is typically treated with conservative management including Kegel exercises, whereas there are medications that inhibit bladder contraction and can be helpful in urge associated incontinence.  At present, she regards the problem as more of a nuisance and does not feel that she needs medication for this problem specifically.      4.  Cognitive concerns  I discussed with the patient that cognitive changes that differ from age-matched norms are fairly common in multiple sclerosis, and can be detected in more than 50% of patients on neuropsychological evaluation.  The most common difficulties include problems with word finding as well as multitasking.  Medications such as cholinesterase inhibitors have been tested in patients with multiple sclerosis and have not been found to be of benefit overall.  Stimulant medications can occasionally be used, particularly in patients who are having difficulty functioning in high demand occupational settings, and are probably more helpful for promoting attention than anything else.  She relates that she is in the process of retiring from her job.    At present I do not think that medications have a clear role for her cognitive concerns.  I reassured her that even in patients with very advanced MS it is quite unusual for cognitive problems to progress to an extent that people are not able to manage their own affairs from that perspective.  We will keep an eye on these symptoms over time.     I spent 48 minutes with the patient in the office today, with greater than 50% of this time spent in counseling. I answered her several questions.     MD CHARITY Chambers MD             D: 04/05/2018   T: 04/05/2018    MT: AKA      Name:     LETY CHOWDHURY   MRN:      [id number]        Account:      [id number]   :      1955           Service Date: 2018      Document: [id number]

## 2018-04-05 NOTE — PATIENT INSTRUCTIONS
1. Blood tests today    2. Return to clinic in six months with MRI scans of the brain and cervical spine prior to the appointment-you can have these scheduled at [facility]

## 2018-04-05 NOTE — NURSING NOTE
"Chief Complaint   Patient presents with     RECHECK     [facility]- Follow up MS       Initial /76 (BP Location: Right arm, Patient Position: Chair, Cuff Size: Adult Regular)  Pulse 80  Ht 1.689 m (5' 6.5\")  Wt 77.6 kg (171 lb)  LMP 02/05/2008  BMI 27.19 kg/m2 Estimated body mass index is 27.19 kg/(m^2) as calculated from the following:    Height as of this encounter: 1.689 m (5' 6.5\").    Weight as of this encounter: 77.6 kg (171 lb).  Medication Reconciliation: complete   Niru Kent MA      "

## 2018-04-06 ENCOUNTER — MYC MEDICAL ADVICE (OUTPATIENT)
Dept: NEUROLOGY | Facility: CLINIC | Age: 63
End: 2018-04-06

## 2018-09-24 ENCOUNTER — HOSPITAL ENCOUNTER (OUTPATIENT)
Dept: MRI IMAGING | Facility: CLINIC | Age: 63
Discharge: HOME | End: 2018-09-24
Attending: PSYCHIATRY & NEUROLOGY | Admitting: PSYCHIATRY & NEUROLOGY
Payer: COMMERCIAL

## 2018-09-24 ENCOUNTER — HOSPITAL ENCOUNTER (OUTPATIENT)
Dept: MRI IMAGING | Facility: CLINIC | Age: 63
End: 2018-09-24
Attending: PSYCHIATRY & NEUROLOGY
Payer: COMMERCIAL

## 2018-09-24 DIAGNOSIS — G35 MULTIPLE SCLEROSIS (H): ICD-10-CM

## 2018-09-24 PROCEDURE — A9585 GADOBUTROL INJECTION: HCPCS | Performed by: RADIOLOGY

## 2018-09-24 PROCEDURE — 72156 MRI NECK SPINE W/O & W/DYE: CPT

## 2018-09-24 PROCEDURE — 25500064 ZZH RX 255 OP 636: Performed by: RADIOLOGY

## 2018-09-24 PROCEDURE — 70553 MRI BRAIN STEM W/O & W/DYE: CPT

## 2018-09-24 RX ORDER — GADOBUTROL 604.72 MG/ML
7.5 INJECTION INTRAVENOUS ONCE
Status: COMPLETED | OUTPATIENT
Start: 2018-09-24 | End: 2018-09-24

## 2018-09-24 RX ADMIN — GADOBUTROL 7.5 ML: 604.72 INJECTION INTRAVENOUS at 08:50

## 2018-10-04 ENCOUNTER — OFFICE VISIT (OUTPATIENT)
Dept: NEUROLOGY | Facility: CLINIC | Age: 63
End: 2018-10-04
Attending: PSYCHIATRY & NEUROLOGY
Payer: COMMERCIAL

## 2018-10-04 VITALS
SYSTOLIC BLOOD PRESSURE: 138 MMHG | BODY MASS INDEX: 27.37 KG/M2 | HEIGHT: 67 IN | WEIGHT: 174.4 LBS | HEART RATE: 80 BPM | DIASTOLIC BLOOD PRESSURE: 78 MMHG

## 2018-10-04 DIAGNOSIS — G35 MS (MULTIPLE SCLEROSIS) (H): Primary | ICD-10-CM

## 2018-10-04 DIAGNOSIS — G35 MS (MULTIPLE SCLEROSIS) (H): ICD-10-CM

## 2018-10-04 DIAGNOSIS — R39.15 URINARY URGENCY: ICD-10-CM

## 2018-10-04 DIAGNOSIS — H53.9 VISUAL DISTURBANCE: ICD-10-CM

## 2018-10-04 LAB
ALBUMIN SERPL-MCNC: 3.5 G/DL (ref 3.4–5)
ALP SERPL-CCNC: 142 U/L (ref 40–150)
ALT SERPL W P-5'-P-CCNC: 64 U/L (ref 0–50)
AST SERPL W P-5'-P-CCNC: 35 U/L (ref 0–45)
BASOPHILS # BLD AUTO: 0 10E9/L (ref 0–0.2)
BASOPHILS NFR BLD AUTO: 0.6 %
BILIRUB DIRECT SERPL-MCNC: <0.1 MG/DL (ref 0–0.2)
BILIRUB SERPL-MCNC: 0.3 MG/DL (ref 0.2–1.3)
DIFFERENTIAL METHOD BLD: NORMAL
EOSINOPHIL # BLD AUTO: 0.1 10E9/L (ref 0–0.7)
EOSINOPHIL NFR BLD AUTO: 1.4 %
ERYTHROCYTE [DISTWIDTH] IN BLOOD BY AUTOMATED COUNT: 12.7 % (ref 10–15)
HCT VFR BLD AUTO: 42.4 % (ref 35–47)
HGB BLD-MCNC: 13.7 G/DL (ref 11.7–15.7)
IMM GRANULOCYTES # BLD: 0 10E9/L (ref 0–0.4)
IMM GRANULOCYTES NFR BLD: 0.4 %
LYMPHOCYTES # BLD AUTO: 1.4 10E9/L (ref 0.8–5.3)
LYMPHOCYTES NFR BLD AUTO: 27.1 %
MCH RBC QN AUTO: 29.6 PG (ref 26.5–33)
MCHC RBC AUTO-ENTMCNC: 32.3 G/DL (ref 31.5–36.5)
MCV RBC AUTO: 92 FL (ref 78–100)
MONOCYTES # BLD AUTO: 0.5 10E9/L (ref 0–1.3)
MONOCYTES NFR BLD AUTO: 10.6 %
NEUTROPHILS # BLD AUTO: 3.1 10E9/L (ref 1.6–8.3)
NEUTROPHILS NFR BLD AUTO: 59.9 %
NRBC # BLD AUTO: 0 10*3/UL
NRBC BLD AUTO-RTO: 0 /100
PLATELET # BLD AUTO: 218 10E9/L (ref 150–450)
PROT SERPL-MCNC: 7.4 G/DL (ref 6.8–8.8)
RBC # BLD AUTO: 4.63 10E12/L (ref 3.8–5.2)
WBC # BLD AUTO: 5.1 10E9/L (ref 4–11)

## 2018-10-04 PROCEDURE — G0463 HOSPITAL OUTPT CLINIC VISIT: HCPCS | Mod: ZF

## 2018-10-04 PROCEDURE — 36415 COLL VENOUS BLD VENIPUNCTURE: CPT | Performed by: PSYCHIATRY & NEUROLOGY

## 2018-10-04 PROCEDURE — 85025 COMPLETE CBC W/AUTO DIFF WBC: CPT | Performed by: PSYCHIATRY & NEUROLOGY

## 2018-10-04 PROCEDURE — 80076 HEPATIC FUNCTION PANEL: CPT | Performed by: PSYCHIATRY & NEUROLOGY

## 2018-10-04 RX ORDER — OXYBUTYNIN CHLORIDE 5 MG/1
TABLET ORAL
Qty: 60 TABLET | Refills: 5 | Status: SHIPPED | OUTPATIENT
Start: 2018-10-04 | End: 2019-03-28

## 2018-10-04 ASSESSMENT — PAIN SCALES - GENERAL: PAINLEVEL: NO PAIN (0)

## 2018-10-04 NOTE — PATIENT INSTRUCTIONS
1. Continue Rebif    2. Suggest trying one to two naproxen (Aleve) 220 mg about 30 minutes prior to Rebif injections    3. We will also try oxybutynin 5 mg twice daily for urinary urgency: prescription has been sent to your pharmacy    4. Blood tests today    5. Return to Xena Koenig CNP in 6 months

## 2018-10-04 NOTE — PROGRESS NOTES
"Date of service: October 4, 2018     Referral source: Established patient     Chief complaint: Multiple sclerosis    Assisted by: Oskar Dodd MD, PGY-2 Neurology resident      History of the Present Illness:  Ms. Ceclie Mays is a 62-year-old woman who returns to the [facility]clerosis Clinic today for scheduled follow-up after earlier MRI scans of the brain and cervical spine.  I was assisted in this evaluation today by Dr. Dodd, and his documentation should be considered integral to this note.      The patient initially developed symptoms of demyelinating disease in 2016 when she developed left facial numbness.  MRI and CSF studies at that time were suggestive of demyelinating disease of the type seen in multiple sclerosis.  Formal MS diagnosis was confirmed by later radiologic accumulation of typical demyelinating lesions on MRI.  She was initially treated with glatiramer acetate, but due to radiologic progression on the medication, she has been switched to the Rebif formulation of interferon beta.      Today, the patient denies any new episodic changes in vision, balance, strength or sensation suggestive of new relapse of multiple sclerosis since she was last seen in this clinic.      We discussed several symptomatic issues today.  Previous concerns about gait and cognitive issues appear to be relatively stable.  She denies falls and is able to ascend and descend stairs without much difficulty.  She notes intermittent \"foggy brain\" but has not had any more concerning cognitive issues such as disorientation and is able to manage her household finances in conjunction with her .      She is noting ongoing intermittent urinary urgency which varies from day to day and does get up to go to the bathroom a couple of times at night.      She notes some difficulty with vision that she describes as her \"eyes not working together.\"  Very intermittently she has noted some true double vision with " extremes of temperature, but this is unusual.  However, she does note that it is clearly helpful when she closes one eye or the other.  She has stopped doing crossword puzzles in the newspaper and does not read as much as she did in the past because she feels that this causes her to get headaches.  She was seen by an optometrist earlier this year, who did optical coherence tomography studies reportedly demonstrating bilateral retinal nerve fiber layer thinning.      I reviewed MRI scans of the brain and cervical spine performed on 09/24/2018.  MRI scan of the brain continues to demonstrate several periventricular and juxtacortical foci of T2 hyperintensity in a distribution suggestive of demyelinating disease of the type seen in multiple sclerosis.  These are unchanged in comparison to earlier MRI of 10/25/2017.  Contrast enhanced sequences are remarkable for small left frontal developmental venous anomaly, but there is no other abnormal enhancement with contrast.  There is a low burden of T1 hypointense foci noted.  MRI scan of the cervical spine demonstrates anterior T2 hyperintense lesion in the upper cervical cord at C2.  This was not well visualized on previous MRI of 10/25/2017, but was clearly present on MRI of 05/15/2017, establishing the chronic nature of this lesion.  There are no other new lesions and no abnormal enhancement with gadolinium contrast.      Past Medical History:  1.  Depression.   2.  Hyperlipidemia.   3.  Allergic rhinitis.   4.  Gastroesophageal reflux disease.     Physical examination is as per Dr. Dodd with pertinent findings including decreased sensation in the left side of the face as well as mild weakness of the left hip flexors.      Assessment/plan:    1.  Multiple sclerosis  The patient is clinically and radiologically stable on disease-modifying therapy with the Rebif formulation of interferon beta.  I am going to check routine laboratory studies today including blood  counts and liver function tests.  I will have her return for a review with our nurse practitioner, Xena Koenig CNP in 6 months.     2.  Urinary urgency    I am going to attempt a trial of oxybutynin beginning at 5 mg twice daily to see if this is beneficial for reducing her urinary urgency.     3.  Visual complaints  Although the description of her visual complaint is somewhat indistinct, the resolution of these with closing one eye strongly suggest a subtle difficulty with conjugate eye movements.  I do suspect that this is secondary to multiple sclerosis.  I discussed with the patient that we could obtain a neuro-ophthalmologic opinion at any time for an opinion on therapy as well as to obtain a baseline examination, although it may be that her problem is mild enough that intervention such as prisms are not going to be of benefit to her.  She would like to hold off on this for now, but I told her that we can always pursue this in the future if the problem is becoming worse.         CHARITY NICOLE MD             D: 10/04/2018   T: 10/04/2018   MT: ORIN      Name:     LETY CHOWDHURY   MRN:      [id number]        Account:      [id number]   :      1955           Service Date: 10/04/2018      Document: [id number]

## 2018-10-04 NOTE — LETTER
"10/4/2018      RE: Cecile Mays  [address] [facility] [address]     Date of service: October 4, 2018     Referral source: Established patient     Chief complaint: Multiple sclerosis    Assisted by: Oskar Dodd MD, PGY-2 Neurology resident      History of the Present Illness:  Ms. Cecile Mays is a 62-year-old woman who returns to the Multiple Sclerosis Clinic today for scheduled follow-up after earlier MRI scans of the brain and cervical spine.  I was assisted in this evaluation today by Dr. Dodd, and his documentation should be considered integral to this note.      The patient initially developed symptoms of demyelinating disease in 2016 when she developed left facial numbness.  MRI and CSF studies at that time were suggestive of demyelinating disease of the type seen in multiple sclerosis.  Formal MS diagnosis was confirmed by later radiologic accumulation of typical demyelinating lesions on MRI.  She was initially treated with glatiramer acetate, but due to radiologic progression on the medication, she has been switched to the Rebif formulation of interferon beta.      Today, the patient denies any new episodic changes in vision, balance, strength or sensation suggestive of new relapse of multiple sclerosis since she was last seen in this clinic.      We discussed several symptomatic issues today.  Previous concerns about gait and cognitive issues appear to be relatively stable.  She denies falls and is able to ascend and descend stairs without much difficulty.  She notes intermittent \"foggy brain\" but has not had any more concerning cognitive issues such as disorientation and is able to manage her household finances in conjunction with her .      She is noting ongoing intermittent urinary urgency which varies from day to day and does get up to go to the bathroom a couple of times at night.      She notes some difficulty with vision that she describes as her \"eyes " "not working together.\"  Very intermittently she has noted some true double vision with extremes of temperature, but this is unusual.  However, she does note that it is clearly helpful when she closes one eye or the other.  She has stopped doing crossword puzzles in the newspaper and does not read as much as she did in the past because she feels that this causes her to get headaches.  She was seen by an optometrist earlier this year, who did optical coherence tomography studies reportedly demonstrating bilateral retinal nerve fiber layer thinning.      I reviewed MRI scans of the brain and cervical spine performed on 09/24/2018.  MRI scan of the brain continues to demonstrate several periventricular and juxtacortical foci of T2 hyperintensity in a distribution suggestive of demyelinating disease of the type seen in multiple sclerosis.  These are unchanged in comparison to earlier MRI of 10/25/2017.  Contrast enhanced sequences are remarkable for small left frontal developmental venous anomaly, but there is no other abnormal enhancement with contrast.  There is a low burden of T1 hypointense foci noted.  MRI scan of the cervical spine demonstrates anterior T2 hyperintense lesion in the upper cervical cord at C2.  This was not well visualized on previous MRI of 10/25/2017, but was clearly present on MRI of 05/15/2017, establishing the chronic nature of this lesion.  There are no other new lesions and no abnormal enhancement with gadolinium contrast.      Past Medical History:  1.  Depression.   2.  Hyperlipidemia.   3.  Allergic rhinitis.   4.  Gastroesophageal reflux disease.     Physical examination is as per Dr. Dodd with pertinent findings including decreased sensation in the left side of the face as well as mild weakness of the left hip flexors.      Assessment/plan:    1.  Multiple sclerosis  The patient is clinically and radiologically stable on disease-modifying therapy with the Rebif formulation of " interferon beta.  I am going to check routine laboratory studies today including blood counts and liver function tests.  I will have her return for a review with our nurse practitioner, Xena Koenig CNP in 6 months.     2.  Urinary urgency    I am going to attempt a trial of oxybutynin beginning at 5 mg twice daily to see if this is beneficial for reducing her urinary urgency.     3.  Visual complaints  Although the description of her visual complaint is somewhat indistinct, the resolution of these with closing one eye strongly suggest a subtle difficulty with conjugate eye movements.  I do suspect that this is secondary to multiple sclerosis.  I discussed with the patient that we could obtain a neuro-ophthalmologic opinion at any time for an opinion on therapy as well as to obtain a baseline examination, although it may be that her problem is mild enough that intervention such as prisms are not going to be of benefit to her.  She would like to hold off on this for now, but I told her that we can always pursue this in the future if the problem is becoming worse.      Viraj Santoyo MD   of Neurology  [facility]    Cc:  Tommy Pringle MD (PCP)  Patient

## 2018-10-04 NOTE — PROGRESS NOTES
"Date of Service: 10/04/18      Referral Source: Return Patient      History of Present Illness:   Ms Mays is a 61 yo woman who first presented with Left sided facial numbness in 2016 and was found to have lesions consistent with MS on brain MRI and oligoclonal bands in CSF. She was started on Glatiramer though follow-up MRI showed new lesions so she was started on interferon therapy in the Spring of 2017. During her last appointment in 04/2018 she noted gait disturbance, mixed urinary incontinence, and cognitive slowing.     In the interim she notes stable symptoms, no new symptoms, though does feel that her vision has been getting progressively worse. Her gait is still a bit unsteady, though she has had no falls and continues to be able to climb stairs and ambulate without significant impairment. She describes that she used to leap up stairs 2-3 at a time and twyla children around the house, though she can no longer do this. Her left side of the face remains numb \"as if injected with novocaine,\" and she continues to have urinary urgency. The urgency occurs most days and is sometimes accompanied by minor urinary leakage on her way to the washroom. She urinates about 2 times during the night, though denies that this is the reason for awakening at night (her  wakes her). She describes her cognitive dysfunction as \"brain fog,\" marked by difficulty coming up with responses to questions, as if she has to think through some barriers. Otherwise she is able to participate in household financial management as per usual and denies difficulty getting lost while driving. Her  denies that she repeats herself or otherwise seems impaired from a cognitive standpoint.     Her visual changes are described as \"left and right eye fighting with one another.\" She denies that this is blurred vision and in fact had an optometry appointment that did not result in a change in her prescription. Leftward gaze seems to " require more work/effort. She has had few occasions of double vision that resolve when she closes one eye. An OCT from 04/2018 showed thinning bilaterally and a right central visual field deficit.     Ms Mays is tolerating interferon injections well, M, W, F though she does describe myalgias and arthralgias each Monday night that she associates with the injection.         Physical Examination:  General: NAD, presents to visit with her , ambulates independently without assistive device    Neurological Examination  Mental status: awake, alert, attentive, oriented to self, time, place, and circumstance. Language fluent. Recent and remote memory intact.  CN: VFF, PERRL, EOMI, face symmetric, tongue and uvula midline, shoulder shrug intact. Decreased sensation to light touch Left V2, V3 distribution. Extra effort with Left lateral gaze, otherwise EOMI appear normal.   Motor: No pronator drift, 5/5 strength with arm abduction, elbow flexion/extension, finger abduction. 4+ Left hand extension.  4+ Left hip flexion, otherwise 5/5 strength with knee extension/flexion, dorsiflexion.    Sensory: intact to light touch in 4/4 extremities   Reflexes: 2+ and symmetric in bilateral biceps, brachioradialis, patellae and achilles. No clonus, toes downgoing.  Coordination and gait: FNF and HS without ataxia or dysmetria. Gait is normal and symmetrical, able to walk on toes and heels. Difficulty balancing with tandem gait.     Assessment/ Plan    1. Multiple sclerosis  There appears to be no new lesions on MRI and symptoms have remained quite stable over time. There is no indication to change her current therapy. We discussed how long she may be on interferon, or any other DMT for that matter, including that the disease may burn itself out as time goes on. 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 No nystagmus or disconjugate gaze is  visible on examination, but it still may be symptomatic. She was offered referral to neuro opthalmology though she declined now and prefers to monitor for progression via the MS clinic alone.    - Continue interferon beta injections 3 times weekly  - Recommend oral NSAID therapy prior to injection to reduce associated joint and muscle pain  - RTC 6 months with Nurse practitioner  - Repeat MRI Brain and C spine w and w/o contrast in 12 months  - Routine blood work today  - Oxybutinin 5mg BID    2. Visual deficit  Described above. Will consider possible ITZ that is symptomatic without objective findings on exam. If worsens will referral to neuro-optho.     3. Mixed urinary incontinence  Described above. Pt describes being minimally bothered by urgency and occasional leakage, though is willing to try oxybutinin for the next 10-14 days and will assess for improvement.     4. Cognitive concerns  Has not worsened since last visit and pt still able to fully participate in household activities including paying bills. Patient's family does not note any functional decline. This is most pronounced when fatigued.     5. Gait disturbance  Stable and unchanged. No falls. Left leg is subjectively weaker and this is appreciated on formal strength testing with a 4+ with hip flexion. Otherwise 5/5 strength.         I spent 30 minutes with the patient in the office today, with greater than 50% of this time spent in counseling.     Oskar Dodd MD PGY-2    Patient was seen and discussed with Dr. Santoyo     Attending physician: I saw and evaluated the patient with the resident and I agree with his findings and plan of care as documented above. Please see my note for additional details.    LARON Santoyo MD

## 2018-10-04 NOTE — MR AVS SNAPSHOT
After Visit Summary   10/4/2018    Cecile Mays    MRN: [id number]           Patient Information     Date Of Birth          1955        Visit Information        Provider Department      10/4/2018 9:00 AM Viraj Santoyo MD [facility]        Today's Diagnoses     Urinary urgency    -  1    MS (multiple sclerosis) (H)          Care Instructions    1. Continue Rebif    2. Suggest trying one to two naproxen (Aleve) 220 mg about 30 minutes prior to Rebif injections    3. We will also try oxybutynin 5 mg twice daily for urinary urgency: prescription has been sent to your pharmacy    4. Blood tests today    5. Return to Xena Koenig CNP in 6 months          Follow-ups after your visit        Follow-up notes from your care team     Return in about 6 months (around 4/4/2019).      Your next 10 appointments already scheduled     Oct 04, 2018 10:15 AM CDT   LAB with  LAB   [facility] ([facility])    [address]  1st Floor  [facility] [id number] [phone]           Please do not eat 10-12 hours before your appointment if you are coming in fasting for labs on lipids, cholesterol, or glucose (sugar). This does not apply to pregnant women. Water, hot tea and black coffee (with nothing added) are okay. Do not drink other fluids, diet soda or chew gum.            Apr 04, 2019  9:30 AM CDT   (Arrive by 9:15 AM)   Return Multiple Sclerosis with SANDHYA Mccray CNP   [facility] ([facility])    [address]  [address] [place] [id number] [phone]              Future tests that were ordered for you today     Open Future Orders        Priority Expected Expires Ordered    CBC with platelets differential Routine 10/4/2018 12/31/2018 10/4/2018    Hepatic panel Routine 10/4/2018 12/31/2018 10/4/2018            Who to contact     If you have questions or need follow up  "information about today's clinic visit or your schedule please contact [facility] directly at [phone].  Normal or non-critical lab and imaging results will be communicated to you by [employer], letter or phone within 4 business days after the clinic has received the results. If you do not hear from us within 7 days, please contact the clinic through [employer] or phone. If you have a critical or abnormal lab result, we will notify you by phone as soon as possible.  Submit refill requests through [employer] or call your pharmacy and they will forward the refill request to us. Please allow 3 business days for your refill to be completed.          Additional Information About Your Visit        [employer] Information     [employer] gives you secure access to your electronic health record. If you see a primary care provider, you can also send messages to your care team and make appointments. If you have questions, please call your primary care clinic.  If you do not have a primary care provider, please call [phone] and they will assist you.        Care EveryWhere ID     This is your Care EveryWhere ID. This could be used by other organizations to access your [place] medical records  [id number]        Your Vitals Were     Pulse Height Last Period BMI (Body Mass Index)          80 1.689 m (5' 6.5\") 02/05/2008 27.73 kg/m2         Blood Pressure from Last 3 Encounters:   10/04/18 138/78   04/05/18 128/76   02/05/18 116/64    Weight from Last 3 Encounters:   10/04/18 79.1 kg (174 lb 6.4 oz)   04/05/18 77.6 kg (171 lb)   02/05/18 78.9 kg (174 lb)                 Today's Medication Changes          These changes are accurate as of 10/4/18 10:08 AM.  If you have any questions, ask your nurse or doctor.               Start taking these medicines.        Dose/Directions    oxybutynin 5 MG tablet   Commonly known as:  DITROPAN   Used for:  Urinary urgency   Started by:  Viraj Santoyo MD        Take one tablet " by mouth twice daily   Quantity:  60 tablet   Refills:  5            Where to get your medicines      These medications were sent to [facility] 2019 - [place], [place] - [address]  [address], [facility] [address]     Phone:  [phone]     oxybutynin 5 MG tablet                Primary Care Provider Office Phone # Fax #    Tommy Pringle [phone]       [address] [place] [address]        Equal Access to Services     [facility] : Hadii aad ku hadasho Soomaali, waaxda luqadaha, qaybta kaalmada adeegyada, waxay idiin hayaan adeeg kharash la'edward . So [facility] [phone].    ATENCIÓN: Si habla español, tiene a godinez disposición servicios gratuitos de asistencia lingüística. [facility] [phone].    We comply with applicable federal civil rights laws and [place] laws. We do not discriminate on the basis of race, color, national origin, age, disability, sex, sexual orientation, or gender identity.            Thank you!     Thank you for choosing [facility]  for your care. Our goal is always to provide you with excellent care. Hearing back from our patients is one way we can continue to improve our services. Please take a few minutes to complete the written survey that you may receive in the mail after your visit with us. Thank you!             Your Updated Medication List - Protect others around you: Learn how to safely use, store and throw away your medicines at www.disposemymeds.org.          This list is accurate as of 10/4/18 10:08 AM.  Always use your most recent med list.                   Brand Name Dispense Instructions for use Diagnosis    albuterol 108 (90 Base) MCG/ACT inhaler    PROAIR HFA/PROVENTIL HFA/VENTOLIN HFA    1 Inhaler    Inhale 2 puffs into the lungs every 6 hours as needed for shortness of breath / dyspnea or wheezing    Acute bronchospasm       aspirin 81 MG tablet     90 tablet    Take 1 tablet (81 mg) by mouth daily    Routine general  medical examination at a health care facility       B-12 1000 MCG Tbcr     100 tablet    Take 1,000 mcg by mouth daily        CALCIUM 600/VITAMIN D3 600-800 MG-UNIT Tabs   Generic drug:  Calcium Carb-Cholecalciferol      daily        glucosamine msm complex Tabs tablet     100 tablet    Take 1 tablet by mouth daily        Interferon Beta-1a 44 MCG/0.5ML Soaj      Taking Mon Weds and Friday        Krill Oil Omega-3 500 MG Caps      2 daily        oxybutynin 5 MG tablet    DITROPAN    60 tablet    Take one tablet by mouth twice daily    Urinary urgency       sertraline 50 MG tablet    ZOLOFT    135 tablet    Take 1.5 tablets a day    Adjustment disorder with anxious mood       vitamin D 2000 units tablet     100 tablet    3,000 units daily    Major depressive disorder, recurrent episode, mild (H)       WOMENS DAILY MULTIVITAMIN Tabs     30    1 TABLET DAILY

## 2018-10-17 ENCOUNTER — ALLIED HEALTH/NURSE VISIT (OUTPATIENT)
Dept: URGENT CARE | Facility: RETAIL CLINIC | Age: 63
End: 2018-10-17
Payer: COMMERCIAL

## 2018-10-17 DIAGNOSIS — Z23 NEED FOR PROPHYLACTIC VACCINATION AND INOCULATION AGAINST INFLUENZA: Primary | ICD-10-CM

## 2018-10-17 PROCEDURE — 90471 IMMUNIZATION ADMIN: CPT | Performed by: PHYSICIAN ASSISTANT

## 2018-10-17 PROCEDURE — 90682 RIV4 VACC RECOMBINANT DNA IM: CPT | Performed by: PHYSICIAN ASSISTANT

## 2018-10-17 PROCEDURE — 99207 ZZC NO CHARGE NURSE ONLY: CPT | Performed by: PHYSICIAN ASSISTANT

## 2018-10-17 NOTE — MR AVS SNAPSHOT
After Visit Summary   10/17/2018    Cecile Mays    MRN: [id number]           Patient Information     Date Of Birth          1955        Visit Information        Provider Department      10/17/2018 11:00 AM Piper Leach PA-C [facility]        Today's Diagnoses     Need for prophylactic vaccination and inoculation against influenza    -  1       Follow-ups after your visit        Your next 10 appointments already scheduled     Oct 31, 2018  7:30 AM CDT   Office Visit with Tommy Pringle MD   [facility]    [address] [place] [id number] [phone]           Bring a current list of meds and any records pertaining to this visit. For Physicals, please bring immunization records and any forms needing to be filled out. Please arrive 10 minutes early to complete paperwork.            Apr 04, 2019  9:30 AM CDT   (Arrive by 9:15 AM)   Return Multiple Sclerosis with SANDHYA Mccray [facility] ([facility])    [address] [place] [id number] [phone]              Who to contact     You can reach your care team any time of the day by calling [phone].  Notification of test results:  If you have an abnormal lab result, we will notify you by phone as soon as possible.         Additional Information About Your Visit        MyChart Information     [employer] gives you secure access to your electronic health record. If you see a primary care provider, you can also send messages to your care team and make appointments. If you have questions, please call your primary care clinic.  If you do not have a primary care provider, please call [phone] and they will assist you.        Care EveryWhere ID     This is your Care EveryWhere ID. This could be used by other organizations to access your [facility]  records  [id number]        Your Vitals Were     Last Period                   02/05/2008            Blood Pressure from Last 3 Encounters:   10/04/18 138/78   04/05/18 128/76   02/05/18 116/64    Weight from Last 3 Encounters:   10/04/18 174 lb 6.4 oz (79.1 kg)   04/05/18 171 lb (77.6 kg)   02/05/18 174 lb (78.9 kg)              We Performed the Following     FLU VACCINE, (RIV4) RECOMBINANT HA  , IM (FluBlok, egg free) [36137]- >18 YRS (FMG recommended  50-64 YRS)     Vaccine Administration, Initial [85474]        Primary Care Provider Office Phone # Fax #    Tommy Braulio Pringle [phone] [address] [place] [address]        Equal Access to Services     [facility]: Hadii jarod banuelos hadasho Soomaali, waaxda luqadaha, qaybta kaalmada adegiovanniyagayla, mini renee . So [facility] [phone].    ATENCIÓN: Si habla español, tiene a godinez disposición servicios gratuitos de asistencia lingüística. Allison al [phone].    We comply with applicable federal civil rights laws and [place] laws. We do not discriminate on the basis of race, color, national origin, age, disability, sex, sexual orientation, or gender identity.            Thank you!     Thank you for choosing [facility]  for your care. Our goal is always to provide you with excellent care. Hearing back from our patients is one way we can continue to improve our services. Please take a few minutes to complete the written survey that you may receive in the mail after your visit with us. Thank you!             Your Updated Medication List - Protect others around you: Learn how to safely use, store and throw away your medicines at www.disposemymeds.org.          This list is accurate as of 10/17/18 11:43 AM.  Always use your most recent med list.                   Brand Name Dispense Instructions for use Diagnosis    albuterol 108 (90 Base) MCG/ACT inhaler    PROAIR HFA/PROVENTIL HFA/VENTOLIN  HFA    1 Inhaler    Inhale 2 puffs into the lungs every 6 hours as needed for shortness of breath / dyspnea or wheezing    Acute bronchospasm       aspirin 81 MG tablet     90 tablet    Take 1 tablet (81 mg) by mouth daily    Routine general medical examination at a health care facility       B-12 1000 MCG Tbcr     100 tablet    Take 1,000 mcg by mouth daily        CALCIUM 600/VITAMIN D3 600-800 MG-UNIT Tabs   Generic drug:  Calcium Carb-Cholecalciferol      daily        glucosamine msm complex Tabs tablet     100 tablet    Take 1 tablet by mouth daily        Interferon Beta-1a 44 MCG/0.5ML Soaj      Taking Mon Weds and Friday        Krill Oil Omega-3 500 MG Caps      2 daily        oxybutynin 5 MG tablet    DITROPAN    60 tablet    Take one tablet by mouth twice daily    Urinary urgency       sertraline 50 MG tablet    ZOLOFT    135 tablet    Take 1.5 tablets a day    Adjustment disorder with anxious mood       vitamin D 2000 units tablet     100 tablet    3,000 units daily    Major depressive disorder, recurrent episode, mild (H)       WOMENS DAILY MULTIVITAMIN Tabs     30    1 TABLET DAILY

## 2018-10-17 NOTE — PROGRESS NOTES
Injectable Influenza Immunization Documentation    1.  Is the person to be vaccinated sick today?   No    2. Does the person to be vaccinated have an allergy to a component   of the vaccine?   No  Egg Allergy Algorithm Link    3. Has the person to be vaccinated ever had a serious reaction   to influenza vaccine in the past?   No    4. Has the person to be vaccinated ever had Guillain-Barré syndrome?   No    Form completed by JOYCELYN  Prior to injection verified patient identity using patient's name and date of birth.  Due to injection administration, patient instructed to remain in clinic for 15 minutes  afterwards, and to report any adverse reaction to me immediately.    Due to injection administration, patient instructed to remain in clinic for 15 minutes  afterwards, and to report any adverse reaction to me immediately.

## 2018-10-27 DIAGNOSIS — N39.0 RECURRENT UTI: ICD-10-CM

## 2018-10-27 LAB
ALBUMIN UR-MCNC: NEGATIVE MG/DL
AMORPH CRY #/AREA URNS HPF: ABNORMAL /HPF
APPEARANCE UR: ABNORMAL
BACTERIA #/AREA URNS HPF: ABNORMAL /HPF
BILIRUB UR QL STRIP: NEGATIVE
COLOR UR AUTO: YELLOW
GLUCOSE UR STRIP-MCNC: NEGATIVE MG/DL
GRAN CASTS #/AREA URNS LPF: 7 /LPF
HGB UR QL STRIP: NEGATIVE
KETONES UR STRIP-MCNC: NEGATIVE MG/DL
LEUKOCYTE ESTERASE UR QL STRIP: ABNORMAL
MUCOUS THREADS #/AREA URNS LPF: PRESENT /LPF
NITRATE UR QL: NEGATIVE
PH UR STRIP: 7 PH (ref 5–7)
RBC #/AREA URNS AUTO: 2 /HPF (ref 0–2)
SOURCE: ABNORMAL
SP GR UR STRIP: 1.01 (ref 1–1.03)
SQUAMOUS #/AREA URNS AUTO: 5 /HPF (ref 0–1)
UROBILINOGEN UR STRIP-MCNC: 0 MG/DL (ref 0–2)
WBC #/AREA URNS AUTO: 25 /HPF (ref 0–5)

## 2018-10-27 PROCEDURE — 87086 URINE CULTURE/COLONY COUNT: CPT | Performed by: FAMILY MEDICINE

## 2018-10-27 PROCEDURE — 81001 URINALYSIS AUTO W/SCOPE: CPT | Performed by: FAMILY MEDICINE

## 2018-10-28 LAB
BACTERIA SPEC CULT: NORMAL
Lab: NORMAL
SPECIMEN SOURCE: NORMAL

## 2018-10-31 ENCOUNTER — OFFICE VISIT (OUTPATIENT)
Dept: FAMILY MEDICINE | Facility: CLINIC | Age: 63
End: 2018-10-31
Payer: COMMERCIAL

## 2018-10-31 VITALS
RESPIRATION RATE: 16 BRPM | OXYGEN SATURATION: 96 % | SYSTOLIC BLOOD PRESSURE: 108 MMHG | WEIGHT: 174.1 LBS | HEART RATE: 100 BPM | TEMPERATURE: 97.4 F | DIASTOLIC BLOOD PRESSURE: 72 MMHG | BODY MASS INDEX: 27.68 KG/M2

## 2018-10-31 DIAGNOSIS — F33.0 MAJOR DEPRESSIVE DISORDER, RECURRENT EPISODE, MILD (H): ICD-10-CM

## 2018-10-31 DIAGNOSIS — F43.22 ADJUSTMENT DISORDER WITH ANXIOUS MOOD: ICD-10-CM

## 2018-10-31 DIAGNOSIS — Z87.440 H/O RECURRENT URINARY TRACT INFECTION: ICD-10-CM

## 2018-10-31 DIAGNOSIS — G35 MULTIPLE SCLEROSIS (H): Primary | ICD-10-CM

## 2018-10-31 DIAGNOSIS — R06.2 WHEEZING: ICD-10-CM

## 2018-10-31 DIAGNOSIS — G89.29 CHRONIC BILATERAL LOW BACK PAIN WITHOUT SCIATICA: ICD-10-CM

## 2018-10-31 DIAGNOSIS — M54.50 CHRONIC BILATERAL LOW BACK PAIN WITHOUT SCIATICA: ICD-10-CM

## 2018-10-31 PROCEDURE — 99214 OFFICE O/P EST MOD 30 MIN: CPT | Performed by: FAMILY MEDICINE

## 2018-10-31 RX ORDER — CIPROFLOXACIN 250 MG/1
250 TABLET, FILM COATED ORAL 2 TIMES DAILY
Qty: 6 TABLET | Refills: 0 | Status: SHIPPED | OUTPATIENT
Start: 2018-10-31 | End: 2019-03-28

## 2018-10-31 RX ORDER — SULFAMETHOXAZOLE/TRIMETHOPRIM 800-160 MG
1 TABLET ORAL 2 TIMES DAILY
Qty: 14 TABLET | Refills: 6 | Status: SHIPPED | OUTPATIENT
Start: 2018-10-31 | End: 2020-02-11

## 2018-10-31 RX ORDER — BUDESONIDE AND FORMOTEROL FUMARATE DIHYDRATE 160; 4.5 UG/1; UG/1
2 AEROSOL RESPIRATORY (INHALATION) 2 TIMES DAILY
Qty: 3 INHALER | Refills: 1 | Status: SHIPPED | OUTPATIENT
Start: 2018-10-31 | End: 2023-10-18

## 2018-10-31 RX ORDER — SERTRALINE HYDROCHLORIDE 100 MG/1
100 TABLET, FILM COATED ORAL DAILY
Qty: 90 TABLET | Refills: 1 | Status: SHIPPED | OUTPATIENT
Start: 2018-10-31 | End: 2019-05-14

## 2018-10-31 ASSESSMENT — ANXIETY QUESTIONNAIRES
1. FEELING NERVOUS, ANXIOUS, OR ON EDGE: MORE THAN HALF THE DAYS
5. BEING SO RESTLESS THAT IT IS HARD TO SIT STILL: NOT AT ALL
7. FEELING AFRAID AS IF SOMETHING AWFUL MIGHT HAPPEN: MORE THAN HALF THE DAYS
IF YOU CHECKED OFF ANY PROBLEMS ON THIS QUESTIONNAIRE, HOW DIFFICULT HAVE THESE PROBLEMS MADE IT FOR YOU TO DO YOUR WORK, TAKE CARE OF THINGS AT HOME, OR GET ALONG WITH OTHER PEOPLE: NOT DIFFICULT AT ALL
3. WORRYING TOO MUCH ABOUT DIFFERENT THINGS: MORE THAN HALF THE DAYS
6. BECOMING EASILY ANNOYED OR IRRITABLE: NEARLY EVERY DAY
GAD7 TOTAL SCORE: 11
2. NOT BEING ABLE TO STOP OR CONTROL WORRYING: MORE THAN HALF THE DAYS

## 2018-10-31 ASSESSMENT — PATIENT HEALTH QUESTIONNAIRE - PHQ9
5. POOR APPETITE OR OVEREATING: NOT AT ALL
SUM OF ALL RESPONSES TO PHQ QUESTIONS 1-9: 16

## 2018-10-31 ASSESSMENT — PAIN SCALES - GENERAL: PAINLEVEL: NO PAIN (0)

## 2018-10-31 NOTE — MR AVS SNAPSHOT
After Visit Summary   10/31/2018    Cecile Mays    MRN: [id number]           Patient Information     Date Of Birth          1955        Visit Information        Provider Department      10/31/2018 7:30 AM Tommy Pringle MD [facility]        Today's Diagnoses     Multiple sclerosis (H)    -  1    Major depressive disorder, recurrent episode, mild (H)        Adjustment disorder with anxious mood        Chronic bilateral low back pain without sciatica        H/O recurrent urinary tract infection        Wheezing          Care Instructions    Use new inhaler twice daily for up to one month seasonally as needed.  If cost is an issue ask insurance if they cover something equal to ordered one better.  Take Cipro now and request Bactrim from pharmacy if new urine symptoms.   Sertraline is now 100 mg.           Follow-ups after your visit        Your next 10 appointments already scheduled     Apr 04, 2019  9:30 AM CDT   (Arrive by 9:15 AM)   Return Multiple Sclerosis with SANDHYA Mccray [facility] ([facility] [place])    [address] [place] [address]  [address] [place] [id number] [phone]              Who to contact     If you have questions or need follow up information about today's clinic visit or your schedule please contact [facility] directly at [phone].  Normal or non-critical lab and imaging results will be communicated to you by MyChart, letter or phone within 4 business days after the clinic has received the results. If you do not hear from us within 7 days, please contact the clinic through MyChart or phone. If you have a critical or abnormal lab result, we will notify you by phone as soon as possible.  Submit refill requests through [employer] or call your pharmacy and they will forward the refill request to us. Please allow 3 business days for your refill to be completed.           Additional Information About Your Visit        [employer] Information     [employer] gives you secure access to your electronic health record. If you see a primary care provider, you can also send messages to your care team and make appointments. If you have questions, please call your primary care clinic.  If you do not have a primary care provider, please call [phone] and they will assist you.        Care EveryWhere ID     This is your Care EveryWhere ID. This could be used by other organizations to access your [place] medical records  [id number]        Your Vitals Were     Pulse Temperature Respirations Last Period Pulse Oximetry BMI (Body Mass Index)    100 97.4  F (36.3  C) (Temporal) 16 02/05/2008 96% 27.68 kg/m2       Blood Pressure from Last 3 Encounters:   10/31/18 108/72   10/04/18 138/78   04/05/18 128/76    Weight from Last 3 Encounters:   10/31/18 174 lb 1.6 oz (79 kg)   10/04/18 174 lb 6.4 oz (79.1 kg)   04/05/18 171 lb (77.6 kg)              Today, you had the following     No orders found for display         Today's Medication Changes          These changes are accurate as of 10/31/18  8:00 AM.  If you have any questions, ask your nurse or doctor.               Start taking these medicines.        Dose/Directions    budesonide-formoterol 160-4.5 MCG/ACT Inhaler   Commonly known as:  SYMBICORT   Used for:  Wheezing   Started by:  Tommy Pringle MD        Dose:  2 puff   Inhale 2 puffs into the lungs 2 times daily   Quantity:  3 Inhaler   Refills:  1       ciprofloxacin 250 MG tablet   Commonly known as:  CIPRO   Used for:  H/O recurrent urinary tract infection   Started by:  Tommy Pringle MD        Dose:  250 mg   Take 1 tablet (250 mg) by mouth 2 times daily   Quantity:  6 tablet   Refills:  0       sulfamethoxazole-trimethoprim 800-160 MG per tablet   Commonly known as:  BACTRIM DS/SEPTRA DS   Used for:  H/O recurrent urinary tract infection   Started by:  Tommy Pringle MD         Dose:  1 tablet   Take 1 tablet by mouth 2 times daily   Quantity:  14 tablet   Refills:  6         These medicines have changed or have updated prescriptions.        Dose/Directions    sertraline 100 MG tablet   Commonly known as:  ZOLOFT   This may have changed:    - medication strength  - how much to take  - how to take this  - when to take this  - additional instructions   Used for:  Major depressive disorder, recurrent episode, mild (H)   Changed by:  Tommy Pringle MD        Dose:  100 mg   Take 1 tablet (100 mg) by mouth daily   Quantity:  90 tablet   Refills:  1            Where to get your medicines      These medications were sent to [address]  [address], [facility] [address]     Phone:  [phone]     budesonide-formoterol 160-4.5 MCG/ACT Inhaler    ciprofloxacin 250 MG tablet    sertraline 100 MG tablet    sulfamethoxazole-trimethoprim 800-160 MG per tablet                Primary Care Provider Office Phone # Fax #    Tommy Pringle [phone]       [address] [place] [address]        Equal Access to Services     [facility]: Hadii jarod banuelos hadasho Soraymond, waaxda luqadaha, qaybta kaalmada adeegsintia, mini renee . So [facility] [phone].    ATENCIÓN: Si habla español, tiene a godinez disposición servicios gratuitos de asistencia lingüística. [facility] [phone].    We comply with applicable federal civil rights laws and [place] laws. We do not discriminate on the basis of race, color, national origin, age, disability, sex, sexual orientation, or gender identity.            Thank you!     Thank you for choosing [facility]  for your care. Our goal is always to provide you with excellent care. Hearing back from our patients is one way we can continue to improve our services. Please take a few minutes to complete the written survey that you may receive in the mail after your visit with  us. Thank you!             Your Updated Medication List - Protect others around you: Learn how to safely use, store and throw away your medicines at www.disposemymeds.org.          This list is accurate as of 10/31/18  8:00 AM.  Always use your most recent med list.                   Brand Name Dispense Instructions for use Diagnosis    albuterol 108 (90 Base) MCG/ACT inhaler    PROAIR HFA/PROVENTIL HFA/VENTOLIN HFA    1 Inhaler    Inhale 2 puffs into the lungs every 6 hours as needed for shortness of breath / dyspnea or wheezing    Acute bronchospasm       aspirin 81 MG tablet     90 tablet    Take 1 tablet (81 mg) by mouth daily    Routine general medical examination at a health care facility       B-12 1000 MCG Tbcr     100 tablet    Take 1,000 mcg by mouth daily        budesonide-formoterol 160-4.5 MCG/ACT Inhaler    SYMBICORT    3 Inhaler    Inhale 2 puffs into the lungs 2 times daily    Wheezing       CALCIUM 600/VITAMIN D3 600-800 MG-UNIT Tabs   Generic drug:  Calcium Carb-Cholecalciferol      daily        ciprofloxacin 250 MG tablet    CIPRO    6 tablet    Take 1 tablet (250 mg) by mouth 2 times daily    H/O recurrent urinary tract infection       glucosamine msm complex Tabs tablet     100 tablet    Take 1 tablet by mouth daily        Interferon Beta-1a 44 MCG/0.5ML Soaj      Taking Mon Weds and Friday        Krill Oil Omega-3 500 MG Caps      2 daily        oxybutynin 5 MG tablet    DITROPAN    60 tablet    Take one tablet by mouth twice daily    Urinary urgency       sertraline 100 MG tablet    ZOLOFT    90 tablet    Take 1 tablet (100 mg) by mouth daily    Major depressive disorder, recurrent episode, mild (H)       sulfamethoxazole-trimethoprim 800-160 MG per tablet    BACTRIM DS/SEPTRA DS    14 tablet    Take 1 tablet by mouth 2 times daily    H/O recurrent urinary tract infection       vitamin D 2000 units tablet     100 tablet    3,000 units daily    Major depressive disorder, recurrent episode, mild  (H)       WOMENS DAILY MULTIVITAMIN Tabs     30    1 TABLET DAILY

## 2018-10-31 NOTE — PATIENT INSTRUCTIONS
Use new inhaler twice daily for up to one month seasonally as needed.  If cost is an issue ask insurance if they cover something equal to ordered one better.  Take Cipro now and request Bactrim from pharmacy if new urine symptoms.   Sertraline is now 100 mg.

## 2018-10-31 NOTE — PROGRESS NOTES
SUBJECTIVE:   Cecile Mays is a 62 year old female who presents to clinic today for the following health issues:    Depression and Anxiety Follow-Up    Status since last visit: No change    Other associated symptoms:None    Complicating factors:     Significant life event: Yes-still dealing with MS but doing reasonably well.  Uses [employer] in place of work because she can do it at her time and ability     Current substance abuse: None    PHQ 1/8/2018   PHQ-9 Total Score 6   Q9: Suicide Ideation Not at all     LEATHA-7 SCORE 1/27/2017 1/8/2018   Total Score 13 13     Patient is not at harm to herself  PHQ-9  English  PHQ-9   Any Language  LEATHA-7  Suicide Assessment Five-step Evaluation and Treatment (SAFE-T)    Amount of exercise or physical activity: None    Problems taking medications regularly: No    Medication side effects: none    Diet: regular (no restrictions) and low fat/cholesterol        Every fall and spring patient finds she is allergic to something and has persistent cough with wheezing.  She uses albuterol but wonders if there is something else that might help more.    Also patient is having relatively frequent urinary tract infections about every 2-3 months.  She did not have this before MS.  Current 3-day treatment of Bactrim did not take care of symptoms completely.  She wonders what she might do going forward    Problem list and histories reviewed & adjusted, as indicated.  Additional history: as documented    BP Readings from Last 3 Encounters:   10/31/18 108/72   10/04/18 138/78   04/05/18 128/76    Wt Readings from Last 3 Encounters:   10/31/18 174 lb 1.6 oz (79 kg)   10/04/18 174 lb 6.4 oz (79.1 kg)   04/05/18 171 lb (77.6 kg)                  Labs reviewed in EPIC    Reviewed and updated as needed this visit by clinical staff       Reviewed and updated as needed this visit by Provider         ROS:  Constitutional, HEENT, cardiovascular, pulmonary, gi and gu systems are  "negative, except as otherwise noted.    OBJECTIVE:     /72  Pulse 100  Temp 97.4  F (36.3  C) (Temporal)  Resp 16  Wt 174 lb 1.6 oz (79 kg)  LMP 02/05/2008  SpO2 96%  BMI 27.68 kg/m2  Body mass index is 27.68 kg/(m^2).  GENERAL: healthy, alert and no distress  EYES: Eyes grossly normal to inspection, PERRL and conjunctivae and sclerae normal  NECK: no adenopathy, no asymmetry, masses, or scars and thyroid normal to palpation  RESP: lungs clear to auscultation - no rales, rhonchi or wheezes  CV: regular rate and rhythm, normal S1 S2, no S3 or S4, no murmur, click or rub, no peripheral edema and peripheral pulses strong  ABDOMEN: soft, nontender, no hepatosplenomegaly, no masses and bowel sounds normal  MS: no gross musculoskeletal defects noted, no edema  NEURO: Normal strength and tone, sensory exam grossly normal, mentation intact and grossly normal today with no obvious sign of imbalance or incoordination or vision issues  PSYCH: mentation appears normal, affect normal/bright, judgement and insight intact, appearance well groomed and this comes with a little of her underlying anxiety and self criticism but much better than usual    Diagnostic Test Results:  none     ASSESSMENT/PLAN:           Tobacco Cessation:   reports that she has never smoked. She has never used smokeless tobacco.      BMI:   Estimated body mass index is 27.68 kg/(m^2) as calculated from the following:    Height as of 10/4/18: 5' 6.5\" (1.689 m).    Weight as of this encounter: 174 lb 1.6 oz (79 kg).   Weight management plan: Discussed healthy diet and exercise guidelines and patient will follow up in 12 months in clinic to re-evaluate.      1. Major depressive disorder, recurrent episode, mild (H)  Doing quite well.  Due to pill structure change, she is having trouble with 75 mg dose.  She would like to just have 100 mg tablets.  She would not need to split these tablets.  Therefore we are increasing the dose.  I do not think it " will cause any side effects and may be slightly more beneficial  - sertraline (ZOLOFT) 100 MG tablet; Take 1 tablet (100 mg) by mouth daily  Dispense: 90 tablet; Refill: 1    2. Multiple sclerosis (H)  Managing well with neurology and current medication.  Change of work has helped immensely.  Been able to control stress and obligations has helped    3. Adjustment disorder with anxious mood  Basically as above with #1    4. Chronic bilateral low back pain without sciatica  Little or no problem currently.  She continues to be physically active and doing okay.    5. H/O recurrent urinary tract infection  Suspect this is related to MS and being female in her 60s.  We will complete treatment for this infection with ciprofloxacin.  She has Bactrim on hand or to  by calling pharmacy at any time over the next months.  If she is using this more often than monthly she will need to let us know  - ciprofloxacin (CIPRO) 250 MG tablet; Take 1 tablet (250 mg) by mouth 2 times daily  Dispense: 6 tablet; Refill: 0  - sulfamethoxazole-trimethoprim (BACTRIM DS/SEPTRA DS) 800-160 MG per tablet; Take 1 tablet by mouth 2 times daily  Dispense: 14 tablet; Refill: 6    6. Wheezing  This is allergic in nature and not really asthma.  Trying Symbicort get things in control along with albuterol.  Might consider montelukast of we have never done that.  - budesonide-formoterol (SYMBICORT) 160-4.5 MCG/ACT Inhaler; Inhale 2 puffs into the lungs 2 times daily  Dispense: 3 Inhaler; Refill: 1    MEDICATIONS:  Continue current medications without change or as noted above  Patient Instructions   Use new inhaler twice daily for up to one month seasonally as needed.  If cost is an issue ask insurance if they cover something equal to ordered one better.  Take Cipro now and request Bactrim from pharmacy if new urine symptoms.   Sertraline is now 100 mg.     Time spent with greater than 50% spent in discussion, making the plan, or filling out  paperwork with patient for illness/treatments was 25 minutes or more.  We discussed coping with MS in urinary tract infections especially.    Tommy Pringle MD  [facility]

## 2018-11-01 ASSESSMENT — ANXIETY QUESTIONNAIRES: GAD7 TOTAL SCORE: 11

## 2018-11-02 ENCOUNTER — MYC MEDICAL ADVICE (OUTPATIENT)
Dept: NEUROLOGY | Facility: CLINIC | Age: 63
End: 2018-11-02

## 2018-11-12 ENCOUNTER — TELEPHONE (OUTPATIENT)
Dept: NEUROLOGY | Facility: CLINIC | Age: 63
End: 2018-11-12

## 2018-11-12 DIAGNOSIS — G35 MULTIPLE SCLEROSIS (H): Primary | ICD-10-CM

## 2018-11-12 NOTE — TELEPHONE ENCOUNTER
[facility] Call Center    Phone Message    May a detailed message be left on voicemail: yes    Reason for Call: Medication Refill Request    Has the patient contacted the pharmacy for the refill? Yes   Name of medication being requested: Rebidose 44 mcg  Provider who prescribed the medication: Dr. Del Rio  Pharmacy: [facility] Fax:[phone]  Date medication is needed: Pt has 1 and 1/2 weeks worth of meds.         Action Taken: Message routed to:  [facility] ([facility] NEURO

## 2018-11-13 NOTE — TELEPHONE ENCOUNTER
Received refill request for Rebif from [facility]; Patient was last seen in October by Dr. Santoyo and has follow up appointment in April with Xena Koenig; Dr. Santoyo, you are not the most recent prescriber of the Rebif, but I would presume you are okay with a script being sent? Thank you.    Mer Garcia, MS RN Care Coordinator

## 2018-12-18 ENCOUNTER — OFFICE VISIT (OUTPATIENT)
Dept: URGENT CARE | Facility: RETAIL CLINIC | Age: 63
End: 2018-12-18
Payer: COMMERCIAL

## 2018-12-18 VITALS
DIASTOLIC BLOOD PRESSURE: 72 MMHG | TEMPERATURE: 98.1 F | OXYGEN SATURATION: 95 % | HEART RATE: 72 BPM | SYSTOLIC BLOOD PRESSURE: 122 MMHG

## 2018-12-18 DIAGNOSIS — J20.9 ACUTE BRONCHITIS WITH COEXISTING CONDITION REQUIRING PROPHYLACTIC TREATMENT: Primary | ICD-10-CM

## 2018-12-18 PROCEDURE — 99213 OFFICE O/P EST LOW 20 MIN: CPT | Performed by: FAMILY MEDICINE

## 2018-12-18 RX ORDER — AZITHROMYCIN 250 MG/1
TABLET, FILM COATED ORAL
Qty: 6 TABLET | Refills: 0 | Status: SHIPPED | OUTPATIENT
Start: 2018-12-18 | End: 2019-03-28

## 2018-12-18 RX ORDER — PREDNISONE 20 MG/1
TABLET ORAL
Qty: 15 TABLET | Refills: 0 | Status: SHIPPED | OUTPATIENT
Start: 2018-12-18 | End: 2019-03-28

## 2018-12-18 NOTE — PROGRESS NOTES
SUBJECTIVE:  Cecile Mays is a 63 year old female who presents to the clinic today with a chief complaint of cough  and wheezing. for 2 week(s).  Her cough is described as productive of yellow sputum.    The patient's symptoms are moderate and worsening.  Associated symptoms include congestion, malaise and wheezing. The patient's symptoms are exacerbated by lying down  Patient has been using albuterol nebs and inhaler  to improve symptoms.    Past Medical History:   Diagnosis Date     Allergy      Depressive disorder      High cholesterol      Current Outpatient Medications   Medication Sig Dispense Refill     albuterol (PROAIR HFA/PROVENTIL HFA/VENTOLIN HFA) 108 (90 BASE) MCG/ACT Inhaler Inhale 2 puffs into the lungs every 6 hours as needed for shortness of breath / dyspnea or wheezing 1 Inhaler 1     aspirin 81 MG tablet Take 1 tablet (81 mg) by mouth daily 90 tablet 3     azithromycin (ZITHROMAX) 250 MG tablet Two tablets first day, then one tablet daily for four days. 6 tablet 0     budesonide-formoterol (SYMBICORT) 160-4.5 MCG/ACT Inhaler Inhale 2 puffs into the lungs 2 times daily 3 Inhaler 1     Calcium Carb-Cholecalciferol (CALCIUM 600/VITAMIN D3) 600-800 MG-UNIT TABS daily       Cholecalciferol (VITAMIN D) 2000 UNITS tablet 3,000 units daily 100 tablet 3     Cyanocobalamin (B-12) 1000 MCG TBCR Take 1,000 mcg by mouth daily 100 tablet 1     Glucos-MSM-C-Nu-Rrbytn-Lketoz (GLUCOSAMINE MSM COMPLEX) TABS tablet Take 1 tablet by mouth daily 100 tablet 3     Interferon Beta-1a 44 MCG/0.5ML SOAJ Inject 44 mcg Subcutaneous three times a week 12 Syringe 11     Krill Oil Omega-3 500 MG CAPS 2 daily       oxybutynin (DITROPAN) 5 MG tablet Take one tablet by mouth twice daily 60 tablet 5     predniSONE (DELTASONE) 20 MG tablet 2 daily for 5 days. 1 daily for 5 days. 15 tablet 0     sertraline (ZOLOFT) 100 MG tablet Take 1 tablet (100 mg) by mouth daily 90 tablet 1     WOMENS DAILY MULTIVITAMIN OR TABS 1  TABLET DAILY 30 0     ciprofloxacin (CIPRO) 250 MG tablet Take 1 tablet (250 mg) by mouth 2 times daily (Patient not taking: Reported on 12/18/2018) 6 tablet 0     sulfamethoxazole-trimethoprim (BACTRIM DS/SEPTRA DS) 800-160 MG per tablet Take 1 tablet by mouth 2 times daily (Patient not taking: Reported on 12/18/2018) 14 tablet 6     History   Smoking Status     Never Smoker   Smokeless Tobacco     Never Used       ROS  Review of systems negative except as stated above.    OBJECTIVE:  /72   Pulse 72   Temp 98.1  F (36.7  C) (Oral)   LMP 02/05/2008   SpO2 95%   GENERAL APPEARANCE: moderate distress  EYES: EOMI,  PERRL, conjunctiva clear  HENT: ear canals and TM's normal.  Nose and mouth without ulcers, erythema or lesions  NECK: supple, nontender, no lymphadenopathy  RESP: expiratory wheezes throughout  CV: regular rates and rhythm, normal S1 S2, no murmur noted  ABDOMEN:  soft, nontender, no HSM or masses and bowel sounds normal  NEURO: Normal strength and tone, sensory exam grossly normal,  normal speech and mentation  SKIN: no suspicious lesions or rashes    ASSESSMENT:    Acute Bronchitis  Asthma exacerbation    PLAN:  Tapering prednisone and Zpak, printed info, rest.  Symptomatic measures encouraged, humidified air, plenty of fluids.  Follow up with primary care provider if no improvement.

## 2018-12-18 NOTE — PATIENT INSTRUCTIONS
Patient Education     Viral or Bacterial Bronchitis with Wheezing (Adult)    Bronchitis is an infection of the air passages. It often occurs during a cold and is usually caused by a virus. Symptoms include cough with mucus (phlegm) and low-grade fever. This illness is contagious during the first few days and is spread through the air by coughing and sneezing, or by direct contact (touching the sick person and then touching your own eyes, nose, or mouth).  If there is a lot of inflammation, air flow is restricted. The air passages may also go into spasm, especially if you have asthma. This causes wheezing and difficulty breathing even in people who do not have asthma.  Bronchitis usually lasts 7 to 14 days. The wheezing should improve with treatment during the first week. An inhaler is often prescribed to relax the air passages and stop wheezing. Antibiotics will be prescribed if your doctor thinks there is also a secondary bacterial infection.  Home care    If symptoms are severe, rest at home for the first 2 to 3 days. When you go back to your usual activities, don't let yourself get too tired.    Dont s'moke. Also avoid being exposed to secondhand smoke.    You may use over-the-counter medicine to control fever or pain, unless another medicine was prescribed. Note: If you have chronic liver or kidney disease or have ever had a stomach ulcer or gastrointestinal bleeding, talk with your healthcare provider before using these medicines. Also talk to your provider if you are taking medicine to prevent blood clots.) Aspirin should never be given to anyone younger than 18 years of age who is ill with a viral infection or fever. It may cause severe liver or brain damage.    Your appetite may be poor, so a light diet is fine. Stay well hydrated by drinking 6 to 8 glasses of fluids per day (such as water, soft drinks, sports drinks, juices, tea, or soup). Extra fluids will help loosen secretions in the nose and  lungs.    Over-the-counter cough, cold, and sore-throat medicines will not shorten the length of the illness, but they may be helpful to reduce symptoms. (Note: Don't use decongestants if you have high blood pressure.)    If you were given an inhaler, use it exactly as directed. If you need to use it more often than prescribed, your condition may be worsening. If this happens, contact your healthcare provider.    If prescribed, finish all antibiotic medicine, even if you are feeling better after only a few days.  Follow-up care  Follow up with your healthcare provider, or as advised. If you had an X-ray or ECG (electrocardiogram), a specialist will review it. You will be notified of any new findings that may affect your care.  If you are age 65 or older, or if you have a chronic lung disease or condition that affects your immune system, or you smoke, ask your healthcare provider about getting a pneumococcal vaccine and a yearly flu shot (influenza vaccine).  When to seek medical advice  Call your healthcare provider right away if any of these occur:    Fever of 100.4 F (38 C) or higher, or as directed by your healthcare provider    Coughing up increasing amounts of colored sputum    Weakness, drowsiness, headache, facial pain, ear pain, or a stiff neck  Call 911  Call 911 if any of these occur.    Coughing up blood    Worsening weakness, drowsiness, headache, or stiff neck    Increased wheezing not helped with medication, shortness of breath, or pain with breathing   Date Last Reviewed: 6/1/2018 2000-2018 The "[employer]. [address] [place] [address], [place] [address]. All rights reserved. This information is not intended as a substitute for professional medical care. Always follow your healthcare professional's instructions.

## 2019-02-06 ENCOUNTER — OFFICE VISIT (OUTPATIENT)
Dept: OPHTHALMOLOGY | Facility: CLINIC | Age: 64
End: 2019-02-06
Attending: OPHTHALMOLOGY
Payer: COMMERCIAL

## 2019-02-06 DIAGNOSIS — H53.10 SUBJECTIVE VISUAL DISTURBANCE: ICD-10-CM

## 2019-02-06 DIAGNOSIS — H53.2 DOUBLE VISION: ICD-10-CM

## 2019-02-06 PROCEDURE — G0463 HOSPITAL OUTPT CLINIC VISIT: HCPCS | Mod: ZF | Performed by: TECHNICIAN/TECHNOLOGIST

## 2019-02-06 PROCEDURE — 92015 DETERMINE REFRACTIVE STATE: CPT | Mod: ZF | Performed by: TECHNICIAN/TECHNOLOGIST

## 2019-02-06 ASSESSMENT — REFRACTION_MANIFEST
OS_AXIS: 090
OD_SPHERE: +0.25
OS_ADD: +2.50
OD_ADD: +2.50
OS_SPHERE: -0.25
OS_CYLINDER: +0.50
OD_CYLINDER: +0.25
OD_AXIS: 120

## 2019-02-06 ASSESSMENT — REFRACTION_WEARINGRX
OD_SPHERE: +0.50
OS_ADD: +2.50
OS_SPHERE: PLANO
OS_AXIS: 088
OD_AXIS: 128
OS_CYLINDER: +0.50
OD_ADD: +2.50
SPECS_TYPE: PAL
OD_HPRISM: 1 BI
OS_HPRISM: 1 BI
OD_CYLINDER: +1.00
OD_VPRISM: 1 BD

## 2019-02-06 ASSESSMENT — VISUAL ACUITY
OS_CC: 20/30
OS_CC+: +2
CORRECTION_TYPE: GLASSES
METHOD: SNELLEN - LINEAR
OD_CC: 20/30

## 2019-02-06 ASSESSMENT — TONOMETRY
OS_IOP_MMHG: 13
OD_IOP_MMHG: 14
IOP_METHOD: ICARE

## 2019-02-06 ASSESSMENT — CUP TO DISC RATIO
OD_RATIO: 0.2
OS_RATIO: 0.2

## 2019-02-06 ASSESSMENT — SLIT LAMP EXAM - LIDS
COMMENTS: NORMAL
COMMENTS: NORMAL

## 2019-02-06 ASSESSMENT — EXTERNAL EXAM - LEFT EYE: OS_EXAM: NORMAL

## 2019-02-06 ASSESSMENT — EXTERNAL EXAM - RIGHT EYE: OD_EXAM: NORMAL

## 2019-02-06 ASSESSMENT — CONF VISUAL FIELD
OS_NORMAL: 1
METHOD: COUNTING FINGERS

## 2019-02-06 NOTE — NURSING NOTE
Chief Complaint(s) and History of Present Illness(es)     New Patient     Laterality: Referral for MS.              Comments     Diagnosed with MS in May 2016.   Double vision when it is cold.   Blurry vision when it is cold.   Glasses are 18 months, has prisms in them. Patient unsure why prisms were added to her glasses. First pair of glasses that has prisms.      JAYASHREE Toribio 2/6/2019 9:14 AM

## 2019-02-06 NOTE — PROGRESS NOTES
1. Subjective visual disturbance since 2016 in the context of established multiple sclerosis diagnosis.  Neuro-ophthalmologic exam shows normal motility and alignment. There are no efferent ocular motor abnormalities that correlate to patient's symptoms.  I believe her blurred vision in the right eye is what disrupts her binocular function as she always closes her right eye to resolve her subjective visual disturbance.    2. Suboptimal best corrected visual acuity in the right eye today.  Today's visit was focused on assessing for strabismus and efferent disease.  Will ask patient to return for octopus automated 30 degree visual field testing, OCT of the optic nerve head / macula, and corneal topography to better assess why the best corrected visual acuity in the right eye is 20/30.  She may have suffered prior mild demyelinating optic neuritis in the right eye or could have post laser in situ keratomileusis (LASIK) keratoectasia or some other occult maculopathy.  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    3. Refractive error- new glasses today improve vision in the left eye to near 20/20 but right eye remains 20/30 with new updated prescription (given to patient today)    Cecile Mays is a pleasant 63 year old White female with a history of multiple sclerosis who presents to my neuro-ophthalmology clinic today on referral from Dr. Mireles for problems focusing with binocular vision.    History of Present Illness:  Cecile Mays is a 63 year old female with a history of multiple sclerosis, diagnosed in 2016, and LASIK in both eyes in 2005, who presents with subjective difficulty focusing binocular vision. She reports that she has had trouble focusing both eyes together since her first attack of MS in 2016. The difficulty focusing is mostly constant and has been slightly worse since 2016. The  problem seems to get worse in the cold, for example, when going for a walk outside. She also endorses more classical symptoms of worsening vision when exercising/raising her body temperature, for example, doing hot yoga. She also notices that the symptoms seem to be worse when she's tired. She denies any diplopia or seeing two distinct or overlapping images, rather, she endorses difficulty focusing both eyes at once. This is improved when closing her right eye (i.e. Only using her left eye). She has been adapting by closing right eye frequently. When she covers her left eye, the vision is subjectively poorer on the right. She notices the vision problem with far > near vision.  She complains of glare from lights in both eyes.    She last saw an optometrist, Dr. Patel at [facility], on February 20, 2018. At that time, her exam was notable for deep central visual field defects in right eye, and moderate defects in left eye. Optical coherence testing showed thinning of the nerve fiber layer in both eyes, thought to be secondary to MS at the time. He recommended prisms at the time (right lens), but while Ms. Mays noticed initial improvement but now she has recurrence of the trouble focusing.     Patient was seen by Dr. Mireles on October 4, 2018 for regular MS-related follow-up. She reported new visual symptoms at that time and was subseuqently referred to Neuro-op for evaluation of diplopia and to establish a baseline ocular exam.     Ancillary Studies:  None    Exam:   On examination today, corrected visual acuity 20/30 on the right, 20/30+2 on the left with manifest refraction she did not improve in the right eye and did improve to 20/25+2 in the left eye (near 20/20). Color plates 11/11 bilaterally. No afferent pupillary defect per orthoptist. Visual fields full to confrontation.  Strabismus exam normal without any misallignment in any gaze position. Slit lamp examination normal. Dilated  fundoscopic exam unremarkable. Cranial nerves V1-3, 7,8,9,11, and 12 were normal bilaterally.    Assessment/Plan:   Cecile Mays is a 63 year old female with a history of MS presenting with subjective difficulty focusing with binocular vision without diplopia and no evidence of strabismus. She was found to have new manifest refraction today which improved vision in the left eye as compared to prior exam at outside clinic. The patient's history and examination today are more consistent with afferent visual deficits in the right eye, most likely secondary to MS, rather than an efferent abnormality.  She had no evidence of nystagmus, strabismus, nor internuclear ophthalmoplegia which might be associated with a disorder of binocularity.    To better understand the status of the patient's afferent visual function, will require another appointment with ocular coherence testing to assess optic nerve head and macula. Will also check corneal topography at next visit also given her prior history of laser in situ keratomileusis (LASIK).       I spent a total of 60 minutes face to face with Cecile Mays during today's office visit.  Over 50% of this time was spent counseling the patient and/or coordinating care regarding her multiple sclerosis and subjective visual disturbance.    Complete documentation of historical and exam elements from today's encounter can be found in the full encounter summary report (not reduplicated in this progress note).  I personally re-obtained the chief complaint(s) and history of present illness.  I confirmed and edited as necessary the review of systems, past medical/surgical history, family history, social history, and examination findings as documented by others; and I examined the patient myself.  I personally reviewed the relevant tests, images, and reports as documented above.  I formulated and edited as necessary the assessment and plan and discussed the findings and  management plan with the patient and family     A medical student was involved in the care of the patient. I was present with the medical student who participated in the service and in the documentation of the note. I have  verified the history and personally performed the physical exam and medical decision making. I extensively reviewed and edited when necessary the assessment and plan. I agree with the assessment and plan of care as documented in the note    MD Ann Marie Vargas, MS4

## 2019-02-06 NOTE — LETTER
2019    RE: Cecile Mays  : 1955  MRN: [id number]    Dear Dr. Santoyo    Thank you for referring your patient, Cecile Mays, to my neuro-ophthalmology clinic recently.  After a thorough neuro-ophthalmic history and examination, I came to the following conclusions:      1. Subjective visual disturbance since 2016 in the context of established multiple sclerosis diagnosis.  Neuro-ophthalmologic exam shows normal motility and alignment. There are no efferent ocular motor abnormalities that correlate to patient's symptoms.  I believe her blurred vision in the right eye is what disrupts her binocular function as she always closes her right eye to resolve her subjective visual disturbance.    2. Suboptimal best corrected visual acuity in the right eye today.  Today's visit was focused on assessing for strabismus and efferent disease.  Will ask patient to return for octopus automated 30 degree visual field testing, OCT of the optic nerve head / macula, and corneal topography to better assess why the best corrected visual acuity in the right eye is 20/30.  She may have suffered prior mild demyelinating optic neuritis in the right eye or could have post laser in situ keratomileusis (LASIK) keratoectasia or some other occult maculopathy.  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    3. Refractive error- new glasses today improve vision in the left eye to near 20/20 but right eye remains 20/30 with new updated prescription (given to patient today)    Cecile Mays is a pleasant 63 year old White female with a history of multiple sclerosis who presents to my neuro-ophthalmology clinic today on referral from Dr. Mireles for problems focusing with binocular vision.    History of Present Illness:  Cecile Masy is a 63 year old female with a history of multiple  sclerosis, diagnosed in 2016, and LASIK in both eyes in 2005, who presents with subjective difficulty focusing binocular vision. She reports that she has had trouble focusing both eyes together since her first attack of MS in 2016. The difficulty focusing is mostly constant and has been slightly worse since 2016. The problem seems to get worse in the cold, for example, when going for a walk outside. She also endorses more classical symptoms of worsening vision when exercising/raising her body temperature, for example, doing hot yoga. She also notices that the symptoms seem to be worse when she's tired. She denies any diplopia or seeing two distinct or overlapping images, rather, she endorses difficulty focusing both eyes at once. This is improved when closing her right eye (i.e. Only using her left eye). She has been adapting by closing right eye frequently. When she covers her left eye, the vision is subjectively poorer on the right. She notices the vision problem with far > near vision.  She complains of glare from lights in both eyes.    She last saw an optometrist, Dr. Patel at [facility], on February 20, 2018. At that time, her exam was notable for deep central visual field defects in right eye, and moderate defects in left eye. Optical coherence testing showed thinning of the nerve fiber layer in both eyes, thought to be secondary to MS at the time. He recommended prisms at the time (right lens), but while Ms. Mays noticed initial improvement but now she has recurrence of the trouble focusing.     Patient was seen by Dr. Mireles on October 4, 2018 for regular MS-related follow-up. She reported new visual symptoms at that time and was subseuqently referred to Neuro-op for evaluation of diplopia and to establish a baseline ocular exam.     Ancillary Studies:  None    Exam:   On examination today, corrected visual acuity 20/30 on the right, 20/30+2 on the left with manifest refraction she did  not improve in the right eye and did improve to 20/25+2 in the left eye (near 20/20). Color plates 11/11 bilaterally. No afferent pupillary defect per orthoptist. Visual fields full to confrontation.  Strabismus exam normal without any misallignment in any gaze position. Slit lamp examination normal. Dilated fundoscopic exam unremarkable. Cranial nerves V1-3, 7,8,9,11, and 12 were normal bilaterally.    Assessment/Plan:   Cecile Mays is a 63 year old female with a history of MS presenting with subjective difficulty focusing with binocular vision without diplopia and no evidence of strabismus. She was found to have new manifest refraction today which improved vision in the left eye as compared to prior exam at outside clinic. The patient's history and examination today are more consistent with afferent visual deficits in the right eye, most likely secondary to MS, rather than an efferent abnormality.  She had no evidence of nystagmus, strabismus, nor internuclear ophthalmoplegia which might be associated with a disorder of binocularity.    To better understand the status of the patient's afferent visual function, will require another appointment with ocular coherence testing to assess optic nerve head and macula. Will also check corneal topography at next visit also given her prior history of laser in situ keratomileusis (LASIK).      Again, thank you for trusting me with the care of your patient.  For further exam details, please feel free to contact our office for additional records.  If you wish to contact me regarding this patient please email me at [email or web address] or give my clinic a call to arrange a phone conversation.    Sincerely,    Kevon Fortune MD  , Neuro-Ophthalmology and Adult Strabismus Surgery  [facility] Chair in Neuro-Ophthalmology  Department of Ophthalmology and Visual Neurosciences  [facility]    DX: multiple sclerosis,  subjective visual disturbance

## 2019-03-08 ENCOUNTER — TELEPHONE (OUTPATIENT)
Dept: FAMILY MEDICINE | Facility: CLINIC | Age: 64
End: 2019-03-08

## 2019-03-08 NOTE — TELEPHONE ENCOUNTER
Patient is due for a PHQ-9.  Index start date:2/28/2019  Index end date:6/30/2019    Please call patient. Pt is active on [employer]. I have sent a PHQ-9 via [employer] and will postpone the encounter. Ruth Ann Edmondson CMA ([facility])

## 2019-03-20 NOTE — TELEPHONE ENCOUNTER
Pt completed PHQ-9.    PHQ-9 SCORE 3/19/2019   PHQ-9 Total Score -   PHQ-9 Total Score MyChart 5 (Mild depression)   PHQ-9 Total Score 5     Ruth Ann Edmondson CMA ([facility])

## 2019-03-28 ENCOUNTER — OFFICE VISIT (OUTPATIENT)
Dept: NEUROLOGY | Facility: CLINIC | Age: 64
End: 2019-03-28
Attending: NURSE PRACTITIONER
Payer: COMMERCIAL

## 2019-03-28 ENCOUNTER — OFFICE VISIT (OUTPATIENT)
Dept: OPHTHALMOLOGY | Facility: CLINIC | Age: 64
End: 2019-03-28
Attending: OPHTHALMOLOGY
Payer: COMMERCIAL

## 2019-03-28 VITALS
HEART RATE: 79 BPM | WEIGHT: 179.2 LBS | HEIGHT: 67 IN | SYSTOLIC BLOOD PRESSURE: 128 MMHG | DIASTOLIC BLOOD PRESSURE: 79 MMHG | BODY MASS INDEX: 28.12 KG/M2

## 2019-03-28 DIAGNOSIS — H53.10 SUBJECTIVE VISUAL DISTURBANCE: Primary | ICD-10-CM

## 2019-03-28 DIAGNOSIS — G35 MS (MULTIPLE SCLEROSIS) (H): Primary | ICD-10-CM

## 2019-03-28 DIAGNOSIS — G35 MS (MULTIPLE SCLEROSIS) (H): ICD-10-CM

## 2019-03-28 LAB
ALT SERPL W P-5'-P-CCNC: 38 U/L (ref 0–50)
AST SERPL W P-5'-P-CCNC: 22 U/L (ref 0–45)
BASOPHILS # BLD AUTO: 0 10E9/L (ref 0–0.2)
BASOPHILS NFR BLD AUTO: 0.6 %
DIFFERENTIAL METHOD BLD: NORMAL
EOSINOPHIL # BLD AUTO: 0.1 10E9/L (ref 0–0.7)
EOSINOPHIL NFR BLD AUTO: 1.3 %
ERYTHROCYTE [DISTWIDTH] IN BLOOD BY AUTOMATED COUNT: 12.3 % (ref 10–15)
HCT VFR BLD AUTO: 42.8 % (ref 35–47)
HGB BLD-MCNC: 13.5 G/DL (ref 11.7–15.7)
IMM GRANULOCYTES # BLD: 0 10E9/L (ref 0–0.4)
IMM GRANULOCYTES NFR BLD: 0.2 %
LYMPHOCYTES # BLD AUTO: 1.6 10E9/L (ref 0.8–5.3)
LYMPHOCYTES NFR BLD AUTO: 34.2 %
MCH RBC QN AUTO: 28.8 PG (ref 26.5–33)
MCHC RBC AUTO-ENTMCNC: 31.5 G/DL (ref 31.5–36.5)
MCV RBC AUTO: 92 FL (ref 78–100)
MONOCYTES # BLD AUTO: 0.5 10E9/L (ref 0–1.3)
MONOCYTES NFR BLD AUTO: 9.4 %
NEUTROPHILS # BLD AUTO: 2.6 10E9/L (ref 1.6–8.3)
NEUTROPHILS NFR BLD AUTO: 54.3 %
NRBC # BLD AUTO: 0 10*3/UL
NRBC BLD AUTO-RTO: 0 /100
PLATELET # BLD AUTO: 218 10E9/L (ref 150–450)
RBC # BLD AUTO: 4.68 10E12/L (ref 3.8–5.2)
WBC # BLD AUTO: 4.8 10E9/L (ref 4–11)

## 2019-03-28 PROCEDURE — G0463 HOSPITAL OUTPT CLINIC VISIT: HCPCS | Mod: ZF

## 2019-03-28 PROCEDURE — 36415 COLL VENOUS BLD VENIPUNCTURE: CPT | Performed by: NURSE PRACTITIONER

## 2019-03-28 PROCEDURE — 92133 CPTRZD OPH DX IMG PST SGM ON: CPT | Mod: ZF | Performed by: OPHTHALMOLOGY

## 2019-03-28 PROCEDURE — 84460 ALANINE AMINO (ALT) (SGPT): CPT | Performed by: NURSE PRACTITIONER

## 2019-03-28 PROCEDURE — 92083 EXTENDED VISUAL FIELD XM: CPT | Mod: ZF | Performed by: OPHTHALMOLOGY

## 2019-03-28 PROCEDURE — 84450 TRANSFERASE (AST) (SGOT): CPT | Performed by: NURSE PRACTITIONER

## 2019-03-28 PROCEDURE — 85025 COMPLETE CBC W/AUTO DIFF WBC: CPT | Performed by: NURSE PRACTITIONER

## 2019-03-28 RX ORDER — PYRIDOXINE HCL (VITAMIN B6) 100 MG
TABLET ORAL
COMMUNITY

## 2019-03-28 RX ORDER — UBIDECARENONE 200 MG
CAPSULE ORAL
COMMUNITY
End: 2020-09-17

## 2019-03-28 ASSESSMENT — CONF VISUAL FIELD
OD_NORMAL: 1
METHOD: COUNTING FINGERS
OS_NORMAL: 1

## 2019-03-28 ASSESSMENT — TONOMETRY
OD_IOP_MMHG: 13
IOP_METHOD: ICARE
OS_IOP_MMHG: 13

## 2019-03-28 ASSESSMENT — REFRACTION_WEARINGRX
OS_HPRISM: 1 BI
OD_HPRISM: 1 BI
SPECS_TYPE: PAL
OD_VPRISM: 1 BD
OS_SPHERE: PLANO
OS_AXIS: 088
OD_ADD: +2.50
OD_SPHERE: +0.50
OS_CYLINDER: +0.50
OD_AXIS: 128
OS_ADD: +2.50
OD_CYLINDER: +1.00

## 2019-03-28 ASSESSMENT — VISUAL ACUITY
OS_CC: 20/30
CORRECTION_TYPE: GLASSES
OD_CC: 20/30
OD_CC+: -1
OS_CC+: +2
METHOD: SNELLEN - LINEAR

## 2019-03-28 ASSESSMENT — SLIT LAMP EXAM - LIDS: COMMENTS: NORMAL

## 2019-03-28 ASSESSMENT — EXTERNAL EXAM - LEFT EYE: OS_EXAM: NORMAL

## 2019-03-28 ASSESSMENT — EXTERNAL EXAM - RIGHT EYE: OD_EXAM: NORMAL

## 2019-03-28 ASSESSMENT — CUP TO DISC RATIO
OD_RATIO: 0.2
OS_RATIO: 0.2

## 2019-03-28 ASSESSMENT — MIFFLIN-ST. JEOR: SCORE: 1397.54

## 2019-03-28 NOTE — LETTER
"3/28/2019       RE: Cecile Mays  [address]  [facility] [address]     Dear Colleague,    Thank you for referring your patient, Cecile Mays, to the [facility]. Please see a copy of my visit note below.    [facility] OUTPATIENT MULTIPLE SCLEROSIS CLINIC VISIT NOTE      REASON FOR VISIT: Yue is a 63 year old right handed female who presents to the clinic today for regularly scheduled follow up of MS. She was most recently seen by Dr. Santoyo on 10/4/2018. She presents to the evaluation with her , Luis.     HISTORY OF PRESENT ILLNESS: The patient initially developed symptoms of demyelinating disease in 2016 when she developed left facial numbness.  MRI and CSF studies at that time were suggestive of demyelinating disease of the type seen in multiple sclerosis.  Formal MS diagnosis was confirmed by later radiologic accumulation of typical demyelinating lesions on MRI.  She was initially treated with glatiramer acetate, but due to radiologic progression on the medication, she was switched to the Rebif formulation of interferon Betaseron in beginning of 2017.        INTERVAL HISTORY SINCE LAST VISIT: Most recent visit with Dr. Santoyo was on 10/4/2018. Overall doing well since that appointment. No recent hospitalization or illness. Patient denies any new changes in vision, balance, strength or sensation suggestive of new relapse of multiple sclerosis since she was last seen here.    She currently takes Rebif 44 mcg subcutaneous three times a week. Injections are going well. She uses an autoinjector and rotates between 8 different sites. Denies any major side effects from medication except \"mild joint pain\" at night. She uses Tylenol and is helpful. In October, she had a normal blood count and normal AST, but ALT was mildly elevated at 64 (In 7/2017, it was 71).     Her residual symptoms include " "a \"Novocain wearing off sensation\" on the left side of her jaw/ face\" since the initial relapse in 2016. Also reports mild left leg weakness with long distance walking.     Other than \"dry eyes\", she denies any visual symptoms. She was evaluated by Dr. Fortune in Neuro-ophthalmology in 2/2019 and has a follow up appt with him today after this visit. No concerns with speech, swallowing, hearing. She is right handed. She reports intermittent \"tingling\" in both legs- below knees especially after long distance walking.     She carries diagnoses of depression and anxiety. She currently takes Zoloft 100 daily and reports that her mood is stable. She rates her fatigue as moderate. Not on any medications. She is able to manage it by taking naps during the day. She sleeps 8 hours at night. She recently started using Cannabis 600 mg oil at night to help her with sleep.     In regards to bladder symptoms, she has urinary urgency and incomplete emptying. She was started on Oxybutynin during her last visit. Today, patient reports that she felt like a \"dry sponge\" when she tried this medication for a week. Due to the side effect, she discontinued the use. She had frequent UTIs in the past, but none since fall 2018 when she started Cranberry supplement. She denies constipation or diarrhea.       PAST MEDICAL/SURGICAL HISTORY:   1. MS   2. Bilateral Lasik eye surgery  3. Anxiety  4. Depression  5. Seasonal allergy      FAMILY HISTORY: Negative for Multiple Sclerosis.     SOCIAL HISTORY: She is  and lives with her . She has 2 step children. She denies smoking, alcohol or recreational drug use. She used to work in construction as a . Stopped working in 2017.     9/2018  MRI Brain:  IMPRESSION: No change of juxtacortical, periventricular, and  infratentorial T2 FLAIR hyperintensities compatible with known  multiple sclerosis. No enhancement is present to suggest active  demyelination.       9/2018 MRI " "Cervical spine:  IMPRESSION:  Short-segment T2 hyperintensity within the cervical cord  at level of C2 is similar compared to 5/15/2017 compatible with known  multiple sclerosis. No new lesions are identified. No enhancement is  present to suggest active demyelination.       Vit D: 4000 international unit(s) daily. Her most recent level from 4/2018 was 69.     PHYSICAL EXAMINATION:  VITAL SIGNS: /79 (BP Location: Left arm, Patient Position: Chair, Cuff Size: Adult Regular)   Pulse 79   Ht 1.689 m (5' 6.5\")   Wt 81.3 kg (179 lb 3.2 oz)   LMP 02/05/2008   BMI 28.49 kg/m      MENTAL STATUS: Alert,awake and oriented times four.   CRANIAL NERVES: The pupils are equal, round and react to light and there is no Spencer Radha pupil. Funduscopic examination demonstrates no optic pallor, papilledema or retinal hemorrhage/exudate. Extraocular movements are intact with no internuclear ophthalmoplegia. No nystagmus. Facial strength is normal. Hearing is normal. Palate elevation and tongue protrusion are normal.   POWER: Strength is normal (5/5) in the following muscles/groups: Deltoids, biceps, triceps, wrist extensors, finger abductors, right hip flexors, knee flexors, foot dorsiflexors. Left hip flexors with mild weakness of 4+/5.   SENSORY: Intact to light touch except subjective report of \"Novocain wearing off sensation\" in left V2 distribuition.   REFLEXES: Reflexes are normal, present and symmetric at biceps, triceps, brachioradialis. Left knee and ankle reflexes are increased compared to the right.   MOTOR/CEREBELLAR: There are no tremors, myoclonus or other abnormal movements. Tone is within normal limits in the limbs. There is no appendicular ataxia on finger-to-nose testing and rapid alternating movements are normal in the extremities. There is no pronator drift. Romberg negative.   GAIT: Gait is narrow-based and steady and the patient is able to walk on heels, toes. Mildly impaired tandem gait. "       ASSESSMENT/ PLAN:   1. Multiple Sclerosis, clinically stable on Rebif: Initial symptoms of demyelinating disease in 2016 when she developed left facial numbness.  MRI and CSF studies at that time were suggestive of demyelinating disease of the type seen in multiple sclerosis. Treated with GA until beginning of 2017 when she had radiological progression of disease. Treatment was switched to Rebif at that time. Overall tolerates this medication well.     We will check cbc with diff and liver enzymes for Rebif monitoring. She will follow up with Dr. Santoyo in 6 months after brain imaging. Patient will call the clinic to schedule this appointment.     2. Vit D level: Continue 4000 international unit(s) daily.     3. Urinary symptoms: Completed a trial of Oxybutynin after her last visit in 10/2018, but had side effects. Currently manages with timed voiding.     4. Please contact us with questions or concerns.     I spent 55 minutes with this patient today, greater than 50% of which was spent in counseling and coordination of care.     (Chart documentation was completed in part with Dragon voice-recognition software. Even though reviewed, some grammatical, spelling, and word errors may remain.)      Again, thank you for allowing me to participate in the care of your patient.      Sincerely,    SANDHYA Mccray CNP

## 2019-03-28 NOTE — PROGRESS NOTES
"[facility] OUTPATIENT MULTIPLE SCLEROSIS CLINIC VISIT NOTE      REASON FOR VISIT: Yue is a 63 year old right handed female who presents to the clinic today for regularly scheduled follow up of MS. She was most recently seen by Dr. Santoyo on 10/4/2018. She presents to the evaluation with her , Luis.     HISTORY OF PRESENT ILLNESS: The patient initially developed symptoms of demyelinating disease in 2016 when she developed left facial numbness.  MRI and CSF studies at that time were suggestive of demyelinating disease of the type seen in multiple sclerosis.  Formal MS diagnosis was confirmed by later radiologic accumulation of typical demyelinating lesions on MRI.  She was initially treated with glatiramer acetate, but due to radiologic progression on the medication, she was switched to the Rebif formulation of interferon Betaseron in beginning of 2017.        INTERVAL HISTORY SINCE LAST VISIT: Most recent visit with Dr. Santoyo was on 10/4/2018. Overall doing well since that appointment. No recent hospitalization or illness. Patient denies any new changes in vision, balance, strength or sensation suggestive of new relapse of multiple sclerosis since she was last seen here.    She currently takes Rebif 44 mcg subcutaneous three times a week. Injections are going well. She uses an autoinjector and rotates between 8 different sites. Denies any major side effects from medication except \"mild joint pain\" at night. She uses Tylenol and is helpful. In October, she had a normal blood count and normal AST, but ALT was mildly elevated at 64 (In 7/2017, it was 71).     Her residual symptoms include a \"Novocain wearing off sensation\" on the left side of her jaw/ face\" since the initial relapse in 2016. Also reports mild left leg weakness with long distance walking.     Other than \"dry eyes\", she denies any visual symptoms. She was evaluated by Dr. Fortune in Neuro-ophthalmology in 2/2019 and has " "a follow up appt with him today after this visit. No concerns with speech, swallowing, hearing. She is right handed. She reports intermittent \"tingling\" in both legs- below knees especially after long distance walking.     She carries diagnoses of depression and anxiety. She currently takes Zoloft 100 daily and reports that her mood is stable. She rates her fatigue as moderate. Not on any medications. She is able to manage it by taking naps during the day. She sleeps 8 hours at night. She recently started using Cannabis 600 mg oil at night to help her with sleep.     In regards to bladder symptoms, she has urinary urgency and incomplete emptying. She was started on Oxybutynin during her last visit. Today, patient reports that she felt like a \"dry sponge\" when she tried this medication for a week. Due to the side effect, she discontinued the use. She had frequent UTIs in the past, but none since fall 2018 when she started Cranberry supplement. She denies constipation or diarrhea.       PAST MEDICAL/SURGICAL HISTORY:   1. MS   2. Bilateral Lasik eye surgery  3. Anxiety  4. Depression  5. Seasonal allergy      FAMILY HISTORY: Negative for Multiple Sclerosis.     SOCIAL HISTORY: She is  and lives with her . She has 2 step children. She denies smoking, alcohol or recreational drug use. She used to work in construction as a . Stopped working in 2017.     9/2018  MRI Brain:  IMPRESSION: No change of juxtacortical, periventricular, and  infratentorial T2 FLAIR hyperintensities compatible with known  multiple sclerosis. No enhancement is present to suggest active  demyelination.       9/2018 MRI Cervical spine:  IMPRESSION:  Short-segment T2 hyperintensity within the cervical cord  at level of C2 is similar compared to 5/15/2017 compatible with known  multiple sclerosis. No new lesions are identified. No enhancement is  present to suggest active demyelination.       Vit D: 4000 international " "unit(s) daily. Her most recent level from 4/2018 was 69.     PHYSICAL EXAMINATION:  VITAL SIGNS: /79 (BP Location: Left arm, Patient Position: Chair, Cuff Size: Adult Regular)   Pulse 79   Ht 1.689 m (5' 6.5\")   Wt 81.3 kg (179 lb 3.2 oz)   LMP 02/05/2008   BMI 28.49 kg/m     MENTAL STATUS: Alert,awake and oriented times four.   CRANIAL NERVES: The pupils are equal, round and react to light and there is no Spencer Radha pupil. Funduscopic examination demonstrates no optic pallor, papilledema or retinal hemorrhage/exudate. Extraocular movements are intact with no internuclear ophthalmoplegia. No nystagmus. Facial strength is normal. Hearing is normal. Palate elevation and tongue protrusion are normal.   POWER: Strength is normal (5/5) in the following muscles/groups: Deltoids, biceps, triceps, wrist extensors, finger abductors, right hip flexors, knee flexors, foot dorsiflexors. Left hip flexors with mild weakness of 4+/5.   SENSORY: Intact to light touch except subjective report of \"Novocain wearing off sensation\" in left V2 distribuition.   REFLEXES: Reflexes are normal, present and symmetric at biceps, triceps, brachioradialis. Left knee and ankle reflexes are increased compared to the right.   MOTOR/CEREBELLAR: There are no tremors, myoclonus or other abnormal movements. Tone is within normal limits in the limbs. There is no appendicular ataxia on finger-to-nose testing and rapid alternating movements are normal in the extremities. There is no pronator drift. Romberg negative.   GAIT: Gait is narrow-based and steady and the patient is able to walk on heels, toes. Mildly impaired tandem gait.       ASSESSMENT/ PLAN:   1. Multiple Sclerosis, clinically stable on Rebif: Initial symptoms of demyelinating disease in 2016 when she developed left facial numbness.  MRI and CSF studies at that time were suggestive of demyelinating disease of the type seen in multiple sclerosis. Treated with GA until beginning of " 2017 when she had radiological progression of disease. Treatment was switched to Rebif at that time. Overall tolerates this medication well.     We will check cbc with diff and liver enzymes for Rebif monitoring. She will follow up with Dr. Santoyo in 6 months after brain imaging. Patient will call the clinic to schedule this appointment.     2. Vit D level: Continue 4000 international unit(s) daily.     3. Urinary symptoms: Completed a trial of Oxybutynin after her last visit in 10/2018, but had side effects. Currently manages with timed voiding.     4. Please contact us with questions or concerns.     Xena Koenig CNP  [facility]          I spent 55 minutes with this patient today, greater than 50% of which was spent in counseling and coordination of care.     (Chart documentation was completed in part with Dragon voice-recognition software. Even though reviewed, some grammatical, spelling, and word errors may remain.)

## 2019-03-28 NOTE — PROGRESS NOTES
1. Subjective visual disturbance since 2016 in the context of established multiple sclerosis diagnosis.  Neuro-ophthalmologic exam shows normal motility and alignment. There are no efferent ocular motor abnormalities that correlate to patient's symptoms.  I believe her blurred vision in the right eye is what disrupts her binocular function.    2. Mildly suboptimal best corrected visual acuity in the right eye (20/30) of unclear etiology without evidence of a right optic neuropathy or retinopathy or irregular astimagmatism.  This may be dry eye related.  I do not see evidence this multiple sclerosis related.  Offered referral to cornea specialist for post laser in situ keratomileusis (LASIK) significant dry eye.  Patient will consider but did not wish to schedule today.    3. Refractive error- new glasses from last visit improves vision in the left eye to near 20/20 but right eye remains 20/30, she has not has that filled, recommended to have glasses updated    Cecile Mays is a pleasant 63 year old White female with a history of multiple sclerosis (diagnosed in 2016) and s/p LASIK who presents to my neuro-ophthalmology clinic today on referral from Dr. Mireles for problems focusing with binocular vision.    Please see my note from 2/6/19 for a detailed report.    Interval:  No significant change since the last visit  We had given her an updated refraction but she has not gotten that filled  Still finds it difficult to focus using both eyes but she denies any double vision  Eyes feel dry    On examination today, corrected visual acuity 20/30-1 on the right, 20/30+2 on the left with her old manifest refraction. Color plates are 10/11 in the right and 11/11 left. No afferent pupillary defect on my check. Slit lamp examination reveals surface dryness with left eye worse than right.    Visual field show generalized depression and are non reliable.. Retinal nerve fiber layer shows symmetric inferior  thinning in both eyes.    Cecile Mays is a 63 year old female with a history of MS presenting for follow up with subjective difficulty focusing with binocular vision without diplopia and no evidence of strabismus. The patient's history and examination today are more consistent with blurred vision in the right eye which I suspect is due to dry eye.    She had no evidence of nystagmus, strabismus, nor internuclear ophthalmoplegia which might be associated with a disorder of binocularity and no significant asymmetric retinal nerve fiber layer thinning of the right optic nerve more than left to implicate prior optic neuritis. She has significant complaints related to her dry eyes and I offered her a follow up with our cornea service. Patient states she wants to think about it and will call back to schedule appointment. I have not given her a specific follow up date but she can follow up with me in as needed.         Complete documentation of historical and exam elements from today's encounter can be found in the full encounter summary report (not reduplicated in this progress note).  I personally obtained the chief complaint(s) and history of present illness.  I confirmed and edited as necessary the review of systems, past medical/surgical history, family history, social history, and examination findings as documented by others; and I examined the patient myself.  I personally reviewed the relevant tests, images, and reports as documented above.  I formulated and edited as necessary the assessment and plan and discussed the findings and management plan with the patient and family.  I personally reviewed the ophthalmic test(s) associated with this encounter, agree with the interpretation(s) as documented by the resident/fellow, and have edited the corresponding report(s) as necessary.     MD Sky Vargas MD  PGY-3 Ophthalmology Resident  [phone]

## 2019-03-28 NOTE — LETTER
2019    RE: Cecile Mays  : 1955  MRN: [id number]    Dear Providers,    I saw our mutual patient, Cecile Mays, in follow-up in my clinic recently.  After a thorough neuro-ophthalmic history and examination, I came to the following conclusions:     1. Subjective visual disturbance since 2016 in the context of established multiple sclerosis diagnosis.  Neuro-ophthalmologic exam shows normal motility and alignment. There are no efferent ocular motor abnormalities that correlate to patient's symptoms.  I believe her blurred vision in the right eye is what disrupts her binocular function.    2. Mildly suboptimal best corrected visual acuity in the right eye (20/30) of unclear etiology without evidence of a right optic neuropathy or retinopathy or irregular astimagmatism.  This may be dry eye related.  I do not see evidence this multiple sclerosis related.  Offered referral to cornea specialist for post laser in situ keratomileusis (LASIK) significant dry eye.  Patient will consider but did not wish to schedule today.    3. Refractive error- new glasses from last visit improves vision in the left eye to near 20/20 but right eye remains 20/30, she has not has that filled, recommended to have glasses updated    Cecile Mays is a pleasant 63 year old White female with a history of multiple sclerosis (diagnosed in 2016) and s/p LASIK who presents to my neuro-ophthalmology clinic today on referral from Dr. Mireles for problems focusing with binocular vision.    Please see my note from 19 for a detailed report.    Interval:  No significant change since the last visit  We had given her an updated refraction but she has not gotten that filled  Still finds it difficult to focus using both eyes but she denies any double vision  Eyes feel dry    On examination today, corrected visual acuity 20/30-1 on the right, 20/30+2 on the left with her old manifest refraction.  Color plates are 10/11 in the right and 11/11 left. No afferent pupillary defect on my check. Slit lamp examination reveals surface dryness with left eye worse than right.    Visual field show generalized depression and are non reliable.. Retinal nerve fiber layer shows symmetric inferior thinning in both eyes.    Cecile Mays is a 63 year old female with a history of MS presenting for follow up with subjective difficulty focusing with binocular vision without diplopia and no evidence of strabismus. The patient's history and examination today are more consistent with blurred vision in the right eye which I suspect is due to dry eye.    She had no evidence of nystagmus, strabismus, nor internuclear ophthalmoplegia which might be associated with a disorder of binocularity and no significant asymmetric retinal nerve fiber layer thinning of the right optic nerve more than left to implicate prior optic neuritis. She has significant complaints related to her dry eyes and I offered her a follow up with our cornea service. Patient states she wants to think about it and will call back to schedule appointment. I have not given her a specific follow up date but she can follow up with me in as needed.    For further exam details, please feel free to contact our office for additional records.  If you wish to contact me regarding this patient please email me at [email or web address] or give my clinic a call to arrange a phone conversation.    Sincerely,    Kevon Fortune MD  , Neuro-Ophthalmology and Adult Strabismus Surgery  The Nimo Reid Chair in Neuro-Ophthalmology  Department of Ophthalmology and Visual Neurosciences  [facility]

## 2019-03-28 NOTE — NURSING NOTE
"Chief Complaint(s) and History of Present Illness(es)     Follow Up     In both eyes.  Associated symptoms include dryness.  Negative for eye pain, redness and tearing.              Comments     Pt here for a 1 month f/u for MS. Pt notes vision is about the same since last visit. Pt notes she does not have double vision, but BE just \"don't work together\". Pt still notes LE feels dry.    Odessa Winters, COMT 11:01 AM March 28, 2019                   "

## 2019-03-28 NOTE — PATIENT INSTRUCTIONS
1. Labs today.     2. Continue Rebif three times a week.    3. Continue vit D 4000 international unit(s) daily.     4. MRI Brain in 6 months.     5. F/u with Dr. Santoyo after imaging.

## 2019-04-01 ENCOUNTER — MYC MEDICAL ADVICE (OUTPATIENT)
Dept: FAMILY MEDICINE | Facility: CLINIC | Age: 64
End: 2019-04-01

## 2019-04-02 ENCOUNTER — HOSPITAL ENCOUNTER (OUTPATIENT)
Dept: MAMMOGRAPHY | Facility: CLINIC | Age: 64
Discharge: HOME | End: 2019-04-02
Attending: FAMILY MEDICINE | Admitting: FAMILY MEDICINE
Payer: COMMERCIAL

## 2019-04-02 DIAGNOSIS — Z12.31 VISIT FOR SCREENING MAMMOGRAM: ICD-10-CM

## 2019-04-02 PROCEDURE — 77063 BREAST TOMOSYNTHESIS BI: CPT

## 2019-04-12 ENCOUNTER — ALLIED HEALTH/NURSE VISIT (OUTPATIENT)
Dept: FAMILY MEDICINE | Facility: CLINIC | Age: 64
End: 2019-04-12
Payer: COMMERCIAL

## 2019-04-12 DIAGNOSIS — Z23 NEED FOR SHINGLES VACCINE: ICD-10-CM

## 2019-04-12 DIAGNOSIS — Z23 NEED FOR VACCINATION: Primary | ICD-10-CM

## 2019-04-12 PROCEDURE — 90750 HZV VACC RECOMBINANT IM: CPT

## 2019-04-12 PROCEDURE — 90471 IMMUNIZATION ADMIN: CPT

## 2019-04-12 PROCEDURE — 99207 ZZC NO CHARGE NURSE ONLY: CPT

## 2019-04-24 ENCOUNTER — OFFICE VISIT (OUTPATIENT)
Dept: OPHTHALMOLOGY | Facility: CLINIC | Age: 64
End: 2019-04-24
Attending: OPHTHALMOLOGY
Payer: COMMERCIAL

## 2019-04-24 DIAGNOSIS — H04.123 DRY EYE SYNDROME OF BOTH EYES: Primary | ICD-10-CM

## 2019-04-24 DIAGNOSIS — G35 MULTIPLE SCLEROSIS (H): ICD-10-CM

## 2019-04-24 DIAGNOSIS — Z98.890 STATUS POST LASIK SURGERY: ICD-10-CM

## 2019-04-24 PROCEDURE — 68761 CLOSE TEAR DUCT OPENING: CPT | Mod: ZF | Performed by: OPHTHALMOLOGY

## 2019-04-24 PROCEDURE — G0463 HOSPITAL OUTPT CLINIC VISIT: HCPCS | Mod: ZF

## 2019-04-24 ASSESSMENT — VISUAL ACUITY
OD_CC: 20/40
METHOD: SNELLEN - LINEAR
OS_CC: 20/40
CORRECTION_TYPE: GLASSES
OS_CC+: +2

## 2019-04-24 ASSESSMENT — REFRACTION_WEARINGRX
SPECS_TYPE: PAL
OS_AXIS: 093
OD_AXIS: 120
OS_CYLINDER: +0.25
OS_SPHERE: PLANO
OD_SPHERE: +0.25
OD_ADD: +2.50
OS_ADD: +2.50
OD_CYLINDER: +0.50

## 2019-04-24 ASSESSMENT — SLIT LAMP EXAM - LIDS: COMMENTS: NORMAL

## 2019-04-24 ASSESSMENT — CONF VISUAL FIELD
OD_NORMAL: 1
METHOD: COUNTING FINGERS
OS_NORMAL: 1

## 2019-04-24 ASSESSMENT — CUP TO DISC RATIO
OS_RATIO: 0.2
OD_RATIO: 0.2

## 2019-04-24 ASSESSMENT — EXTERNAL EXAM - LEFT EYE: OS_EXAM: NORMAL

## 2019-04-24 ASSESSMENT — TONOMETRY
IOP_METHOD: ICARE
OS_IOP_MMHG: 12
OD_IOP_MMHG: 13

## 2019-04-24 ASSESSMENT — EXTERNAL EXAM - RIGHT EYE: OD_EXAM: NORMAL

## 2019-04-24 NOTE — PROGRESS NOTES
CC: 63yoF h/o MS referred by Dr. Fortune for dry eye    HPI: Referred by Dr. Fortune for dry eye. H/o LASIK in 2005. States she's had issues with dry eye since LASIK. Feels LE >> RE dry. Has had punctal plug placed a couple of years ago in right lower lid. Feels the two eyes don't work well together. Denies diplopia currently, hasn't had for a while. Occasionally if it occurs, it is relieved by resting eyes. Had been on restasis for a while, feels it did not help.     OphthoMeds:  PFATs at least q1h BE    POH:  S/p myopic LASIK BE 2005      PMH:  Multiple sclerosis    A&P    1. Dry eye syndrome  -h/o LASIK  -cont aggressive PFATs  -start WC  -no improvement with restasis in past  -trial of xiidra BID OU  -LLL collagen punctal plug 0.2mm placed today, consider 0.3mm vs. Silicone next time (per history previous provider was unable to place silicone plug LLL)     2. Diplopia  -currently asymptomatic  -no motilty or alignment issues per patient    3. H/o LASIK BE    4. Refractive Error  -new MRx given today with increased add in bifocal      Melva Mcclendon MD  PGY-5, Cornea Fellow    Attending Physician Attestation:  Complete documentation of historical and exam elements from today's encounter can be found in the full encounter summary report (not reduplicated in this progress note).  I personally obtained the chief complaint(s) and history of present illness.  I confirmed and edited as necessary the review of systems, past medical/surgical history, family history, social history, and examination findings as documented by others; and I examined the patient myself.  I personally reviewed the relevant tests, images, and reports as documented above.  I formulated and edited as necessary the assessment and plan and discussed the findings and management plan with the patient and family. I was present for the key portions of the procedure and immediately available for the remainder. - Reynaldo Ho MD    I  personally spent great than 40min with the patient, of which >50% of the time was spent face to face with the patient, counseling and coordinating care with the patient. We discussed the complexity of her diagnosis, the need for further information prior to proceeding with yet another surgery, and the unknown prognosis for the patient at this time.    Reynaldo Ho MD

## 2019-04-24 NOTE — LETTER
4/24/2019        RE: Cecile Mays  [address]  [facility] [address]     Dear Colleague,    Thank you for referring your patient, eCcile Mays, to the EYE CLINIC at [facility]. Please see a copy of my visit note below.    CC: 63yoF h/o MS referred by Dr. Fortune for dry eye    HPI: Referred by Dr. Fortune for dry eye. H/o LASIK in 2005. States she's had issues with dry eye since LASIK. Feels LE >> RE dry. Has had punctal plug placed a couple of years ago in right lower lid. Feels the two eyes don't work well together. Denies diplopia currently, hasn't had for a while. Occasionally if it occurs, it is relieved by resting eyes. Had been on restasis for a while, feels it did not help.     OphthoMeds:  PFATs at least q1h BE    POH:  S/p myopic LASIK BE 2005      PMH:  Multiple sclerosis    A&P    1. Dry eye syndrome  -h/o LASIK  -cont aggressive PFATs  -start WC  -no improvement with restasis in past  -trial of xiidra BID OU  -LLL collagen punctal plug 0.2mm placed today, consider 0.3mm vs. Silicone next time (per history previous provider was unable to place silicone plug LLL)     2. Diplopia  -currently asymptomatic  -no motilty or alignment issues per patient    3. H/o LASIK BE    4. Refractive Error  -new MRx given today with increased add in bifocal      Again, thank you for allowing me to participate in the care of your patient.      Sincerely,    Reynaldo Ho MD

## 2019-04-24 NOTE — NURSING NOTE
Chief Complaint(s) and History of Present Illness(es)     Dry Eye(s)     In both eyes.  Associated signs and symptoms include irritation.  Negative for eye pain, redness and tearing.              Comments     Pt is here for an evaluation for dry eyes per Dr. Fortune. Dr. Fortune feels the pt's dry eyes are having a big part in pt's vision. Pt c/o glare bothers pt a lot and overcast days are bad as well. Pt is using the Refresh liquid gel at bedtime BE and Refresh PF drops 4-5 times day. Pt c/o the LE is the worst eye. Pt has a punctal plug in RE but not in LE due to not being able to get it in the LE. Pt feels the two eyes are fighting each other (vision wise). Pt c/o BE feel strained right now.     Odessa Winters [facility] 12:27 PM April 24, 2019

## 2019-05-14 DIAGNOSIS — F33.0 MAJOR DEPRESSIVE DISORDER, RECURRENT EPISODE, MILD (H): ICD-10-CM

## 2019-05-16 RX ORDER — SERTRALINE HYDROCHLORIDE 100 MG/1
100 TABLET, FILM COATED ORAL DAILY
Qty: 30 TABLET | Refills: 0 | Status: SHIPPED | OUTPATIENT
Start: 2019-05-16 | End: 2019-06-25

## 2019-05-16 NOTE — TELEPHONE ENCOUNTER
Called patient and she made appt for June 21st, first available appt with Dr Pringle. Patient states she needs this filled, she only has a few pills left.

## 2019-05-16 NOTE — TELEPHONE ENCOUNTER
"Requested Prescriptions   Pending Prescriptions Disp Refills     sertraline (ZOLOFT) 100 MG tablet [Pharmacy Med Name: SERTRALINE HCL 100MG TABS] 90 tablet 1     Sig: TAKE ONE TABLET BY MOUTH ONCE DAILY   Last Written Prescription Date:  10/31/18  Last Fill Quantity: 90,  # refills: 1   Last office visit: 4/12/2019 with prescribing provider:  10/31/18 for this Dx   Future Office Visit:        SSRIs Protocol Failed - 5/14/2019  8:54 AM        Failed - PHQ-9 score less than 5 in past 6 months     Please review last PHQ-9 score.   PHQ-9 SCORE 1/8/2018 10/31/2018 3/19/2019   PHQ-9 Total Score - - -   PHQ-9 Total Score MyChart - - 5 (Mild depression)   PHQ-9 Total Score 6 16 5             Passed - Medication is active on med list        Passed - Patient is age 18 or older        Passed - No active pregnancy on record        Passed - No positive pregnancy test in last 12 months        Passed - Recent (6 mo) or future (30 days) visit within the authorizing provider's specialty     Patient had office visit in the last 6 months or has a visit in the next 30 days with authorizing provider or within the authorizing provider's specialty.  See \"Patient Info\" tab in inbasket, or \"Choose Columns\" in Meds & Orders section of the refill encounter.            Routing refill request to provider for review/approval because:  Labs out of range:  PHQ-9  T'd up 1 month for provider review.    Will forward to schedulers to schedule patient for OV.  Kady Millan RN            "

## 2019-06-21 ENCOUNTER — OFFICE VISIT (OUTPATIENT)
Dept: FAMILY MEDICINE | Facility: CLINIC | Age: 64
End: 2019-06-21
Payer: COMMERCIAL

## 2019-06-21 ENCOUNTER — MYC MEDICAL ADVICE (OUTPATIENT)
Dept: FAMILY MEDICINE | Facility: CLINIC | Age: 64
End: 2019-06-21

## 2019-06-21 VITALS
OXYGEN SATURATION: 97 % | RESPIRATION RATE: 16 BRPM | TEMPERATURE: 97.5 F | BODY MASS INDEX: 28.14 KG/M2 | HEART RATE: 90 BPM | WEIGHT: 177 LBS | SYSTOLIC BLOOD PRESSURE: 118 MMHG | DIASTOLIC BLOOD PRESSURE: 82 MMHG

## 2019-06-21 DIAGNOSIS — Z71.89 ADVANCED DIRECTIVES, COUNSELING/DISCUSSION: ICD-10-CM

## 2019-06-21 DIAGNOSIS — F43.22 ADJUSTMENT DISORDER WITH ANXIOUS MOOD: ICD-10-CM

## 2019-06-21 DIAGNOSIS — F33.0 MAJOR DEPRESSIVE DISORDER, RECURRENT EPISODE, MILD (H): ICD-10-CM

## 2019-06-21 DIAGNOSIS — G35 MULTIPLE SCLEROSIS (H): Primary | ICD-10-CM

## 2019-06-21 DIAGNOSIS — R05.8 ALLERGIC COUGH: Primary | ICD-10-CM

## 2019-06-21 PROBLEM — G47.01 INSOMNIA DUE TO MEDICAL CONDITION: Status: RESOLVED | Noted: 2018-01-08 | Resolved: 2019-06-21

## 2019-06-21 PROCEDURE — 99214 OFFICE O/P EST MOD 30 MIN: CPT | Performed by: FAMILY MEDICINE

## 2019-06-21 RX ORDER — MONTELUKAST SODIUM 10 MG/1
10 TABLET ORAL AT BEDTIME
Qty: 30 TABLET | Refills: 5 | Status: SHIPPED | OUTPATIENT
Start: 2019-06-21 | End: 2022-04-01

## 2019-06-21 ASSESSMENT — PATIENT HEALTH QUESTIONNAIRE - PHQ9: SUM OF ALL RESPONSES TO PHQ QUESTIONS 1-9: 2

## 2019-06-21 ASSESSMENT — PAIN SCALES - GENERAL: PAINLEVEL: NO PAIN (0)

## 2019-06-21 NOTE — PATIENT INSTRUCTIONS
You have a good plan and keep working it.   Montelukast may help allergies with few side effects. If not better after a couple weeks, you can stop it.  Otherwise it can be used as long as needed, stopped and started.

## 2019-06-21 NOTE — PROGRESS NOTES
Subjective     Cecile Mays is a 63 year old female who presents to clinic today for the following health issues:    HPI   Depression and Anxiety Follow-Up    How are you doing with your depression since your last visit? No change    How are you doing with your anxiety since your last visit?  No change    Are you having other symptoms that might be associated with depression or anxiety? No    Have you had a significant life event? No     Do you have any concerns with your use of alcohol or other drugs? No    Social History     Tobacco Use     Smoking status: Never Smoker     Smokeless tobacco: Never Used   Substance Use Topics     Alcohol use: No     Alcohol/week: 0.0 oz     Drug use: No     PHQ 1/8/2018 10/31/2018 3/19/2019   PHQ-9 Total Score 6 16 5   Q9: Thoughts of better off dead/self-harm past 2 weeks Not at all More than half the days Not at all     LEATHA-7 SCORE 1/27/2017 1/8/2018 10/31/2018   Total Score 13 13 11     No flowsheet data found.  Completed at visit today.    Suicide Assessment Five-step Evaluation and Treatment (SAFE-T)    Amount of exercise or physical activity: None    Problems taking medications regularly: No    Medication side effects: none    Diet: regular (no restrictions)      Patient believes she is doing quite well with her chronic illnesses and her 's chronic illnesses managing mood and depression.  She mostly is upbeat.  She struggles most with maintaining activity at a slower pace than her brain would sometimes tell her to go.    Patient Active Problem List   Diagnosis     Benign neoplasm of eyelid     Urge incontinence     Allergic rhinitis     Pain in limb     Hyperlipidemia LDL goal <130     Advanced directives, counseling/discussion     Pain in left hip     Flank pain     Gastroesophageal reflux disease with esophagitis     Adjustment disorder with anxious mood     Sacroiliac joint dysfunction     Multiple sclerosis (H)     Segmental dysfunction of sacral region      Segmental dysfunction of lumbar region     Segmental dysfunction of thoracic region     Lumbago     Insomnia due to medical condition     Major depressive disorder, recurrent episode, mild (H)     Past Surgical History:   Procedure Laterality Date     C LIGATE FALLOPIAN TUBE       COLONOSCOPY  2014    Procedure: COLONOSCOPY;  Colonoscopy;  Surgeon: Jose Martin Dye MD;  Location:  GI       Social History     Tobacco Use     Smoking status: Never Smoker     Smokeless tobacco: Never Used   Substance Use Topics     Alcohol use: No     Alcohol/week: 0.0 oz     Family History   Problem Relation Age of Onset     Cancer Father          at age 70     Lipids Father      Hypertension Father      Diabetes Father         Adult Onset     Heart Disease Mother         low blood pressure, passed away 2004 lung cancer     Cancer Mother         Lung     Diabetes Mother         Adult Onset     Respiratory Sister         uses inhalers     Cancer Sister         Lung     Heart Disease Sister         Hx Rhumatic fever         BP Readings from Last 3 Encounters:   19 118/82   19 128/79   18 122/72    Wt Readings from Last 3 Encounters:   19 80.3 kg (177 lb)   19 81.3 kg (179 lb 3.2 oz)   10/31/18 79 kg (174 lb 1.6 oz)                    MS is doing well.  She is managed by neurology.  She has a couple questions about injection sites where there is little redness in the skin at one spot and a bruise on another.  Reviewed and updated as needed this visit by Provider         Review of Systems   ROS COMP: Constitutional, HEENT, cardiovascular, pulmonary, gi and gu systems are negative, except as otherwise noted.  Some imbalance noted but she manages this  Having dry cough which she relates to allergy.  Is been present for a number of weeks and only is troublesome at times.  She is using inhalers     Objective    /82   Pulse 90   Temp 97.5  F (36.4  C) (Temporal)   Resp 16   Wt 80.3  kg (177 lb)   LMP 02/05/2008   SpO2 97%   BMI 28.14 kg/m    Body mass index is 28.14 kg/m .  Physical Exam   GENERAL: healthy, alert and no distress  EYES: Eyes grossly normal to inspection, PERRL and conjunctivae and sclerae normal  HENT: ear canals and TM's normal, nose and mouth without ulcers or lesions  NECK: no adenopathy, no asymmetry, masses, or scars and thyroid normal to palpation  RESP: lungs clear to auscultation - no rales, rhonchi or wheezes and she did have frequent dry cough  CV: regular rate and rhythm, normal S1 S2, no S3 or S4, no murmur, click or rub, no peripheral edema and peripheral pulses strong  ABDOMEN: soft, nontender, no hepatosplenomegaly, no masses and bowel sounds normal  MS: no gross musculoskeletal defects noted, no edema  SKIN: no suspicious lesions or rashes and slight bruising one side of the abdomen and slight redness on the skin which does not appear infected surgical about 2 cm left.  Many other sites are unremarkable  NEURO: Normal strength and tone, mentation intact and speech normal  PSYCH: mentation appears normal, affect normal/bright  LYMPH: no cervical, supraclavicular, axillary, or inguinal adenopathy    Diagnostic Test Results:  Labs reviewed in Epic        Assessment & Plan     1. Major depressive disorder, recurrent episode, mild (H)  I have known this woman for many years and indeed all things considered her mood and depression is doing very well.  She manages to control her anxiety quite well additionally.  We talked about some self-management techniques.  She is receiving acupuncture which has seemed to have helped.    2. Multiple sclerosis (H)  Following with neurology with very few symptoms today.  We will continue to manage    3. Adjustment disorder with anxious mood  As above with #1    4. Advanced directives, counseling/discussion  Patient is uncertain if she has filled out an advanced directive.  She does not want major aggressive treatment if she is not  "able to speak for herself and has no meaningful chance for good recovery.  Currently she would like to be full code.       BMI:   Estimated body mass index is 28.14 kg/m  as calculated from the following:    Height as of 3/28/19: 1.689 m (5' 6.5\").    Weight as of this encounter: 80.3 kg (177 lb).   Weight management plan: Discussed healthy diet and exercise guidelines        Patient Instructions   You have a good plan and keep working it.   Montelukast may help allergies with few side effects. If not better after a couple weeks, you can stop it.  Otherwise it can be used as long as needed, stopped and started.       Return in about 6 months (around 12/21/2019) for neuro/ms.  Time spent with greater than 50% spent in discussion, making the plan, or filling out paperwork with patient for illness/treatments was 25 minutes or more.    Tommy Pringle MD  [facility]      "

## 2019-06-25 DIAGNOSIS — F33.0 MAJOR DEPRESSIVE DISORDER, RECURRENT EPISODE, MILD (H): ICD-10-CM

## 2019-06-27 RX ORDER — SERTRALINE HYDROCHLORIDE 100 MG/1
100 TABLET, FILM COATED ORAL DAILY
Qty: 30 TABLET | Refills: 5 | Status: SHIPPED | OUTPATIENT
Start: 2019-06-27 | End: 2019-12-23

## 2019-06-27 NOTE — TELEPHONE ENCOUNTER
"Requested Prescriptions   Pending Prescriptions Disp Refills     sertraline (ZOLOFT) 100 MG tablet [Pharmacy Med Name: SERTRALINE HCL 100MG TABS] 30 tablet 0     Sig: TAKE ONE TABLET BY MOUTH ONCE DAILY *APPT NEEDED FOR FURTHER REFILLS*   Last Written Prescription Date:  5/16/2019  Last Fill Quantity: 30,  # refills: 0   Last office visit: 6/21/2019 with Pringle  Future Office Visit:        SSRIs Protocol Passed - 6/25/2019  8:13 PM        Passed - PHQ-9 score less than 5 in past 6 months     Please review last PHQ-9 score.   PHQ-9 score:    PHQ-9 SCORE 6/21/2019   PHQ-9 Total Score -   PHQ-9 Total Score MyChart -   PHQ-9 Total Score 2           Passed - Medication is active on med list        Passed - Patient is age 18 or older        Passed - No active pregnancy on record        Passed - No positive pregnancy test in last 12 months        Passed - Recent (6 mo) or future (30 days) visit within the authorizing provider's specialty     Patient had office visit in the last 6 months or has a visit in the next 30 days with authorizing provider or within the authorizing provider's specialty.  See \"Patient Info\" tab in inbasket, or \"Choose Columns\" in Meds & Orders section of the refill encounter.          Prescription approved per [facility].  Zoila Cruz RN      "

## 2019-07-02 ENCOUNTER — ALLIED HEALTH/NURSE VISIT (OUTPATIENT)
Dept: FAMILY MEDICINE | Facility: OTHER | Age: 64
End: 2019-07-02
Payer: COMMERCIAL

## 2019-07-02 DIAGNOSIS — Z23 NEED FOR VACCINATION: Primary | ICD-10-CM

## 2019-07-02 PROCEDURE — 90471 IMMUNIZATION ADMIN: CPT

## 2019-07-02 PROCEDURE — 90750 HZV VACC RECOMBINANT IM: CPT

## 2019-07-02 NOTE — PROGRESS NOTES
Screening Questionnaire for Adult Immunization    Are you sick today?   No   Do you have allergies to medications, food, a vaccine component or latex?   No   Have you ever had a serious reaction after receiving a vaccination?   No   Do you have a long-term health problem with heart disease, lung disease, asthma, kidney disease, metabolic disease (e.g. diabetes), anemia, or other blood disorder?   No   Do you have cancer, leukemia, HIV/AIDS, or any other immune system problem?   Yes- MS   In the past 3 months, have you taken medications that affect  your immune system, such as prednisone, other steroids, or anticancer drugs; drugs for the treatment of rheumatoid arthritis, Crohn s disease, or psoriasis; or have you had radiation treatments?   No   Have you had a seizure, or a brain or other nervous system problem?   No   During the past year, have you received a transfusion of blood or blood     products, or been given immune (gamma) globulin or antiviral drug?   No   For women: Are you pregnant or is there a chance you could become        pregnant during the next month?   No   Have you received any vaccinations in the past 4 weeks?   No     Immunization questionnaire was positive for at least one answer.  Notified Dr. Geoffrey funes to give.        Per orders of Dr. Pringle, injection of Shingrix given by Lin Sawyer. Patient instructed to remain in clinic for 15 minutes afterwards, and to report any adverse reaction to me immediately.       Screening performed by Lin Sawyer on 7/2/2019 at 9:11 AM.

## 2019-07-18 DIAGNOSIS — J98.01 ACUTE BRONCHOSPASM: ICD-10-CM

## 2019-07-18 RX ORDER — ALBUTEROL SULFATE 90 UG/1
2 AEROSOL, METERED RESPIRATORY (INHALATION) EVERY 6 HOURS PRN
Qty: 1 INHALER | Refills: 1 | Status: SHIPPED | OUTPATIENT
Start: 2019-07-18 | End: 2024-07-26

## 2019-07-18 NOTE — TELEPHONE ENCOUNTER
"Requested Prescriptions   Pending Prescriptions Disp Refills     albuterol (PROAIR HFA/PROVENTIL HFA/VENTOLIN HFA) 108 (90 Base) MCG/ACT inhaler 1 Inhaler 1     Sig: Inhale 2 puffs into the lungs every 6 hours as needed for shortness of breath / dyspnea or wheezing   Last Written Prescription Date:  2/5/2018  Last Fill Quantity: 1 inhaler,  # refills: 1   Last office visit: 6/21/2019 with prescribing provider:     Future Office Visit:        Asthma Maintenance Inhalers - Anticholinergics Passed - 7/18/2019  3:17 PM        Passed - Patient is age 12 years or older        Passed - Recent (12 mo) or future (30 days) visit within the authorizing provider's specialty     Patient had office visit in the last 12 months or has a visit in the next 30 days with authorizing provider or within the authorizing provider's specialty.  See \"Patient Info\" tab in inbasket, or \"Choose Columns\" in Meds & Orders section of the refill encounter.              Passed - Medication is active on med list      Prescription approved per [facility] Refill Protocol.  Zoila Cruz RN      "

## 2019-08-27 ENCOUNTER — HOSPITAL ENCOUNTER (OUTPATIENT)
Dept: MRI IMAGING | Facility: CLINIC | Age: 64
Discharge: HOME | End: 2019-08-27
Attending: NURSE PRACTITIONER | Admitting: NURSE PRACTITIONER
Payer: COMMERCIAL

## 2019-08-27 DIAGNOSIS — G35 MS (MULTIPLE SCLEROSIS) (H): ICD-10-CM

## 2019-08-27 PROCEDURE — 25500064 ZZH RX 255 OP 636: Performed by: NURSE PRACTITIONER

## 2019-08-27 PROCEDURE — A9585 GADOBUTROL INJECTION: HCPCS | Performed by: NURSE PRACTITIONER

## 2019-08-27 PROCEDURE — 70553 MRI BRAIN STEM W/O & W/DYE: CPT

## 2019-08-27 RX ORDER — GADOBUTROL 604.72 MG/ML
7.5 INJECTION INTRAVENOUS ONCE
Status: COMPLETED | OUTPATIENT
Start: 2019-08-27 | End: 2019-08-27

## 2019-08-27 RX ADMIN — GADOBUTROL 7.5 ML: 604.72 INJECTION INTRAVENOUS at 08:13

## 2019-09-19 ENCOUNTER — OFFICE VISIT (OUTPATIENT)
Dept: NEUROLOGY | Facility: CLINIC | Age: 64
End: 2019-09-19
Attending: PSYCHIATRY & NEUROLOGY
Payer: COMMERCIAL

## 2019-09-19 VITALS
SYSTOLIC BLOOD PRESSURE: 150 MMHG | HEIGHT: 67 IN | BODY MASS INDEX: 27.89 KG/M2 | DIASTOLIC BLOOD PRESSURE: 83 MMHG | HEART RATE: 65 BPM | WEIGHT: 177.7 LBS

## 2019-09-19 DIAGNOSIS — G35 MS (MULTIPLE SCLEROSIS) (H): ICD-10-CM

## 2019-09-19 DIAGNOSIS — R26.9 GAIT DISTURBANCE: ICD-10-CM

## 2019-09-19 DIAGNOSIS — G35 MS (MULTIPLE SCLEROSIS) (H): Primary | ICD-10-CM

## 2019-09-19 DIAGNOSIS — R39.15 URINARY URGENCY: ICD-10-CM

## 2019-09-19 LAB
ALBUMIN SERPL-MCNC: 3.6 G/DL (ref 3.4–5)
ALP SERPL-CCNC: 129 U/L (ref 40–150)
ALT SERPL W P-5'-P-CCNC: 34 U/L (ref 0–50)
AST SERPL W P-5'-P-CCNC: 19 U/L (ref 0–45)
BASOPHILS # BLD AUTO: 0.1 10E9/L (ref 0–0.2)
BASOPHILS NFR BLD AUTO: 0.8 %
BILIRUB DIRECT SERPL-MCNC: 0.1 MG/DL (ref 0–0.2)
BILIRUB SERPL-MCNC: 0.4 MG/DL (ref 0.2–1.3)
DEPRECATED CALCIDIOL+CALCIFEROL SERPL-MC: 66 UG/L (ref 20–75)
DIFFERENTIAL METHOD BLD: NORMAL
EOSINOPHIL # BLD AUTO: 0.1 10E9/L (ref 0–0.7)
EOSINOPHIL NFR BLD AUTO: 2 %
ERYTHROCYTE [DISTWIDTH] IN BLOOD BY AUTOMATED COUNT: 12.4 % (ref 10–15)
HCT VFR BLD AUTO: 43 % (ref 35–47)
HGB BLD-MCNC: 13.7 G/DL (ref 11.7–15.7)
IMM GRANULOCYTES # BLD: 0 10E9/L (ref 0–0.4)
IMM GRANULOCYTES NFR BLD: 0.2 %
LYMPHOCYTES # BLD AUTO: 1.4 10E9/L (ref 0.8–5.3)
LYMPHOCYTES NFR BLD AUTO: 23.6 %
MCH RBC QN AUTO: 29.9 PG (ref 26.5–33)
MCHC RBC AUTO-ENTMCNC: 31.9 G/DL (ref 31.5–36.5)
MCV RBC AUTO: 94 FL (ref 78–100)
MONOCYTES # BLD AUTO: 0.7 10E9/L (ref 0–1.3)
MONOCYTES NFR BLD AUTO: 12 %
NEUTROPHILS # BLD AUTO: 3.6 10E9/L (ref 1.6–8.3)
NEUTROPHILS NFR BLD AUTO: 61.4 %
NRBC # BLD AUTO: 0 10*3/UL
NRBC BLD AUTO-RTO: 0 /100
PLATELET # BLD AUTO: 229 10E9/L (ref 150–450)
PROT SERPL-MCNC: 7.3 G/DL (ref 6.8–8.8)
RBC # BLD AUTO: 4.58 10E12/L (ref 3.8–5.2)
TSH SERPL DL<=0.005 MIU/L-ACNC: 1.46 MU/L (ref 0.4–4)
WBC # BLD AUTO: 5.9 10E9/L (ref 4–11)

## 2019-09-19 PROCEDURE — 85025 COMPLETE CBC W/AUTO DIFF WBC: CPT | Performed by: PSYCHIATRY & NEUROLOGY

## 2019-09-19 PROCEDURE — 36415 COLL VENOUS BLD VENIPUNCTURE: CPT | Performed by: PSYCHIATRY & NEUROLOGY

## 2019-09-19 PROCEDURE — 82306 VITAMIN D 25 HYDROXY: CPT | Performed by: PSYCHIATRY & NEUROLOGY

## 2019-09-19 PROCEDURE — 84443 ASSAY THYROID STIM HORMONE: CPT | Performed by: PSYCHIATRY & NEUROLOGY

## 2019-09-19 PROCEDURE — G0463 HOSPITAL OUTPT CLINIC VISIT: HCPCS | Mod: ZF

## 2019-09-19 PROCEDURE — 80076 HEPATIC FUNCTION PANEL: CPT | Performed by: PSYCHIATRY & NEUROLOGY

## 2019-09-19 RX ORDER — OXYBUTYNIN CHLORIDE 5 MG/1
TABLET ORAL 2 TIMES DAILY
COMMUNITY
Start: 2019-09-18 | End: 2020-11-13

## 2019-09-19 ASSESSMENT — PAIN SCALES - GENERAL: PAINLEVEL: NO PAIN (0)

## 2019-09-19 ASSESSMENT — MIFFLIN-ST. JEOR: SCORE: 1390.73

## 2019-09-19 NOTE — LETTER
"9/19/2019      RE: Cecile Mays  [address]  [facility] [address]     Referral source: Established patient     Chief complaint: Multiple sclerosis     History of the Present Illness: Ms. Cecile Masy is a 63-year-old woman who returns to the Multiple Sclerosis Clinic today for a scheduled follow-up visit after earlier MRI scan of the brain.      The patient's history is as per my previous notes.  She initially developed symptoms of demyelinating disease in 2016 when she had an episode of left facial numbness.  MRI and CSF studies were suggestive of demyelinating disease of the type seen in multiple sclerosis.  Subsequently she was formally diagnosed with multiple sclerosis based on radiologic accumulation of lesions on MRI when she was on disease-modifying treatment with glatiramer acetate.  She was then switched to the Rebif formulation of interferon beta in 2017, and remains on that medication.      Today, the patient relates that she is stableoverall .  She denies any new episodic changes in vision, balance, strength or sensation suggestive of new relapse of multiple sclerosis since she was last seen.      Symptomatically, she does have a sense of weakness in the left leg, although this is not functionally limiting.  She has fatigue that is exacerbated by extremes of temperature.  She continues to have a long-standing visual complaint that her \"eyes don't work well together.\"  She was evaluated by Dr. Fortune in Neuro-Ophthalmology earlier this year, and there was no evidence of any lesion in the central visual pathways related to multiple sclerosis.  Her ocular problems are primarily felt to be due to dry eyes, perhaps related to prior LASIK procedure.      She does have urinary frequency and nocturia.  She is managing this by avoiding caffeine later in the day as well as taking 2.5 mg of oxybutynin at bedtime.  She could not tolerate higher doses because of dry mouth.        I " reviewed MRI scan of the brain performed on 08/27/2019 with the patient.  Again seen are multiple periventricular and juxtacortical foci of T2 hyperintensity in the white matter of the cerebral hemispheres bilaterally, several of which are hypointense on T1 sequences.  There is no abnormal enhancement with gadolinium contrast and no change in comparison to 09/24/2018.        PHYSICAL EXAMINATION:   VITAL SIGNS:  Blood pressure 150/83; pulse 65; weight 80.6 kg; height 1.69 m.   GENERAL:  Well-nourished woman who presents to the examination accompanied by her , awake and alert and in no acute distress.      Assessment/plan:    1.  Relapsing-remitting multiple sclerosis  The patient remains clinically and radiologically stable on her current disease-modifying therapy with Rebif and we will continue that medication.  I will check routine laboratory studies today to include blood counts and liver function tests.  We will check a TSH level.  We will also check a vitamin D level and advise her on supplementation as needed to maintain her level within the goal range of 60-80 mcg/L.      We discussed lifestyle measures that may be helpful for her symptoms.  She tries to follow a heart-healthy diet and exercise regularly.  I reassured her that I think that she is doing all the right things in this regard.     2.  Gait disturbance  She does note that in comparison to 5 years ago, there are some changes in her gait; for example, in that she can no longer take stairs two at a time.  She appears to be compensating relatively well, however, and is not falling.  We will continue to keep an eye on this over time.     3.  Urinary urgency  Her symptoms are under reasonable control on a low dose of oxybutynin at 2.5 mg at bedtime, and we will just continue this for now.      I will see her back in a year with MRI scans of the brain and cervical spine to be performed prior to that appointment or sooner if she has new symptoms  that are of concern to her.      I spent 28 minutes with the patient in the office today, with greater than 50% of this time spent in counseling and in reviewing the MRI images with her.     Viraj Santoyo MD   of Neurology  [facility]    Cc:  Tommy Pringle MD (PCP)  Patient

## 2019-09-19 NOTE — PROGRESS NOTES
"Date of service: September 19, 2019     Referral source: Established patient     Chief complaint: Multiple sclerosis     History of the Present Illness: Ms. Cecile Mays is a 63-year-old woman who returns to the Multiple Sclerosis Clinic today for a scheduled follow-up visit after earlier MRI scan of the brain.      The patient's history is as per my previous notes.  She initially developed symptoms of demyelinating disease in 2016 when she had an episode of left facial numbness.  MRI and CSF studies were suggestive of demyelinating disease of the type seen in multiple sclerosis.  Subsequently she was formally diagnosed with multiple sclerosis based on radiologic accumulation of lesions on MRI when she was on disease-modifying treatment with glatiramer acetate.  She was then switched to the Rebif formulation of interferon beta in 2017, and remains on that medication.      Today, the patient relates that she is stableoverall .  She denies any new episodic changes in vision, balance, strength or sensation suggestive of new relapse of multiple sclerosis since she was last seen.      Symptomatically, she does have a sense of weakness in the left leg, although this is not functionally limiting.  She has fatigue that is exacerbated by extremes of temperature.  She continues to have a long-standing visual complaint that her \"eyes don't work well together.\"  She was evaluated by Dr. Fortune in Neuro-Ophthalmology earlier this year, and there was no evidence of any lesion in the central visual pathways related to multiple sclerosis.  Her ocular problems are primarily felt to be due to dry eyes, perhaps related to prior LASIK procedure.      She does have urinary frequency and nocturia.  She is managing this by avoiding caffeine later in the day as well as taking 2.5 mg of oxybutynin at bedtime.  She could not tolerate higher doses because of dry mouth.        I reviewed MRI scan of the brain performed on " 08/27/2019 with the patient.  Again seen are multiple periventricular and juxtacortical foci of T2 hyperintensity in the white matter of the cerebral hemispheres bilaterally, several of which are hypointense on T1 sequences.  There is no abnormal enhancement with gadolinium contrast and no change in comparison to 09/24/2018.      PHYSICAL EXAMINATION:   VITAL SIGNS:  Blood pressure 150/83; pulse 65; weight 80.6 kg; height 1.69 m.   GENERAL:  Well-nourished woman who presents to the examination accompanied by her , awake and alert and in no acute distress.      Assessment/plan:    1.  Relapsing-remitting multiple sclerosis  The patient remains clinically and radiologically stable on her current disease-modifying therapy with Rebif and we will continue that medication.  I will check routine laboratory studies today to include blood counts and liver function tests.  We will check a TSH level.  We will also check a vitamin D level and advise her on supplementation as needed to maintain her level within the goal range of 60-80 mcg/L.      We discussed lifestyle measures that may be helpful for her symptoms.  She tries to follow a heart-healthy diet and exercise regularly.  I reassured her that I think that she is doing all the right things in this regard.     2.  Gait disturbance  She does note that in comparison to 5 years ago, there are some changes in her gait; for example, in that she can no longer take stairs two at a time.  She appears to be compensating relatively well, however, and is not falling.  We will continue to keep an eye on this over time.     3.  Urinary urgency  Her symptoms are under reasonable control on a low dose of oxybutynin at 2.5 mg at bedtime, and we will just continue this for now.      I will see her back in a year with MRI scans of the brain and cervical spine to be performed prior to that appointment or sooner if she has new symptoms that are of concern to her.      I spent 28  minutes with the patient in the office today, with greater than 50% of this time spent in counseling and in reviewing the MRI images with her.         CHARITY NICOLE MD             D: 2019   T: 2019   MT: karli      Name:     LETY CHOWDHURY   MRN:      [id number]        Account:      [id number]   :      1955           Service Date: 2019      Document: [id number]

## 2019-09-19 NOTE — PATIENT INSTRUCTIONS
1. Blood tests today    2. Return to clinic in one year after MRI scans of the brain and cervical spine    3. Continue Rebif

## 2019-09-25 DIAGNOSIS — G35 MULTIPLE SCLEROSIS (H): ICD-10-CM

## 2019-09-26 ENCOUNTER — TELEPHONE (OUTPATIENT)
Dept: PHARMACY | Facility: CLINIC | Age: 64
End: 2019-09-26

## 2019-09-26 RX ORDER — INTERFERON BETA-1A 44 UG/.5ML
INJECTION, SOLUTION SUBCUTANEOUS
Qty: 12 SYRINGE | Refills: 11 | Status: SHIPPED | OUTPATIENT
Start: 2019-09-26 | End: 2020-01-30

## 2019-09-26 NOTE — TELEPHONE ENCOUNTER
Received refill request for REBIF from [facility]; Patient was last seen on 9/19/2019 and has follow up appointment on not yet schedule- Message sent to clinic coordinator to call the patient to schedule MRI along with follow up with Dr Santoyo in 1 yr as per last visit notes. Pended to [facility] pool for review/approval    Niru Kent MA

## 2019-09-28 ENCOUNTER — HEALTH MAINTENANCE LETTER (OUTPATIENT)
Age: 64
End: 2019-09-28

## 2019-10-08 ENCOUNTER — IMMUNIZATION (OUTPATIENT)
Dept: URGENT CARE | Facility: RETAIL CLINIC | Age: 64
End: 2019-10-08
Payer: COMMERCIAL

## 2019-10-08 PROCEDURE — 90662 IIV NO PRSV INCREASED AG IM: CPT | Performed by: NURSE PRACTITIONER

## 2019-10-08 PROCEDURE — 90471 IMMUNIZATION ADMIN: CPT | Performed by: NURSE PRACTITIONER

## 2019-10-19 ENCOUNTER — MYC MEDICAL ADVICE (OUTPATIENT)
Dept: NEUROLOGY | Facility: CLINIC | Age: 64
End: 2019-10-19

## 2019-12-18 ENCOUNTER — TELEPHONE (OUTPATIENT)
Dept: FAMILY MEDICINE | Facility: CLINIC | Age: 64
End: 2019-12-18

## 2019-12-18 NOTE — TELEPHONE ENCOUNTER
Patient is due for a PHQ-9.  Index start date:9/01/2019  Index end date:12/30/2019    Please call patient. Pt is active on [employer]. I have sent a PHQ-9 via [employer] and will postpone the encounter. Ruth Ann Edmondson CMA ([facility])

## 2019-12-20 NOTE — TELEPHONE ENCOUNTER
Pt completed PHQ-9.    PHQ-9 SCORE 12/19/2019   PHQ-9 Total Score -   PHQ-9 Total Score MyChart 0   PHQ-9 Total Score 0     Ruth Ann Edmondson CMA ([facility])

## 2020-01-17 ENCOUNTER — TELEPHONE (OUTPATIENT)
Dept: NEUROLOGY | Facility: CLINIC | Age: 65
End: 2020-01-17

## 2020-01-17 NOTE — TELEPHONE ENCOUNTER
[facility] Call Center    Phone Message    May a detailed message be left on voicemail: yes    Reason for Call: Other: Chad from [facility] [employer] called to see if we recieved the medication request they faxed over for Rebif. Please give them a call back at [phone].     Action Taken: Message routed to:  [facility] ([facility] Neurology

## 2020-01-21 NOTE — TELEPHONE ENCOUNTER
Form received and in Dr. Santoyo's folder for review and signature when he is in clinic on Thursday.    Mer Garcia, MS RN Care Coordinator

## 2020-01-22 ENCOUNTER — TELEPHONE (OUTPATIENT)
Dept: NEUROLOGY | Facility: CLINIC | Age: 65
End: 2020-01-22

## 2020-01-23 ENCOUNTER — MEDICAL CORRESPONDENCE (OUTPATIENT)
Dept: HEALTH INFORMATION MANAGEMENT | Facility: CLINIC | Age: 65
End: 2020-01-23

## 2020-01-29 NOTE — TELEPHONE ENCOUNTER
Received a letter stating patient has insurance through [employer] where Rebif requires a prior Authorization. The payer requires that a verbal prior authorization be submitted. Please call [employer] directly at [phone] to answer coverage criteria questions and then notify [employer] ([phone])  Letter placed in provider folder  Niru Kent MA

## 2020-01-30 ENCOUNTER — TELEPHONE (OUTPATIENT)
Dept: NEUROLOGY | Facility: CLINIC | Age: 65
End: 2020-01-30

## 2020-01-30 DIAGNOSIS — G35 MULTIPLE SCLEROSIS (H): ICD-10-CM

## 2020-01-30 NOTE — TELEPHONE ENCOUNTER
Prior Authorization Approval    Authorization Effective Date: 12/31/2019  Authorization Expiration Date: 1/29/2023  Medication: REBIF REBIDOSE 44 MCG/0.5ML SOAJ (APPROVED)  Approved Dose/Quantity: 12 syringes (0.6ML)/28 days  Reference #:     Insurance Company: [employer] - Phone [phone] Fax [phone]  Expected CoPay:       CoPay Card Available:      Foundation Assistance Needed:    Which Pharmacy is filling the prescription (Not needed for infusion/clinic administered): [facility] - [place], [place] - [address]  Pharmacy Notified: Yes  Patient Notified: Yes

## 2020-01-30 NOTE — TELEPHONE ENCOUNTER
Prior Authorization Specialty Medication Request    Medication/Dose: REBIF REBIDOSE 44 MCG/0.5ML SOAJ  ICD code (if different than what is on RX):  Multiple sclerosis (H) (G35)  Previously Tried and Failed:      Important Lab Values:   Rationale:     Insurance Name: [employer]   Insurance ID: [id number]  Insurance Phone Number:     Pharmacy Information (if different than what is on RX)  Name:  [address]  Phone: [phone]

## 2020-01-30 NOTE — TELEPHONE ENCOUNTER
Rx sent to [facility] per [facility] refill protocol.    Mer Garcia, MS RN Care Coordinator

## 2020-02-05 ENCOUNTER — TELEPHONE (OUTPATIENT)
Dept: NEUROLOGY | Facility: CLINIC | Age: 65
End: 2020-02-05

## 2020-02-05 NOTE — TELEPHONE ENCOUNTER
PA Initiation    Medication: Rebif   Insurance Company: [employer] - Phone [phone] Fax [phone]  Pharmacy Filling the Rx: [facility] - [place] [address]  Filling Pharmacy Phone: [phone]  Filling Pharmacy Fax: [phone]  Start Date: 2/5/2020   (had to send urgent PA, the approved PA was under a NDC that isnt available anymore and [facility] wouldn't switch the NDC)             Prior Authorization Approval    Authorization Effective Date:  01/06/2020  Authorization Expiration Date:  02/04/2023  Medication: Rebif   Approved Dose/Quantity: 06/28  Reference #: [id number]   Insurance Company: [email or web address] - Phone [phone] Fax [phone]  Expected CoPay:       CoPay Card Available:      Foundation Assistance Needed:    Which Pharmacy is filling the prescription (Not needed for infusion/clinic administered): [facility] - [place] [address]  Pharmacy Notified: Yes  Patient Notified: Yes

## 2020-02-09 DIAGNOSIS — Z87.440 H/O RECURRENT URINARY TRACT INFECTION: ICD-10-CM

## 2020-02-11 RX ORDER — SULFAMETHOXAZOLE/TRIMETHOPRIM 800-160 MG
TABLET ORAL
Qty: 14 TABLET | Refills: 6 | Status: SHIPPED | OUTPATIENT
Start: 2020-02-11 | End: 2021-03-11

## 2020-02-11 NOTE — TELEPHONE ENCOUNTER
Routing refill request to provider for review/approval because:  Drug not on the [facility] refill protocol for diagnosis    Joy Holder RN on 2/11/2020 at 11:50 AM

## 2020-02-11 NOTE — TELEPHONE ENCOUNTER
"Requested Prescriptions   Pending Prescriptions Disp Refills     sulfamethoxazole-trimethoprim (BACTRIM DS/SEPTRA DS) 800-160 MG tablet [Pharmacy Med Name: SULFAMETHOXAZOLE-TRIME 800-160 TABS] 14 tablet 6     Sig: TAKE ONE TABLET BY MOUTH TWICE A DAY   Last Written Prescription Date:  10/31/18  Last Fill Quantity: 14,  # refills: 6   Last office visit: 6/21/19 Pringle  Future Office Visit:        Oral Acne/Rosacea Medications Protocol Failed - 2/9/2020  6:15 PM        Failed - Confirmation of diagnosis is required     Please confirm diagnosis is acne or rosacea.     If NOT acne or rosacea; refer request to provider for further evaluation.    If diagnosis IS acne or rosacea, OK to refill BASED ON PREVIOUS REFILL CLINICAL NOTE RECOMMENDATION.          Passed - Patient is 12 years of age or older        Passed - Recent (12 mo) or future (30 days) visit within the authorizing provider's specialty     Patient has had an office visit with the authorizing provider or a provider within the authorizing providers department within the previous 12 mos or has a future within next 30 days. See \"Patient Info\" tab in inbasket, or \"Choose Columns\" in Meds & Orders section of the refill encounter.              Passed - Medication is active on med list        Passed - No active pregnancy on record        Passed - No positive prenancy test is past 12 months          "

## 2020-03-17 ENCOUNTER — HOSPITAL ENCOUNTER (OUTPATIENT)
Dept: MAMMOGRAPHY | Facility: CLINIC | Age: 65
Discharge: HOME | End: 2020-03-17
Attending: FAMILY MEDICINE | Admitting: FAMILY MEDICINE
Payer: COMMERCIAL

## 2020-03-17 DIAGNOSIS — Z12.31 VISIT FOR SCREENING MAMMOGRAM: ICD-10-CM

## 2020-03-17 PROCEDURE — 77067 SCR MAMMO BI INCL CAD: CPT

## 2020-04-13 ENCOUNTER — MYC MEDICAL ADVICE (OUTPATIENT)
Dept: FAMILY MEDICINE | Facility: CLINIC | Age: 65
End: 2020-04-13

## 2020-04-13 DIAGNOSIS — F33.0 MAJOR DEPRESSIVE DISORDER, RECURRENT EPISODE, MILD (H): Primary | ICD-10-CM

## 2020-04-13 RX ORDER — BUPROPION HYDROCHLORIDE 150 MG/1
150 TABLET ORAL EVERY MORNING
Qty: 30 TABLET | Refills: 0 | Status: SHIPPED | OUTPATIENT
Start: 2020-04-13 | End: 2020-11-13

## 2020-06-29 DIAGNOSIS — F33.0 MAJOR DEPRESSIVE DISORDER, RECURRENT EPISODE, MILD (H): ICD-10-CM

## 2020-06-29 NOTE — LETTER
June 30, 2020      Cecile Walkerbarber  [address] [facility] [address]        Dear ,    We are writing to inform you of your test results.    {results letter list:041590}    No results found from the In Basket message.    If you have any questions or concerns, please call the clinic at the number listed above.       Sincerely,        Tommy Braulio Pringle MD

## 2020-06-30 RX ORDER — SERTRALINE HYDROCHLORIDE 100 MG/1
TABLET, FILM COATED ORAL
Qty: 90 TABLET | Refills: 1 | OUTPATIENT
Start: 2020-06-30

## 2020-06-30 NOTE — TELEPHONE ENCOUNTER
Routing to schedulers to set up virtual appointment for patient for depression check/med renewal.  Please also ask patient how much medication she has left and schedule appropriately.   If patient needs a refill before their appointment, please route to RN for completion of refill.  Upon routing message, write WHEN appointment is scheduled and if they have enough medication to last to appointment.  Thank you!

## 2020-07-01 ENCOUNTER — VIRTUAL VISIT (OUTPATIENT)
Dept: FAMILY MEDICINE | Facility: CLINIC | Age: 65
End: 2020-07-01
Payer: MEDICARE

## 2020-07-01 DIAGNOSIS — F33.0 MAJOR DEPRESSIVE DISORDER, RECURRENT EPISODE, MILD (H): ICD-10-CM

## 2020-07-01 PROCEDURE — 99213 OFFICE O/P EST LOW 20 MIN: CPT | Mod: 95 | Performed by: FAMILY MEDICINE

## 2020-07-01 RX ORDER — SERTRALINE HYDROCHLORIDE 100 MG/1
100 TABLET, FILM COATED ORAL DAILY
Qty: 90 TABLET | Refills: 1 | Status: SHIPPED | OUTPATIENT
Start: 2020-07-01 | End: 2020-12-28

## 2020-07-01 ASSESSMENT — PATIENT HEALTH QUESTIONNAIRE - PHQ9
5. POOR APPETITE OR OVEREATING: NOT AT ALL
SUM OF ALL RESPONSES TO PHQ QUESTIONS 1-9: 1

## 2020-07-01 ASSESSMENT — ANXIETY QUESTIONNAIRES
2. NOT BEING ABLE TO STOP OR CONTROL WORRYING: NOT AT ALL
7. FEELING AFRAID AS IF SOMETHING AWFUL MIGHT HAPPEN: NOT AT ALL
3. WORRYING TOO MUCH ABOUT DIFFERENT THINGS: NOT AT ALL
GAD7 TOTAL SCORE: 0
6. BECOMING EASILY ANNOYED OR IRRITABLE: NOT AT ALL
1. FEELING NERVOUS, ANXIOUS, OR ON EDGE: NOT AT ALL
IF YOU CHECKED OFF ANY PROBLEMS ON THIS QUESTIONNAIRE, HOW DIFFICULT HAVE THESE PROBLEMS MADE IT FOR YOU TO DO YOUR WORK, TAKE CARE OF THINGS AT HOME, OR GET ALONG WITH OTHER PEOPLE: NOT DIFFICULT AT ALL
5. BEING SO RESTLESS THAT IT IS HARD TO SIT STILL: NOT AT ALL

## 2020-07-01 NOTE — PATIENT INSTRUCTIONS
Continue taking sertraline.  If with brothers cancer issues develop, let me know  Keep managing MS exactly as you are.  Check back periodically to see if you can schedule late this summer early this fall.

## 2020-07-01 NOTE — PROGRESS NOTES
"Cecile Mays is a 64 year old female who is being evaluated via a billable telephone visit.      The patient has been notified of following:     \"This telephone visit will be conducted via a call between you and your physician/provider. We have found that certain health care needs can be provided without the need for a physical exam.  This service lets us provide the care you need with a short phone conversation.  If a prescription is necessary we can send it directly to your pharmacy.  If lab work is needed we can place an order for that and you can then stop by our lab to have the test done at a later time.    Telephone visits are billed at different rates depending on your insurance coverage. During this emergency period, for some insurers they may be billed the same as an in-person visit.  Please reach out to your insurance provider with any questions.    If during the course of the call the physician/provider feels a telephone visit is not appropriate, you will not be charged for this service.\"    Patient has given verbal consent for Telephone visit?  Yes    What phone number would you like to be contacted at? [phone]    How would you like to obtain your AVS? Blanco Elliott     Cecile Mays is a 64 year old female who presents via phone visit today for the following health issues:    HPI  Depression and Anxiety Follow-Up    How are you doing with your depression since your last visit? No change    How are you doing with your anxiety since your last visit?  No change    Are you having other symptoms that might be associated with depression or anxiety? No    Have you had a significant life event? OTHER: brother diagnosed with lung cancer     Do you have any concerns with your use of alcohol or other drugs? No    Social History     Tobacco Use     Smoking status: Never Smoker     Smokeless tobacco: Never Used   Substance Use Topics     Alcohol use: No     Alcohol/week: 0.0 " standard drinks     Drug use: No     PHQ 3/19/2019 6/21/2019 12/19/2019   PHQ-9 Total Score 5 2 0   Q9: Thoughts of better off dead/self-harm past 2 weeks Not at all Not at all Not at all     LEATHA-7 SCORE 1/27/2017 1/8/2018 10/31/2018   Total Score 13 13 11     Last PHQ-9 7/1/2020   1.  Little interest or pleasure in doing things 0   2.  Feeling down, depressed, or hopeless 1   3.  Trouble falling or staying asleep, or sleeping too much 0   4.  Feeling tired or having little energy 0   5.  Poor appetite or overeating 0   6.  Feeling bad about yourself 0   7.  Trouble concentrating 0   8.  Moving slowly or restless 0   Q9: Thoughts of better off dead/self-harm past 2 weeks 0   PHQ-9 Total Score 1   Difficulty at work, home, or with people Not difficult at all     LEATHA-7  7/1/2020   1. Feeling nervous, anxious, or on edge 0   2. Not being able to stop or control worrying 0   3. Worrying too much about different things 0   4. Trouble relaxing 0   5. Being so restless that it is hard to sit still 0   6. Becoming easily annoyed or irritable 0   7. Feeling afraid, as if something awful might happen 0   LEATHA-7 Total Score 0   If you checked any problems, how difficult have they made it for you to do your work, take care of things at home, or get along with other people? Not difficult at all     Suicide thoughts of not equation    Suicide Assessment Five-step Evaluation and Treatment (SAFE-T)      Doing fairly well.  Handling her brother's cancer diagnosis.  It is unlikely he will do well and recover.  Because of this she will be interacting with family and due to childhood upbringing and family dynamics this will be mildly stressful.  She believes she can handle it.  She and her  are socially isolating protect themselves from COVID-19.  If she taxes herself too much, MS symptoms are more severe.  Sight limitations are her biggest problem with MS.    Patient Active Problem List   Diagnosis     Urge incontinence      Allergic rhinitis     Hyperlipidemia LDL goal <130     Advanced directives, counseling/discussion     Gastroesophageal reflux disease with esophagitis     Adjustment disorder with anxious mood     Sacroiliac joint dysfunction     Multiple sclerosis (H)     Segmental dysfunction of sacral region     Segmental dysfunction of lumbar region     Segmental dysfunction of thoracic region     Lumbago     Major depressive disorder, recurrent episode, mild (H)     Past Surgical History:   Procedure Laterality Date     C LIGATE FALLOPIAN TUBE       COLONOSCOPY  2014    Procedure: COLONOSCOPY;  Colonoscopy;  Surgeon: Jose Martin Dye MD;  Location:  GI       Social History     Tobacco Use     Smoking status: Never Smoker     Smokeless tobacco: Never Used   Substance Use Topics     Alcohol use: No     Alcohol/week: 0.0 standard drinks     Family History   Problem Relation Age of Onset     Cancer Father          at age 70     Lipids Father      Hypertension Father      Diabetes Father         Adult Onset     Heart Disease Mother         low blood pressure, passed away 2004 lung cancer     Cancer Mother         Lung     Diabetes Mother         Adult Onset     Respiratory Sister         uses inhalers     Cancer Sister         Lung     Heart Disease Sister         Hx Rhumatic fever         Current Outpatient Medications   Medication Sig Dispense Refill     albuterol (PROAIR HFA/PROVENTIL HFA/VENTOLIN HFA) 108 (90 Base) MCG/ACT inhaler Inhale 2 puffs into the lungs every 6 hours as needed for shortness of breath / dyspnea or wheezing 1 Inhaler 1     aspirin 81 MG tablet Take 1 tablet (81 mg) by mouth daily 90 tablet 3     budesonide-formoterol (SYMBICORT) 160-4.5 MCG/ACT Inhaler Inhale 2 puffs into the lungs 2 times daily 3 Inhaler 1     Calcium Carb-Cholecalciferol (CALCIUM 600/VITAMIN D3) 600-800 MG-UNIT TABS daily       Cholecalciferol (VITAMIN D) 2000 UNITS tablet 3,000 units daily 100 tablet 3     Cranberry 500  MG CAPS        Cyanocobalamin (B-12) 1000 MCG TBCR Take 1,000 mcg by mouth daily 100 tablet 1     Glucos-MSM-C-Qo-Fwdkam-Eihxrw (GLUCOSAMINE MSM COMPLEX) TABS tablet Take 1 tablet by mouth daily 100 tablet 3     Interferon Beta-1a (REBIF REBIDOSE) 44 MCG/0.5ML SOAJ Inject 44 mcg Subcutaneous three times a week 12 Syringe 11     Krill Oil Omega-3 500 MG CAPS 2 daily       montelukast (SINGULAIR) 10 MG tablet Take 1 tablet (10 mg) by mouth At Bedtime 30 tablet 5     oxybutynin (DITROPAN) 5 MG tablet 2 times daily       sertraline (ZOLOFT) 100 MG tablet Take 1 tablet (100 mg) by mouth daily 90 tablet 1     WOMENS DAILY MULTIVITAMIN OR TABS 1 TABLET DAILY 30 0     buPROPion (WELLBUTRIN XL) 150 MG 24 hr tablet Take 1 tablet (150 mg) by mouth every morning (Patient not taking: Reported on 7/1/2020) 30 tablet 0     coenzyme Q-10 200 MG CAPS        lifitegrast (XIIDRA) 5 % opthalmic solution Place 1 drop into both eyes 2 times daily (Patient not taking: Reported on 7/1/2020) 60 each 3     sulfamethoxazole-trimethoprim (BACTRIM DS/SEPTRA DS) 800-160 MG tablet TAKE ONE TABLET BY MOUTH TWICE A DAY (Patient not taking: Reported on 7/1/2020) 14 tablet 6     Allergies   Allergen Reactions     No Known Drug Allergy      BP Readings from Last 3 Encounters:   09/19/19 (!) 150/83   06/21/19 118/82   03/28/19 128/79    Wt Readings from Last 3 Encounters:   09/19/19 80.6 kg (177 lb 11.2 oz)   06/21/19 80.3 kg (177 lb)   03/28/19 81.3 kg (179 lb 3.2 oz)                    Reviewed and updated as needed this visit by Provider         Review of Systems   Constitutional, HEENT, cardiovascular, pulmonary, gi and gu systems are negative, except as otherwise noted.       Objective   Reported vitals:  LMP 02/05/2008    healthy, alert and no distress  PSYCH: Alert and oriented times 3; coherent speech, normal   rate and volume, able to articulate logical thoughts, able   to abstract reason, no tangential thoughts, no hallucinations   or  delusions  Her affect is normal  RESP: No cough, no audible wheezing, able to talk in full sentences  Remainder of exam unable to be completed due to telephone visits    Diagnostic Test Results:  Labs reviewed in Epic        Assessment/Plan:  1. Major depressive disorder, recurrent episode, mild (H)  Continue sertraline.  She is tolerating life in general.  - sertraline (ZOLOFT) 100 MG tablet; Take 1 tablet (100 mg) by mouth daily  Dispense: 90 tablet; Refill: 1    Multiple sclerosis is in good control and being followed by neurology.  No other immediate health concerns.  Laboratory would be due in September or after.  Return in about 3 months (around 10/1/2020) for Physical Exam MS, mental health.      Phone call duration:  11 minutes 9:57 AM to 10:08 AM    Tommy Pringle MD

## 2020-07-02 ASSESSMENT — ANXIETY QUESTIONNAIRES: GAD7 TOTAL SCORE: 0

## 2020-08-16 DIAGNOSIS — G35 MULTIPLE SCLEROSIS (H): ICD-10-CM

## 2020-08-16 DIAGNOSIS — H04.123 DRY EYE SYNDROME OF BOTH EYES: ICD-10-CM

## 2020-08-17 NOTE — TELEPHONE ENCOUNTER
XIIDRA 5% SOLN     Requested directions: Place 1 drop into both eyes 2 times daily - Both Eyes   Current directions on the medication list: Place 1 drop into both eyes 2 times daily - Both Eyes     Last Written Prescription Date:  4/24/2019  Last Fill Quantity: 60,   # refills: 3    Last Office Visit: 4/24/2019  Future Office visit: 11/25/2020    Attending Provider:     Reynaldo Ho MD   Ophthalmology      Last Clinic Note: 4/24/2019    1. Dry eye syndrome  -h/o LASIK  -cont aggressive PFATs  -start WC  -no improvement with restasis in past  -trial of xiidra BID OU  -LLL collagen punctal plug 0.2mm placed today, consider 0.3mm vs. Silicone next time (per history previous provider was unable to place silicone plug LLL)     Routing refill request to provider for review/approval because:  Drug not on the G, P or [facility] refill protocol or controlled substance      Whitney Dougherty RN  Central Triage Red Flags/Med Refills

## 2020-08-18 RX ORDER — LIFITEGRAST 50 MG/ML
1 SOLUTION/ DROPS OPHTHALMIC 2 TIMES DAILY
Qty: 60 EACH | Refills: 3 | Status: SHIPPED | OUTPATIENT
Start: 2020-08-18 | End: 2020-11-13

## 2020-09-10 ENCOUNTER — HOSPITAL ENCOUNTER (OUTPATIENT)
Dept: MRI IMAGING | Facility: CLINIC | Age: 65
End: 2020-09-10
Attending: PSYCHIATRY & NEUROLOGY
Payer: MEDICARE

## 2020-09-10 DIAGNOSIS — G35 MS (MULTIPLE SCLEROSIS) (H): ICD-10-CM

## 2020-09-10 PROCEDURE — 70553 MRI BRAIN STEM W/O & W/DYE: CPT

## 2020-09-10 PROCEDURE — 25500064 ZZH RX 255 OP 636: Performed by: PSYCHIATRY & NEUROLOGY

## 2020-09-10 PROCEDURE — A9585 GADOBUTROL INJECTION: HCPCS | Performed by: PSYCHIATRY & NEUROLOGY

## 2020-09-10 PROCEDURE — 72156 MRI NECK SPINE W/O & W/DYE: CPT

## 2020-09-10 RX ORDER — GADOBUTROL 604.72 MG/ML
7.5 INJECTION INTRAVENOUS ONCE
Status: COMPLETED | OUTPATIENT
Start: 2020-09-10 | End: 2020-09-10

## 2020-09-10 RX ADMIN — GADOBUTROL 7.5 ML: 604.72 INJECTION INTRAVENOUS at 08:41

## 2020-09-17 ENCOUNTER — OFFICE VISIT (OUTPATIENT)
Dept: NEUROLOGY | Facility: CLINIC | Age: 65
End: 2020-09-17
Attending: PSYCHIATRY & NEUROLOGY
Payer: MEDICARE

## 2020-09-17 VITALS
HEIGHT: 67 IN | SYSTOLIC BLOOD PRESSURE: 167 MMHG | WEIGHT: 179.8 LBS | BODY MASS INDEX: 28.22 KG/M2 | DIASTOLIC BLOOD PRESSURE: 79 MMHG | HEART RATE: 81 BPM

## 2020-09-17 DIAGNOSIS — R53.82 CHRONIC FATIGUE: ICD-10-CM

## 2020-09-17 DIAGNOSIS — R26.9 GAIT DISTURBANCE: ICD-10-CM

## 2020-09-17 DIAGNOSIS — G35 MS (MULTIPLE SCLEROSIS) (H): ICD-10-CM

## 2020-09-17 DIAGNOSIS — E55.9 VITAMIN D DEFICIENCY: ICD-10-CM

## 2020-09-17 DIAGNOSIS — G35 MS (MULTIPLE SCLEROSIS) (H): Primary | ICD-10-CM

## 2020-09-17 DIAGNOSIS — R39.15 URINARY URGENCY: ICD-10-CM

## 2020-09-17 LAB
ALBUMIN SERPL-MCNC: 3.6 G/DL (ref 3.4–5)
ALP SERPL-CCNC: 143 U/L (ref 40–150)
ALT SERPL W P-5'-P-CCNC: 38 U/L (ref 0–50)
AST SERPL W P-5'-P-CCNC: 19 U/L (ref 0–45)
BASOPHILS # BLD AUTO: 0.1 10E9/L (ref 0–0.2)
BASOPHILS NFR BLD AUTO: 1 %
BILIRUB DIRECT SERPL-MCNC: 0.1 MG/DL (ref 0–0.2)
BILIRUB SERPL-MCNC: 0.4 MG/DL (ref 0.2–1.3)
DIFFERENTIAL METHOD BLD: NORMAL
EOSINOPHIL # BLD AUTO: 0.1 10E9/L (ref 0–0.7)
EOSINOPHIL NFR BLD AUTO: 1.9 %
ERYTHROCYTE [DISTWIDTH] IN BLOOD BY AUTOMATED COUNT: 12.6 % (ref 10–15)
HCT VFR BLD AUTO: 42 % (ref 35–47)
HGB BLD-MCNC: 13.4 G/DL (ref 11.7–15.7)
IMM GRANULOCYTES # BLD: 0 10E9/L (ref 0–0.4)
IMM GRANULOCYTES NFR BLD: 0.2 %
LYMPHOCYTES # BLD AUTO: 1.5 10E9/L (ref 0.8–5.3)
LYMPHOCYTES NFR BLD AUTO: 31.5 %
MCH RBC QN AUTO: 29.6 PG (ref 26.5–33)
MCHC RBC AUTO-ENTMCNC: 31.9 G/DL (ref 31.5–36.5)
MCV RBC AUTO: 93 FL (ref 78–100)
MONOCYTES # BLD AUTO: 0.5 10E9/L (ref 0–1.3)
MONOCYTES NFR BLD AUTO: 10.4 %
NEUTROPHILS # BLD AUTO: 2.7 10E9/L (ref 1.6–8.3)
NEUTROPHILS NFR BLD AUTO: 55 %
NRBC # BLD AUTO: 0 10*3/UL
NRBC BLD AUTO-RTO: 0 /100
PLATELET # BLD AUTO: 238 10E9/L (ref 150–450)
PROT SERPL-MCNC: 7.3 G/DL (ref 6.8–8.8)
RBC # BLD AUTO: 4.52 10E12/L (ref 3.8–5.2)
TSH SERPL DL<=0.005 MIU/L-ACNC: 1.68 MU/L (ref 0.4–4)
WBC # BLD AUTO: 4.8 10E9/L (ref 4–11)

## 2020-09-17 PROCEDURE — 80076 HEPATIC FUNCTION PANEL: CPT | Performed by: PSYCHIATRY & NEUROLOGY

## 2020-09-17 PROCEDURE — G0463 HOSPITAL OUTPT CLINIC VISIT: HCPCS

## 2020-09-17 PROCEDURE — 85025 COMPLETE CBC W/AUTO DIFF WBC: CPT | Performed by: PSYCHIATRY & NEUROLOGY

## 2020-09-17 PROCEDURE — 84443 ASSAY THYROID STIM HORMONE: CPT | Performed by: PSYCHIATRY & NEUROLOGY

## 2020-09-17 PROCEDURE — 82306 VITAMIN D 25 HYDROXY: CPT | Performed by: PSYCHIATRY & NEUROLOGY

## 2020-09-17 PROCEDURE — 36415 COLL VENOUS BLD VENIPUNCTURE: CPT | Performed by: PSYCHIATRY & NEUROLOGY

## 2020-09-17 ASSESSMENT — PAIN SCALES - GENERAL: PAINLEVEL: NO PAIN (0)

## 2020-09-17 ASSESSMENT — MIFFLIN-ST. JEOR: SCORE: 1395.26

## 2020-09-17 NOTE — LETTER
"9/17/2020      RE: Cecile Mays  [address]  [facility] [address]     Referral source: Established patient     Chief complaint: Multiple sclerosis     History of the Present Illness: Ms. Cecile Mays is a 64 year-old woman who returns to the Multiple Sclerosis Clinic today for a scheduled follow-up visit after earlier MRI scans of the brain and cervical spine.    The patient's history is as per my previous notes.  She initially developed symptoms of demyelinating disease in 2016 when she had an episode of left facial numbness.  MRI and CSF studies were suggestive of demyelinating disease of the type seen in multiple sclerosis.  Subsequently she was formally diagnosed with multiple sclerosis based on radiologic accumulation of lesions on MRI when she was on disease-modifying treatment with glatiramer acetate.  She was then switched to the Rebif formulation of interferon beta in 2017, and remains on that medication.    Today, she denies any new episodic changes in vision, balance, strength or sensation suggestive of new MS relapse since her last visit to this clinic. She describes her tolerance of Rebif as \"fine, it's OK\" although does relate that the injections are becoming \"monotonous\" for her.    Symptomatically, she relates that she can tell that her left side is getting gradually weaker over time. It is not as easy for her to \"keep up\" with others when walking for longer distances, although she feels that this is less noticeable to others than it is to her. She is not falling.    Regarding bladder symptoms, she has a prescription for oxybutynin 2.5 mg at bedtime but it not using this much as it \"dries everything out\". She endorses some urinary urgency but does not have incontinence for the most part. Overall, she relates that she is satisfied with current symptom control in this regard.    I reviewed images from MRI scans of the brain and cervical spine performed on 9/10/2020 with the " patient. There is no abnormal enhancement with gadolinium contrast throughout the brain and cord parenchyma. In the cervical spine, T2 hyperintense focus in the right side of the cord at C2 is unchanged in comparison to earlier MRI of 9/24/2018. Likewise, multiple periventricular and juxtacortical foci of T2 hyperintensity in the white matter of the cerebral hemispheres bilaterally are unchanged in comparison to previous scan from 8/27/2019.    Past Medical History:  1.  Depression.   2.  Hyperlipidemia.   3.  Allergic rhinitis.   4.  Gastroesophageal reflux disease    PHYSICAL EXAMINATION:   VITAL SIGNS:  Blood pressure 167/79; pulse 81; weight 81.6 kg; height 1.69 meters.   GENERAL:  Well-nourished woman who presents to the examination alone, awake and alert and in no acute distress.  NEUROLOGIC:   MENTAL STATUS:  Alert and oriented times four.   CRANIAL NERVES:  Visual fields are full to confrontation bilaterally.  Extraocular movements are intact with no internuclear ophthalmoplegia.  Facial strength is normal.  Hearing is normal for conversational purposes.  Palate elevation and tongue protrusion are normal.   POWER:  Strength is normal (5/5) in the following muscles/groups bilaterally: Deltoids, biceps, triceps, wrist extensors, finger extensors, first dorsal interosseous, hip flexors hamstrings and anterior tibialis.  REFLEXES:  Reflexes are present and symmetric at biceps, triceps and brachioradialis. Reflexes in the left leg are increased as compared to the right, particularly at the knee.  MOTOR/CEREBELLAR:  There are no tremors, myoclonus or other abnormal movements.  Tone is grossly normal in the limbs.  There is no appendicular ataxia on finger-to-nose testing and rapid alternating movements are normal in the extremities.  There is no pronator drift in the arms.   GAIT:  The patient is able to ambulate on a flat, level surface with no gross loss of postural stability. She could walk on heels and toes,  but not in tandem (did not want to attempt as she did not feel safe).     Assessment/plan:    1. Multiple sclerosis  The patient is clinically and radiologically stable with no evidence of active inflammatory demyelination on current disease modifying therapy with Rebif, and she will continue this medication.     I will perform routine blood tests for monitoring of this medication today to include blood counts, liver function tests and TSH. I will also check a vitamin D level and advise her on supplementation of vitamin D as needed to maintain her level within the goal range of 60-80 mcg/L.    I would like to see her back in one year for a review, or sooner if she has new symptoms that are of concern to her.    2. Gait disturbance  Currently she is compensating reasonably well for her gait difficulties. We will continue to keep a close eye on this symptom over time.    3. Urinary urgency  At present she is not desiring any additional treatment; we can readdress this at any point as needed.    Viraj Santoyo MD   of Neurology  [facility]    Cc: Tommy Pringle MD (PCP); patient

## 2020-09-17 NOTE — LETTER
"9/17/2020       RE: Cecile Mays  [address]  [facility] [address]     Dear Colleague,    Thank you for referring your patient, Cecile Mays, to the [facility] [place] at [facility]. Please see a copy of my visit note below.    Referral source: Established patient     Chief complaint: Multiple sclerosis     History of the Present Illness: Ms. Cecile Mays is a 64 year-old woman who returns to the Multiple Sclerosis Clinic today for a scheduled follow-up visit after earlier MRI scans of the brain and cervical spine.    The patient's history is as per my previous notes.  She initially developed symptoms of demyelinating disease in 2016 when she had an episode of left facial numbness.  MRI and CSF studies were suggestive of demyelinating disease of the type seen in multiple sclerosis.  Subsequently she was formally diagnosed with multiple sclerosis based on radiologic accumulation of lesions on MRI when she was on disease-modifying treatment with glatiramer acetate.  She was then switched to the Rebif formulation of interferon beta in 2017, and remains on that medication.    Today, she denies any new episodic changes in vision, balance, strength or sensation suggestive of new MS relapse since her last visit to this clinic. She describes her tolerance of Rebif as \"fine, it's OK\" although does relate that the injections are becoming \"monotonous\" for her.    Symptomatically, she relates that she can tell that her left side is getting gradually weaker over time. It is not as easy for her to \"keep up\" with others when walking for longer distances, although she feels that this is less noticeable to others than it is to her. She is not falling.    Regarding bladder symptoms, she has a prescription for oxybutynin 2.5 mg at bedtime but it not using this much as it \"dries everything out\". She endorses some urinary " urgency but does not have incontinence for the most part. Overall, she relates that she is satisfied with current symptom control in this regard.    I reviewed images from MRI scans of the brain and cervical spine performed on 9/10/2020 with the patient. There is no abnormal enhancement with gadolinium contrast throughout the brain and cord parenchyma. In the cervical spine, T2 hyperintense focus in the right side of the cord at C2 is unchanged in comparison to earlier MRI of 9/24/2018. Likewise, multiple periventricular and juxtacortical foci of T2 hyperintensity in the white matter of the cerebral hemispheres bilaterally are unchanged in comparison to previous scan from 8/27/2019.    Past Medical History:  1.  Depression.   2.  Hyperlipidemia.   3.  Allergic rhinitis.   4.  Gastroesophageal reflux disease    PHYSICAL EXAMINATION:   VITAL SIGNS:  Blood pressure 167/79; pulse 81; weight 81.6 kg; height 1.69 meters.   GENERAL:  Well-nourished woman who presents to the examination alone, awake and alert and in no acute distress.  NEUROLOGIC:   MENTAL STATUS:  Alert and oriented times four.   CRANIAL NERVES:  Visual fields are full to confrontation bilaterally.  Extraocular movements are intact with no internuclear ophthalmoplegia.  Facial strength is normal.  Hearing is normal for conversational purposes.  Palate elevation and tongue protrusion are normal.   POWER:  Strength is normal (5/5) in the following muscles/groups bilaterally: Deltoids, biceps, triceps, wrist extensors, finger extensors, first dorsal interosseous, hip flexors hamstrings and anterior tibialis.  REFLEXES:  Reflexes are present and symmetric at biceps, triceps and brachioradialis. Reflexes in the left leg are increased as compared to the right, particularly at the knee.  MOTOR/CEREBELLAR:  There are no tremors, myoclonus or other abnormal movements.  Tone is grossly normal in the limbs.  There is no appendicular ataxia on finger-to-nose testing  and rapid alternating movements are normal in the extremities.  There is no pronator drift in the arms.   GAIT:  The patient is able to ambulate on a flat, level surface with no gross loss of postural stability. She could walk on heels and toes, but not in tandem (did not want to attempt as she did not feel safe).     Assessment/plan:  1. Multiple sclerosis  The patient is clinically and radiologically stable with no evidence of active inflammatory demyelination on current disease modifying therapy with Rebif, and she will continue this medication.     I will perform routine blood tests for monitoring of this medication today to include blood counts, liver function tests and TSH. I will also check a vitamin D level and advise her on supplementation of vitamin D as needed to maintain her level within the goal range of 60-80 mcg/L.    I would like to see her back in one year for a review, or sooner if she has new symptoms that are of concern to her.    2. Gait disturbance  Currently she is compensating reasonably well for her gait difficulties. We will continue to keep a close eye on this symptom over time.    3. Urinary urgency  At present she is not desiring any additional treatment; we can readdress this at any point as needed.        Again, thank you for allowing me to participate in the care of your patient.  Sincerely,    Viraj Santoyo MD

## 2020-09-18 LAB — DEPRECATED CALCIDIOL+CALCIFEROL SERPL-MC: 90 UG/L (ref 20–75)

## 2020-10-17 ENCOUNTER — MYC MEDICAL ADVICE (OUTPATIENT)
Dept: NEUROLOGY | Facility: CLINIC | Age: 65
End: 2020-10-17

## 2020-10-17 NOTE — PROGRESS NOTES
"Referral source: Established patient     Chief complaint: Multiple sclerosis     History of the Present Illness: Ms. Cecile Mays is a 64 year-old woman who returns to the [facility] today for a scheduled follow-up visit after earlier MRI scans of the brain and cervical spine.    The patient's history is as per my previous notes.  She initially developed symptoms of demyelinating disease in 2016 when she had an episode of left facial numbness.  MRI and CSF studies were suggestive of demyelinating disease of the type seen in multiple sclerosis.  Subsequently she was formally diagnosed with multiple sclerosis based on radiologic accumulation of lesions on MRI when she was on disease-modifying treatment with glatiramer acetate.  She was then switched to the Rebif formulation of interferon beta in 2017, and remains on that medication.    Today, she denies any new episodic changes in vision, balance, strength or sensation suggestive of new MS relapse since her last visit to this clinic. She describes her tolerance of Rebif as \"fine, it's OK\" although does relate that the injections are becoming \"monotonous\" for her.    Symptomatically, she relates that she can tell that her left side is getting gradually weaker over time. It is not as easy for her to \"keep up\" with others when walking for longer distances, although she feels that this is less noticeable to others than it is to her. She is not falling.    Regarding bladder symptoms, she has a prescription for oxybutynin 2.5 mg at bedtime but it not using this much as it \"dries everything out\". She endorses some urinary urgency but does not have incontinence for the most part. Overall, she relates that she is satisfied with current symptom control in this regard.    I reviewed images from MRI scans of the brain and cervical spine performed on 9/10/2020 with the patient. There is no abnormal enhancement with gadolinium contrast throughout the brain " and cord parenchyma. In the cervical spine, T2 hyperintense focus in the right side of the cord at C2 is unchanged in comparison to earlier MRI of 9/24/2018. Likewise, multiple periventricular and juxtacortical foci of T2 hyperintensity in the white matter of the cerebral hemispheres bilaterally are unchanged in comparison to previous scan from 8/27/2019.    Past Medical History:  1.  Depression.   2.  Hyperlipidemia.   3.  Allergic rhinitis.   4.  Gastroesophageal reflux disease    PHYSICAL EXAMINATION:   VITAL SIGNS:  Blood pressure 167/79; pulse 81; weight 81.6 kg; height 1.69 meters.   GENERAL:  Well-nourished woman who presents to the examination alone, awake and alert and in no acute distress.  NEUROLOGIC:   MENTAL STATUS:  Alert and oriented times four.   CRANIAL NERVES:  Visual fields are full to confrontation bilaterally.  Extraocular movements are intact with no internuclear ophthalmoplegia.  Facial strength is normal.  Hearing is normal for conversational purposes.  Palate elevation and tongue protrusion are normal.   POWER:  Strength is normal (5/5) in the following muscles/groups bilaterally: Deltoids, biceps, triceps, wrist extensors, finger extensors, first dorsal interosseous, hip flexors hamstrings and anterior tibialis.  REFLEXES:  Reflexes are present and symmetric at biceps, triceps and brachioradialis. Reflexes in the left leg are increased as compared to the right, particularly at the knee.  MOTOR/CEREBELLAR:  There are no tremors, myoclonus or other abnormal movements.  Tone is grossly normal in the limbs.  There is no appendicular ataxia on finger-to-nose testing and rapid alternating movements are normal in the extremities.  There is no pronator drift in the arms.   GAIT:  The patient is able to ambulate on a flat, level surface with no gross loss of postural stability. She could walk on heels and toes, but not in tandem (did not want to attempt as she did not feel safe).      Assessment/plan:    1. Multiple sclerosis  The patient is clinically and radiologically stable with no evidence of active inflammatory demyelination on current disease modifying therapy with Rebif, and she will continue this medication.     I will perform routine blood tests for monitoring of this medication today to include blood counts, liver function tests and TSH. I will also check a vitamin D level and advise her on supplementation of vitamin D as needed to maintain her level within the goal range of 60-80 mcg/L.    I would like to see her back in one year for a review, or sooner if she has new symptoms that are of concern to her.    2. Gait disturbance  Currently she is compensating reasonably well for her gait difficulties. We will continue to keep a close eye on this symptom over time.    3. Urinary urgency  At present she is not desiring any additional treatment; we can readdress this at any point as needed.

## 2020-11-13 ENCOUNTER — VIRTUAL VISIT (OUTPATIENT)
Dept: FAMILY MEDICINE | Facility: CLINIC | Age: 65
End: 2020-11-13
Payer: MEDICARE

## 2020-11-13 DIAGNOSIS — G35 MULTIPLE SCLEROSIS (H): Primary | ICD-10-CM

## 2020-11-13 DIAGNOSIS — H53.9 VISION CHANGES: ICD-10-CM

## 2020-11-13 DIAGNOSIS — N39.41 URGE INCONTINENCE: ICD-10-CM

## 2020-11-13 DIAGNOSIS — E78.5 HYPERLIPIDEMIA LDL GOAL <130: ICD-10-CM

## 2020-11-13 PROCEDURE — 99443 PR PHYSICIAN TELEPHONE EVALUATION 21-30 MIN: CPT | Mod: 95 | Performed by: FAMILY MEDICINE

## 2020-11-13 RX ORDER — MIRABEGRON 25 MG/1
25 TABLET, FILM COATED, EXTENDED RELEASE ORAL DAILY
Qty: 30 TABLET | Refills: 1 | Status: SHIPPED | OUTPATIENT
Start: 2020-11-13 | End: 2021-01-05

## 2020-11-13 RX ORDER — ZINC GLUCONATE 50 MG
50 TABLET ORAL DAILY
COMMUNITY
Start: 2020-11-13 | End: 2021-03-11

## 2020-11-13 NOTE — PATIENT INSTRUCTIONS
1.  Start mirabegron on once daily for urinary urgency.  This is a low dose so if side effects do not happen and it does not work, we can increase dose.  If it works, let us know so we can order 90 tablets next time.  2.  Schedule laboratory testing and come fasting.  3.  Otherwise continue all medications exactly as you are and I think you are doing well.  4.  Schedule an appointment with somebody within the first 3 or 4 months of next year to establish care.  5.  Have ophthalmology exam as planned.

## 2020-11-25 ENCOUNTER — OFFICE VISIT (OUTPATIENT)
Dept: OPHTHALMOLOGY | Facility: CLINIC | Age: 65
End: 2020-11-25
Attending: OPHTHALMOLOGY
Payer: MEDICARE

## 2020-11-25 DIAGNOSIS — H04.123 DRY EYE SYNDROME OF BOTH EYES: ICD-10-CM

## 2020-11-25 DIAGNOSIS — G35 MULTIPLE SCLEROSIS (H): ICD-10-CM

## 2020-11-25 DIAGNOSIS — Z98.890 H/O LASER ASSISTED IN SITU KERATOMILEUSIS: ICD-10-CM

## 2020-11-25 DIAGNOSIS — H04.123 DRY EYE SYNDROME OF BOTH EYES: Primary | ICD-10-CM

## 2020-11-25 DIAGNOSIS — E78.5 HYPERLIPIDEMIA LDL GOAL <130: ICD-10-CM

## 2020-11-25 LAB
ANION GAP SERPL CALCULATED.3IONS-SCNC: 6 MMOL/L (ref 3–14)
BASOPHILS # BLD AUTO: 0.1 10E9/L (ref 0–0.2)
BASOPHILS NFR BLD AUTO: 0.9 %
BUN SERPL-MCNC: 11 MG/DL (ref 7–30)
CALCIUM SERPL-MCNC: 9.4 MG/DL (ref 8.5–10.1)
CHLORIDE SERPL-SCNC: 111 MMOL/L (ref 94–109)
CHOLEST SERPL-MCNC: 191 MG/DL
CO2 SERPL-SCNC: 26 MMOL/L (ref 20–32)
CREAT SERPL-MCNC: 0.84 MG/DL (ref 0.52–1.04)
DIFFERENTIAL METHOD BLD: NORMAL
EOSINOPHIL NFR BLD AUTO: 3.9 %
ERYTHROCYTE [DISTWIDTH] IN BLOOD BY AUTOMATED COUNT: 12 % (ref 10–15)
GFR SERPL CREATININE-BSD FRML MDRD: 73 ML/MIN/{1.73_M2}
GLUCOSE SERPL-MCNC: 97 MG/DL (ref 70–99)
HCT VFR BLD AUTO: 40.7 % (ref 35–47)
HDLC SERPL-MCNC: 43 MG/DL
HGB BLD-MCNC: 13.1 G/DL (ref 11.7–15.7)
IMM GRANULOCYTES # BLD: 0 10E9/L (ref 0–0.4)
IMM GRANULOCYTES NFR BLD: 0.1 %
LDLC SERPL CALC-MCNC: 114 MG/DL
LYMPHOCYTES # BLD AUTO: 2.8 10E9/L (ref 0.8–5.3)
LYMPHOCYTES NFR BLD AUTO: 39.7 %
MCH RBC QN AUTO: 30 PG (ref 26.5–33)
MCHC RBC AUTO-ENTMCNC: 32.2 G/DL (ref 31.5–36.5)
MCV RBC AUTO: 93 FL (ref 78–100)
MONOCYTES # BLD AUTO: 0.7 10E9/L (ref 0–1.3)
MONOCYTES NFR BLD AUTO: 10.5 %
NEUTROPHILS # BLD AUTO: 3.1 10E9/L (ref 1.6–8.3)
NEUTROPHILS NFR BLD AUTO: 44.9 %
NONHDLC SERPL-MCNC: 148 MG/DL
NRBC # BLD AUTO: 0 10*3/UL
NRBC BLD AUTO-RTO: 0 /100
PLATELET # BLD AUTO: 274 10E9/L (ref 150–450)
POTASSIUM SERPL-SCNC: 4 MMOL/L (ref 3.4–5.3)
RBC # BLD AUTO: 4.36 10E12/L (ref 3.8–5.2)
SODIUM SERPL-SCNC: 143 MMOL/L (ref 133–144)
TRIGL SERPL-MCNC: 172 MG/DL
WBC # BLD AUTO: 6.9 10E9/L (ref 4–11)

## 2020-11-25 PROCEDURE — 80061 LIPID PANEL: CPT | Performed by: FAMILY MEDICINE

## 2020-11-25 PROCEDURE — 36415 COLL VENOUS BLD VENIPUNCTURE: CPT | Performed by: FAMILY MEDICINE

## 2020-11-25 PROCEDURE — 85025 COMPLETE CBC W/AUTO DIFF WBC: CPT | Performed by: FAMILY MEDICINE

## 2020-11-25 PROCEDURE — 99207 PR DROP WITH A PROCEDURE: CPT | Performed by: OPHTHALMOLOGY

## 2020-11-25 PROCEDURE — 80048 BASIC METABOLIC PNL TOTAL CA: CPT | Performed by: FAMILY MEDICINE

## 2020-11-25 PROCEDURE — 68761 CLOSE TEAR DUCT OPENING: CPT | Performed by: OPHTHALMOLOGY

## 2020-11-25 PROCEDURE — G0463 HOSPITAL OUTPT CLINIC VISIT: HCPCS

## 2020-11-25 ASSESSMENT — CONF VISUAL FIELD
OS_NORMAL: 1
OD_NORMAL: 1

## 2020-11-25 ASSESSMENT — SLIT LAMP EXAM - LIDS: COMMENTS: MGD

## 2020-11-25 ASSESSMENT — TONOMETRY
OS_IOP_MMHG: 10
IOP_METHOD: ICARE
OD_IOP_MMHG: 10

## 2020-11-25 ASSESSMENT — REFRACTION_WEARINGRX
OS_ADD: +2.50
OD_SPHERE: +0.25
OS_CYLINDER: +0.25
OS_AXIS: 093
SPECS_TYPE: PAL
OD_AXIS: 120
OS_SPHERE: PLANO
OD_ADD: +2.50
OD_CYLINDER: +0.50

## 2020-11-25 ASSESSMENT — VISUAL ACUITY
OD_CC: 20/40
OS_CC+: -2
METHOD: SNELLEN - LINEAR
OS_CC: 20/30

## 2020-11-25 ASSESSMENT — EXTERNAL EXAM - LEFT EYE: OS_EXAM: NORMAL

## 2020-11-25 ASSESSMENT — EXTERNAL EXAM - RIGHT EYE: OD_EXAM: NORMAL

## 2020-11-25 NOTE — NURSING NOTE
Chief Complaints and History of Present Illnesses   Patient presents with     Follow Up     Chief Complaint(s) and History of Present Illness(es)     Follow Up     Laterality: both eyes    Associated symptoms: dryness.  Negative for floaters, flashes, burning and itching    Treatments tried: artificial tears and warm compresses    Response to treatment: no improvement    Pain scale: 0/10              Comments     Want puntal plugs  Very dry and sore  Doing lid scrubs, rice bag warm compress daily  Refresh 10 times a day LALITA REESE 12:57 PM November 25, 2020

## 2020-11-25 NOTE — PATIENT INSTRUCTIONS
Continue preservative free artificial tears   Start lubricating ointment at bedtime (Refresh PM or Systane PM)  Kae mask 1-2 x/ day 10 minutes  Can try TranquilEyes mask at bedtime.   Omgea 3 high EPA content 2000mg daily   Humidifier

## 2020-11-25 NOTE — PROGRESS NOTES
CC: MARK follow-up     HPI:   Referred by Dr. Fortune for dry eye. H/o LASIK in 2005. States she's had issues with dry eye since LASIK. Feels LE >> RE dry. Has had punctal plug placed a couple of years ago in right lower lid. Feels the two eyes don't work well together. Denies diplopia currently, hasn't had for a while. Occasionally if it occurs, it is relieved by resting eyes. Had been on restasis for a while, feels it did not help.     Interval:  Last visit 4/2019 follow up for dry eye.  Over the interval, patient feels eyes have been very dry affecting vision, especially in extreme lighting conditions.  Xiidra didn't help (trialed x 2).  Restasis did not help either. Found punctal plugs were helpful.      Denies dry mouth, joint pain, or rashes.  Denies new flashes/floaters.  Occasional infrequent diplopia when tired however this may just be blurry vision per patient as she does not see two distinct images.     OphthoMeds:  PFATs at least q1h BE (Refresh w/ Omega)  Warm compresses     POH:  S/p myopic LASIK BE 2005    PMH:  Multiple sclerosis    A&P    1. Dry eye syndrome  -h/o LASIK   -denies dry mouth, joint pain, or rashes.    -cont aggressive PFATs  -start WC  -no improvement with restasis in past  -trial of xiidra BID each eye -- did not feel there was any improvement  -punctal plug present right eye (silicone) less symptomatic in this eye, will place silicone plug left eye 11/25/20  - continue Omega 3 Krill Oil -- discussed 2000mg /day high EPA  - start lubricating ointment at bedtime (eg refresh pm)  - consider trial of Tranquileyes if refractory.    - humidifier during winter.  - future treatments may include serum tears and /or scleral lens if refractory.     2. Diplopia  -currently asymptomatic  -no motilty or alignment issues per patient    3. H/o LASIK BE    4. Refractive Error  -new MRx given today with increased add in bifocal    RTC: 6 months, sooner prn - DFE at that time (over due)    Dimas  Goldberg, MD  Cornea & External Disease Fellow  Department of Ophthalmology and Visual Neurosciences    Attending Physician Attestation:  Complete documentation of historical and exam elements from today's encounter can be found in the full encounter summary report (not reduplicated in this progress note).  I personally obtained the chief complaint(s) and history of present illness.  I confirmed and edited as necessary the review of systems, past medical/surgical history, family history, social history, and examination findings as documented by others; and I examined the patient myself.  I personally reviewed the relevant tests, images, and reports as documented above.  I formulated and edited as necessary the assessment and plan and discussed the findings and management plan with the patient and family. - Reynaldo Ho MD    I personally spent great than 40min with the patient, of which >50% of the time was spent face to face with the patient, counseling and coordinating care with the patient. We discussed the complexity of her diagnosis, the need for further information prior to proceeding with yet another surgery, and the unknown prognosis for the patient at this time.    Reynaldo Ho MD

## 2020-11-30 NOTE — RESULT ENCOUNTER NOTE
Notfied via [employer] labs are ok but maybe Cholesterol and  triglyceride numbers may benefit with meds for 9% risk.

## 2020-12-26 DIAGNOSIS — F33.0 MAJOR DEPRESSIVE DISORDER, RECURRENT EPISODE, MILD (H): ICD-10-CM

## 2020-12-28 RX ORDER — SERTRALINE HYDROCHLORIDE 100 MG/1
TABLET, FILM COATED ORAL
Qty: 90 TABLET | Refills: 0 | Status: SHIPPED | OUTPATIENT
Start: 2020-12-28 | End: 2021-03-11

## 2020-12-28 NOTE — TELEPHONE ENCOUNTER
Prescription approved per [facility] Refill Protocol.    NIMCO GamboaN, RN  [facility]

## 2021-01-03 DIAGNOSIS — N39.41 URGE INCONTINENCE: ICD-10-CM

## 2021-01-05 RX ORDER — MIRABEGRON 25 MG/1
TABLET, FILM COATED, EXTENDED RELEASE ORAL
Qty: 90 TABLET | Refills: 1 | Status: SHIPPED | OUTPATIENT
Start: 2021-01-05 | End: 2021-03-11

## 2021-01-05 NOTE — TELEPHONE ENCOUNTER
Routing refill request to provider for review/approval because:  Labs out of range:  BP    NIMCO GamboaN, RN  [facility]

## 2021-01-09 ENCOUNTER — HEALTH MAINTENANCE LETTER (OUTPATIENT)
Age: 66
End: 2021-01-09

## 2021-03-04 ENCOUNTER — MEDICAL CORRESPONDENCE (OUTPATIENT)
Dept: HEALTH INFORMATION MANAGEMENT | Facility: CLINIC | Age: 66
End: 2021-03-04

## 2021-03-10 ASSESSMENT — ENCOUNTER SYMPTOMS
BREAST MASS: 0
NAUSEA: 0
MYALGIAS: 0
COUGH: 0
ARTHRALGIAS: 0
CHILLS: 0
FEVER: 0
DYSURIA: 0
SHORTNESS OF BREATH: 0
WEAKNESS: 0
HEMATURIA: 0
HEMATOCHEZIA: 0
DIARRHEA: 0
CONSTIPATION: 0
FREQUENCY: 1
HEADACHES: 0
SORE THROAT: 0
NERVOUS/ANXIOUS: 0
JOINT SWELLING: 0
PALPITATIONS: 0
ABDOMINAL PAIN: 0
HEARTBURN: 0
DIZZINESS: 0
PARESTHESIAS: 0

## 2021-03-10 ASSESSMENT — ACTIVITIES OF DAILY LIVING (ADL): CURRENT_FUNCTION: NO ASSISTANCE NEEDED

## 2021-03-11 ENCOUNTER — OFFICE VISIT (OUTPATIENT)
Dept: FAMILY MEDICINE | Facility: CLINIC | Age: 66
End: 2021-03-11
Payer: MEDICARE

## 2021-03-11 VITALS
DIASTOLIC BLOOD PRESSURE: 82 MMHG | BODY MASS INDEX: 28.09 KG/M2 | WEIGHT: 179 LBS | TEMPERATURE: 97.4 F | HEART RATE: 87 BPM | RESPIRATION RATE: 18 BRPM | OXYGEN SATURATION: 95 % | SYSTOLIC BLOOD PRESSURE: 122 MMHG | HEIGHT: 67 IN

## 2021-03-11 DIAGNOSIS — F33.0 MAJOR DEPRESSIVE DISORDER, RECURRENT EPISODE, MILD (H): ICD-10-CM

## 2021-03-11 DIAGNOSIS — Z00.00 ANNUAL PHYSICAL EXAM: Primary | ICD-10-CM

## 2021-03-11 DIAGNOSIS — E55.9 VITAMIN D DEFICIENCY: ICD-10-CM

## 2021-03-11 PROCEDURE — 82306 VITAMIN D 25 HYDROXY: CPT | Performed by: FAMILY MEDICINE

## 2021-03-11 PROCEDURE — 99213 OFFICE O/P EST LOW 20 MIN: CPT | Mod: 25 | Performed by: FAMILY MEDICINE

## 2021-03-11 PROCEDURE — 99396 PREV VISIT EST AGE 40-64: CPT | Performed by: FAMILY MEDICINE

## 2021-03-11 PROCEDURE — 36415 COLL VENOUS BLD VENIPUNCTURE: CPT | Performed by: FAMILY MEDICINE

## 2021-03-11 RX ORDER — BUPROPION HYDROCHLORIDE 150 MG/1
150 TABLET ORAL EVERY MORNING
COMMUNITY
End: 2021-09-23

## 2021-03-11 RX ORDER — SERTRALINE HYDROCHLORIDE 100 MG/1
100 TABLET, FILM COATED ORAL DAILY
Qty: 90 TABLET | Refills: 0 | Status: SHIPPED | OUTPATIENT
Start: 2021-03-11 | End: 2021-06-28

## 2021-03-11 ASSESSMENT — ENCOUNTER SYMPTOMS
FREQUENCY: 1
DYSURIA: 0
ABDOMINAL PAIN: 0
HEARTBURN: 0
DIARRHEA: 0
SORE THROAT: 0
PARESTHESIAS: 0
FEVER: 0
PALPITATIONS: 0
JOINT SWELLING: 0
BREAST MASS: 0
DIZZINESS: 0
ARTHRALGIAS: 0
MYALGIAS: 0
WEAKNESS: 0
COUGH: 0
CHILLS: 0
HEMATURIA: 0
HEMATOCHEZIA: 0
CONSTIPATION: 0
NAUSEA: 0
HEADACHES: 0
NERVOUS/ANXIOUS: 0
SHORTNESS OF BREATH: 0

## 2021-03-11 ASSESSMENT — PAIN SCALES - GENERAL: PAINLEVEL: NO PAIN (0)

## 2021-03-11 ASSESSMENT — ANXIETY QUESTIONNAIRES
7. FEELING AFRAID AS IF SOMETHING AWFUL MIGHT HAPPEN: NOT AT ALL
IF YOU CHECKED OFF ANY PROBLEMS ON THIS QUESTIONNAIRE, HOW DIFFICULT HAVE THESE PROBLEMS MADE IT FOR YOU TO DO YOUR WORK, TAKE CARE OF THINGS AT HOME, OR GET ALONG WITH OTHER PEOPLE: NOT DIFFICULT AT ALL
1. FEELING NERVOUS, ANXIOUS, OR ON EDGE: NOT AT ALL
5. BEING SO RESTLESS THAT IT IS HARD TO SIT STILL: NOT AT ALL
2. NOT BEING ABLE TO STOP OR CONTROL WORRYING: SEVERAL DAYS
3. WORRYING TOO MUCH ABOUT DIFFERENT THINGS: NOT AT ALL
6. BECOMING EASILY ANNOYED OR IRRITABLE: SEVERAL DAYS
GAD7 TOTAL SCORE: 3

## 2021-03-11 ASSESSMENT — PATIENT HEALTH QUESTIONNAIRE - PHQ9
5. POOR APPETITE OR OVEREATING: SEVERAL DAYS
SUM OF ALL RESPONSES TO PHQ QUESTIONS 1-9: 2

## 2021-03-11 ASSESSMENT — ACTIVITIES OF DAILY LIVING (ADL): CURRENT_FUNCTION: NO ASSISTANCE NEEDED

## 2021-03-11 ASSESSMENT — MIFFLIN-ST. JEOR: SCORE: 1396.15

## 2021-03-11 NOTE — PROGRESS NOTES
"   SUBJECTIVE:   CC: Cecile Mays is an 65 year old woman who presents for preventive health visit.       Patient has been advised of split billing requirements and indicates understanding: Yes  Healthy Habits:     In general, how would you rate your overall health?  Good    Frequency of exercise:  None    Do you usually eat at least 4 servings of fruit and vegetables a day, include whole grains    & fiber and avoid regularly eating high fat or \"junk\" foods?  Yes    Taking medications regularly:  Yes    Medication side effects:  None    Ability to successfully perform activities of daily living:  No assistance needed    Home Safety:  No safety concerns identified    Hearing Impairment:  No hearing concerns    In the past 6 months, have you been bothered by leaking of urine? Yes    In general, how would you rate your overall mental or emotional health?  Good      PHQ-2 Total Score: 0    Additional concerns today:  No    discused covid vacc  reivewed MS  reivewed high vit d level, due for recheck  diong well  Mood stable  Reviewed freq UTI, now better on cranberry      Today's PHQ-2 Score:   PHQ-2 ( 1999 Pfizer) 3/11/2021   Q1: Little interest or pleasure in doing things 0   Q2: Feeling down, depressed or hopeless 1   PHQ-2 Score 1   Q1: Little interest or pleasure in doing things -   Q2: Feeling down, depressed or hopeless -   PHQ-2 Score -       Abuse: Current or Past (Physical, Sexual or Emotional) - No  Do you feel safe in your environment? Yes        Social History     Tobacco Use     Smoking status: Never Smoker     Smokeless tobacco: Never Used   Substance Use Topics     Alcohol use: No     Alcohol/week: 0.0 standard drinks     If you drink alcohol do you typically have >3 drinks per day or >7 drinks per week? No    No flowsheet data found.      Reviewed orders with patient.  Reviewed health maintenance and updated orders accordingly -       Breast CA Risk Screening:  Breast CA Risk Assessment " "(FHS-7) 3/10/2021   Do you have a family history of breast, colon, or ovarian cancer? No / Unknown           Pertinent mammograms are reviewed under the imaging tab.    History of abnormal Pap smear:   PAP / HPV Latest Ref Rng & Units 2/5/2018 3/2/2015 3/26/2012   PAP - NIL NIL NIL   HPV 16 DNA NEG:Negative Negative - -   HPV 18 DNA NEG:Negative Negative - -   OTHER HR HPV NEG:Negative Negative - -     Reviewed and updated as needed this visit by clinical staff  Tobacco  Allergies  Meds              Reviewed and updated as needed this visit by Provider                    Review of Systems   Constitutional: Negative for chills and fever.   HENT: Negative for congestion, ear pain, hearing loss and sore throat.    Eyes: Negative for visual disturbance.   Respiratory: Negative for cough and shortness of breath.    Cardiovascular: Negative for chest pain, palpitations and peripheral edema.   Gastrointestinal: Negative for abdominal pain, constipation, diarrhea, heartburn, hematochezia and nausea.   Breasts:  Negative for tenderness, breast mass and discharge.   Genitourinary: Positive for frequency. Negative for dysuria, genital sores, hematuria, pelvic pain, urgency, vaginal bleeding and vaginal discharge.   Musculoskeletal: Negative for arthralgias, joint swelling and myalgias.   Skin: Negative for rash.   Neurological: Negative for dizziness, weakness, headaches and paresthesias.   Psychiatric/Behavioral: Negative for mood changes. The patient is not nervous/anxious.           OBJECTIVE:   /82   Pulse 87   Temp 97.4  F (36.3  C) (Temporal)   Resp 18   Ht 1.704 m (5' 7.1\")   Wt 81.2 kg (179 lb)   LMP 02/05/2008   SpO2 95%   BMI 27.95 kg/m    Physical Exam  GENERAL: healthy, alert and no distress  NECK: no adenopathy, no asymmetry, masses, or scars and thyroid normal to palpation  RESP: lungs clear to auscultation - no rales, rhonchi or wheezes  CV: regular rate and rhythm, normal S1 S2, no S3 or S4, no " "murmur, click or rub, no peripheral edema and peripheral pulses strong  ABDOMEN: soft, nontender, no hepatosplenomegaly, no masses and bowel sounds normal  MS: no gross musculoskeletal defects noted, no edema    Diagnostic Test Results:  Labs reviewed in Epic    ASSESSMENT/PLAN:       ICD-10-CM    1. Annual physical exam  Z00.00    2. Major depressive disorder, recurrent episode, mild (H)  F33.0 sertraline (ZOLOFT) 100 MG tablet   3. Vitamin D deficiency  E55.9 25 OH Vit D therapy monitoring     CANCELED: Vitamin D Deficiency     Annual topics covered, including immunizations and relevant cancer screening  Long discussion about the benefits of Covid vaccination, especially for somebody on immune suppressant drugs for her MS  Continue to follow with neurology for her MS  Stable MDD continue on sertraline  I noted she has an elevated vitamin D reading, and was subsequently reduced on her dosing but is due for recheck    COUNSELING:  Reviewed preventive health counseling, as reflected in patient instructions    Estimated body mass index is 27.95 kg/m  as calculated from the following:    Height as of this encounter: 1.704 m (5' 7.1\").    Weight as of this encounter: 81.2 kg (179 lb).        She reports that she has never smoked. She has never used smokeless tobacco.      Counseling Resources:  ATP IV Guidelines  Pooled Cohorts Equation Calculator  Breast Cancer Risk Calculator  BRCA-Related Cancer Risk Assessment: FHS-7 Tool  FRAX Risk Assessment  ICSI Preventive Guidelines  Dietary Guidelines for Americans, 2010  USDA's MyPlate  ASA Prophylaxis  Lung CA Screening    Valdez Hurd MD  [facility]  "

## 2021-03-12 ASSESSMENT — ANXIETY QUESTIONNAIRES: GAD7 TOTAL SCORE: 3

## 2021-03-13 ENCOUNTER — IMMUNIZATION (OUTPATIENT)
Dept: FAMILY MEDICINE | Facility: CLINIC | Age: 66
End: 2021-03-13
Payer: MEDICARE

## 2021-03-13 LAB
DEPRECATED CALCIDIOL+CALCIFEROL SERPL-MC: <70 UG/L (ref 20–75)
VITAMIN D2 SERPL-MCNC: <5 UG/L
VITAMIN D3 SERPL-MCNC: 65 UG/L

## 2021-03-13 PROCEDURE — 0011A PR COVID VAC MODERNA 100 MCG/0.5 ML IM: CPT

## 2021-03-13 PROCEDURE — 91301 PR COVID VAC MODERNA 100 MCG/0.5 ML IM: CPT

## 2021-04-10 ENCOUNTER — IMMUNIZATION (OUTPATIENT)
Dept: FAMILY MEDICINE | Facility: CLINIC | Age: 66
End: 2021-04-10
Attending: FAMILY MEDICINE
Payer: MEDICARE

## 2021-04-10 PROCEDURE — 0012A PR COVID VAC MODERNA 100 MCG/0.5 ML IM: CPT

## 2021-04-10 PROCEDURE — 91301 PR COVID VAC MODERNA 100 MCG/0.5 ML IM: CPT

## 2021-04-19 ENCOUNTER — HOSPITAL ENCOUNTER (OUTPATIENT)
Dept: MAMMOGRAPHY | Facility: CLINIC | Age: 66
Discharge: HOME | End: 2021-04-19
Attending: FAMILY MEDICINE | Admitting: FAMILY MEDICINE
Payer: MEDICARE

## 2021-04-19 DIAGNOSIS — Z12.31 VISIT FOR SCREENING MAMMOGRAM: ICD-10-CM

## 2021-04-19 PROCEDURE — 77063 BREAST TOMOSYNTHESIS BI: CPT

## 2021-06-25 DIAGNOSIS — F33.0 MAJOR DEPRESSIVE DISORDER, RECURRENT EPISODE, MILD (H): ICD-10-CM

## 2021-06-28 RX ORDER — SERTRALINE HYDROCHLORIDE 100 MG/1
TABLET, FILM COATED ORAL
Qty: 90 TABLET | Refills: 1 | Status: SHIPPED | OUTPATIENT
Start: 2021-06-28 | End: 2021-12-17

## 2021-07-19 ENCOUNTER — TELEPHONE (OUTPATIENT)
Dept: NEUROLOGY | Facility: CLINIC | Age: 66
End: 2021-07-19

## 2021-07-19 NOTE — TELEPHONE ENCOUNTER
PA Initiation    Medication: Rebif Rebidose 44MCG/0.5ML auto-injectors (PENDING)  Insurance Company: [employer] Part D - Phone [phone] Fax [phone]  Pharmacy Filling the Rx: [facility] - [place], [place] - [address]  Filling Pharmacy Phone:    Filling Pharmacy Fax:    Start Date: 7/19/2021        Thank you,    Josefina Wick [facility]  Specialty Pharmacy Clinic [facility] Surgery [address] [address]  Ph: [phone] Fax: [phone]  [email or web address]

## 2021-07-19 NOTE — TELEPHONE ENCOUNTER
Prior Authorization Approval    Authorization Effective Date: 4/20/2021  Authorization Expiration Date: 7/19/2022  Medication: Rebif Rebidose 44MCG/0.5ML auto-injectors (APPROVED)  Approved Dose/Quantity:  28 days  Reference #:     Insurance Company: [employer] Part D - Phone [phone] Fax [phone]  Expected CoPay:       CoPay Card Available: No    Foundation Assistance Needed:    Which Pharmacy is filling the prescription (Not needed for infusion/clinic administered): [facility] - [address]  Pharmacy Notified:    Patient Notified:            Thank you,    Josefina Wick h-T  Specialty Pharmacy Clinic [facility] Surgery [address] [address]  Ph: [phone] Fax: [phone]  [email or web address]

## 2021-08-16 ENCOUNTER — OFFICE VISIT (OUTPATIENT)
Dept: OPHTHALMOLOGY | Facility: CLINIC | Age: 66
End: 2021-08-16
Attending: OPHTHALMOLOGY
Payer: MEDICARE

## 2021-08-16 DIAGNOSIS — H04.123 DRY EYE SYNDROME OF BOTH EYES: Primary | ICD-10-CM

## 2021-08-16 DIAGNOSIS — H52.213 IRREGULAR ASTIGMATISM OF BOTH EYES: ICD-10-CM

## 2021-08-16 PROCEDURE — 99215 OFFICE O/P EST HI 40 MIN: CPT | Mod: 25 | Performed by: OPHTHALMOLOGY

## 2021-08-16 PROCEDURE — 92025 CPTRIZED CORNEAL TOPOGRAPHY: CPT | Performed by: OPHTHALMOLOGY

## 2021-08-16 PROCEDURE — G0463 HOSPITAL OUTPT CLINIC VISIT: HCPCS | Mod: 25

## 2021-08-16 PROCEDURE — 68761 CLOSE TEAR DUCT OPENING: CPT | Performed by: OPHTHALMOLOGY

## 2021-08-16 ASSESSMENT — CONF VISUAL FIELD
OD_NORMAL: 1
OS_NORMAL: 1
METHOD: COUNTING FINGERS

## 2021-08-16 ASSESSMENT — REFRACTION_WEARINGRX
SPECS_TYPE: PAL
OD_CYLINDER: +0.50
OD_ADD: +2.50
OD_SPHERE: +0.25
OD_AXIS: 120
OS_ADD: +2.50
OS_CYLINDER: +0.25
OS_SPHERE: PLANO
OS_AXIS: 093

## 2021-08-16 ASSESSMENT — VISUAL ACUITY
CORRECTION_TYPE: GLASSES
OD_CC: 20/40
METHOD: SNELLEN - LINEAR
OS_CC: 20/30

## 2021-08-16 ASSESSMENT — TONOMETRY
OS_IOP_MMHG: 10
IOP_METHOD: ICARE
OD_IOP_MMHG: 10

## 2021-08-16 ASSESSMENT — SLIT LAMP EXAM - LIDS: COMMENTS: MGD

## 2021-08-16 ASSESSMENT — EXTERNAL EXAM - LEFT EYE: OS_EXAM: NORMAL

## 2021-08-16 ASSESSMENT — EXTERNAL EXAM - RIGHT EYE: OD_EXAM: NORMAL

## 2021-08-16 NOTE — PROGRESS NOTES
"CC: MARK follow-up     HPI:    Referred by Dr. Fortune for dry eye. H/o LASIK in 2005. States she's had issues with dry eye since LASIK. Feels LE >> RE dry. Has had punctal plug placed a couple of years ago in right lower lid. Feels the two eyes don't work well together. Denies diplopia currently, hasn't had for a while. Occasionally if it occurs, it is relieved by resting eyes. Had been on restasis for a while, feels it did not help.     Interval:  Last visit 11/2020 follow up for dry eye. Eyes feel dry. Reports \"eyes  don't work together\" since she got LASIK in 2005. She covers right eye to see more clearly out of the left eyes. Patient reports that driving and shopping is tough hard time making out details like reading menu. Occasional infrequent diplopia when tired however this may just be blurry vision per patient as she does not see two distinct images.  Denies new flashes/floaters.       OphthoMeds:  PFATs prn uses about 4x/day both eyes (Refresh w/ Omega)  Warm compresses   Ocuvite wipes  Omega 3 supplement    POH:  S/p myopic LASIK BE 2005    PMH:  Multiple sclerosis    A&P    1. Dry eye syndrome  -h/o LASIK   -failed Xiidra and Restasis  -denies dry mouth, joint pain (some back pain), or rashes.    -cont aggressive PFATs  -continue start WC  -no improvement with restasis or xiidra in past  -punctal plug present right eye (silicone) less symptomatic in this eye, will place silicone plug left eye 11/25/20  -recommend collagen plug LLL 0.3mm x 2 today 8/16/21  - continue Omega 3 Krill Oil -- discussed 2000mg /day high EPA  - consider trial of Tranquileyes if refractory.    - humidifier during winter.  - future treatments may include serum tears and /or scleral lens if refractory  - recommend scleral trial - if no improvement in vision, very unlikely vision loss and abnormal fields are related to dry eyes  - discussed repeat visual fields and evaluation by Dr. Fortune  - if vision improved with scleral " lenses - consider long term BCL vs scleral lens wear vs. Serum tears.    2. Diplopia  -currently asymptomatic  -no motilty or alignment issues per patient    3. H/o LASIK BE    4. Refractive Error  -new MRx given today with increased add in bifocal    RTC: 6 months, sooner naresh Roman MD  Ophthalmology Resident, PGY-3  [facility]     Attending Physician Attestation:  Complete documentation of historical and exam elements from today's encounter can be found in the full encounter summary report (not reduplicated in this progress note).  I personally obtained the chief complaint(s) and history of present illness.  I confirmed and edited as necessary the review of systems, past medical/surgical history, family history, social history, and examination findings as documented by others; and I examined the patient myself.  I personally reviewed the relevant tests, images, and reports as documented above.  I formulated and edited as necessary the assessment and plan and discussed the findings and management plan with the patient and family. I personally reviewed the ophthalmic test(s) associated with this encounter, agree with the interpretation(s) as documented by the resident/fellow, and have edited the corresponding report(s) as necessary. I was present for the key portions of the procedure and immediately available for the remainder. - Reynaldo Ho MD    I personally spent great than 40min with the patient, of which >50% of the time was spent face to face with the patient, counseling and coordinating care with the patient. This excludes time spent performing the procedure. We discussed the complexity of her diagnosis, the need for further information, repeat testing, close monitoring, aggressive treatment, and the unknown prognosis for the patient at this time.    Reynaldo Ho MD

## 2021-08-16 NOTE — NURSING NOTE
Chief Complaints and History of Present Illnesses   Patient presents with     Dry Eye(s)     9 month follow up both eyes.     Chief Complaint(s) and History of Present Illness(es)     Dry Eye(s)     Comments: 9 month follow up both eyes.              Comments       Pt states vision is the same as last visit. No eye pain today.  Dryness in both eyes LE>RE. No redness.    NINA Ortiz August 16, 2021 1:51 PM

## 2021-08-24 ENCOUNTER — OFFICE VISIT (OUTPATIENT)
Dept: OPTOMETRY | Facility: CLINIC | Age: 66
End: 2021-08-24
Payer: COMMERCIAL

## 2021-08-24 DIAGNOSIS — H53.8 BLURRED VISION: Primary | ICD-10-CM

## 2021-08-24 DIAGNOSIS — H04.129 DRY EYE: ICD-10-CM

## 2021-08-24 DIAGNOSIS — H52.213 IRREGULAR ASTIGMATISM OF BOTH EYES: ICD-10-CM

## 2021-08-24 ASSESSMENT — REFRACTION_CURRENTRX
OS_SPHERE: -2.50
OD_BRAND: ONEFIT
OD_BASECURVE: 7.6
OD_DIAMETER: 14.9
OD_SPHERE: -3.00
OS_BRAND: ONEFIT
OS_BASECURVE: 7.7
OS_DIAMETER: 14.9

## 2021-08-24 ASSESSMENT — VISUAL ACUITY
CORRECTION_TYPE: GLASSES
OD_CC: 20/30-
METHOD: SNELLEN - LINEAR
OS_CC: 20/30

## 2021-08-24 ASSESSMENT — SLIT LAMP EXAM - LIDS: COMMENTS: MGD

## 2021-08-24 ASSESSMENT — REFRACTION_WEARINGRX
OS_CYLINDER: +0.50
OS_SPHERE: -0.25
OS_ADD: +3.00
OD_CYLINDER: +0.25
OS_AXIS: 090
OD_AXIS: 120
OD_ADD: +3.00
OD_SPHERE: +0.25

## 2021-08-24 ASSESSMENT — EXTERNAL EXAM - LEFT EYE: OS_EXAM: NORMAL

## 2021-08-24 ASSESSMENT — EXTERNAL EXAM - RIGHT EYE: OD_EXAM: NORMAL

## 2021-08-24 NOTE — PROGRESS NOTES
"A/P  1.) Blurred vision with Dry Eye/Irregular Astig  -Mild irregular astig on doron, h/o LASIK. Here for hard lens OR to determine extent of cornea in blurred vision  -Minimal improvement in acuity with scleral lens trial today. She does not improved comfort with dry eye but no significant vision improvement  -Main complaint is eyes feel like they are \"fighting\" with each other. Covers right eye to get better vision out of left eye. Wearing prism glasses, seemed to take around 2BU right eye on rough measurements today  -She also has MS and has seen Dr. Fortune 2 years ago    I do not think the majority of her blurred vision/visual complaints are related to cornea/ocular surface. Would hold on any CL wear at this time. Rec eval with orthoptist and Dr. Fortune    "

## 2021-09-02 NOTE — PROGRESS NOTES
Prior to injection, verified patient identity using patient's name and date of birth.  Due to injection administration, patient instructed to remain in clinic for 15 minutes  afterwards, and to report any adverse reaction to me immediately.    1st Dose of Shingrix     Drug Amount Wasted:  None.  Vial/Syringe: Single dose vial  Expiration Date:  06/09/21  .Hillary Esteves CMA       Hypernatremia

## 2021-09-23 ENCOUNTER — LAB (OUTPATIENT)
Dept: LAB | Facility: CLINIC | Age: 66
End: 2021-09-23
Payer: MEDICARE

## 2021-09-23 ENCOUNTER — OFFICE VISIT (OUTPATIENT)
Dept: NEUROLOGY | Facility: CLINIC | Age: 66
End: 2021-09-23
Attending: PSYCHIATRY & NEUROLOGY
Payer: MEDICARE

## 2021-09-23 VITALS
SYSTOLIC BLOOD PRESSURE: 129 MMHG | DIASTOLIC BLOOD PRESSURE: 86 MMHG | WEIGHT: 172.5 LBS | HEART RATE: 74 BPM | BODY MASS INDEX: 27.07 KG/M2 | HEIGHT: 67 IN

## 2021-09-23 DIAGNOSIS — G35 MS (MULTIPLE SCLEROSIS) (H): Primary | ICD-10-CM

## 2021-09-23 DIAGNOSIS — R26.9 GAIT DISTURBANCE: ICD-10-CM

## 2021-09-23 DIAGNOSIS — H53.8 BLURRED VISION: ICD-10-CM

## 2021-09-23 DIAGNOSIS — G35 MS (MULTIPLE SCLEROSIS) (H): ICD-10-CM

## 2021-09-23 DIAGNOSIS — R39.15 URINARY URGENCY: ICD-10-CM

## 2021-09-23 LAB
ALBUMIN SERPL-MCNC: 3.5 G/DL (ref 3.4–5)
ALP SERPL-CCNC: 127 U/L (ref 40–150)
ALT SERPL W P-5'-P-CCNC: 30 U/L (ref 0–50)
AST SERPL W P-5'-P-CCNC: 19 U/L (ref 0–45)
BASOPHILS # BLD AUTO: 0 10E3/UL (ref 0–0.2)
BASOPHILS NFR BLD AUTO: 1 %
BILIRUB DIRECT SERPL-MCNC: 0.1 MG/DL (ref 0–0.2)
BILIRUB SERPL-MCNC: 0.4 MG/DL (ref 0.2–1.3)
EOSINOPHIL # BLD AUTO: 0.1 10E3/UL (ref 0–0.7)
EOSINOPHIL NFR BLD AUTO: 2 %
ERYTHROCYTE [DISTWIDTH] IN BLOOD BY AUTOMATED COUNT: 12.5 % (ref 10–15)
HCT VFR BLD AUTO: 40 % (ref 35–47)
HGB BLD-MCNC: 13.1 G/DL (ref 11.7–15.7)
IMM GRANULOCYTES # BLD: 0 10E3/UL
IMM GRANULOCYTES NFR BLD: 0 %
LYMPHOCYTES # BLD AUTO: 2.1 10E3/UL (ref 0.8–5.3)
LYMPHOCYTES NFR BLD AUTO: 41 %
MCH RBC QN AUTO: 30.5 PG (ref 26.5–33)
MCHC RBC AUTO-ENTMCNC: 32.8 G/DL (ref 31.5–36.5)
MCV RBC AUTO: 93 FL (ref 78–100)
MONOCYTES # BLD AUTO: 0.6 10E3/UL (ref 0–1.3)
MONOCYTES NFR BLD AUTO: 11 %
NEUTROPHILS # BLD AUTO: 2.2 10E3/UL (ref 1.6–8.3)
NEUTROPHILS NFR BLD AUTO: 45 %
NRBC # BLD AUTO: 0 10E3/UL
NRBC BLD AUTO-RTO: 0 /100
PLATELET # BLD AUTO: 231 10E3/UL (ref 150–450)
PROT SERPL-MCNC: 6.9 G/DL (ref 6.8–8.8)
RBC # BLD AUTO: 4.29 10E6/UL (ref 3.8–5.2)
WBC # BLD AUTO: 5 10E3/UL (ref 4–11)

## 2021-09-23 PROCEDURE — 85025 COMPLETE CBC W/AUTO DIFF WBC: CPT | Performed by: PATHOLOGY

## 2021-09-23 PROCEDURE — G0463 HOSPITAL OUTPT CLINIC VISIT: HCPCS

## 2021-09-23 PROCEDURE — 99214 OFFICE O/P EST MOD 30 MIN: CPT | Mod: GC | Performed by: PSYCHIATRY & NEUROLOGY

## 2021-09-23 PROCEDURE — 80076 HEPATIC FUNCTION PANEL: CPT | Performed by: PATHOLOGY

## 2021-09-23 PROCEDURE — 36415 COLL VENOUS BLD VENIPUNCTURE: CPT | Performed by: PATHOLOGY

## 2021-09-23 ASSESSMENT — PATIENT HEALTH QUESTIONNAIRE - PHQ9: SUM OF ALL RESPONSES TO PHQ QUESTIONS 1-9: 0

## 2021-09-23 ASSESSMENT — PAIN SCALES - GENERAL: PAINLEVEL: NO PAIN (0)

## 2021-09-23 ASSESSMENT — MIFFLIN-ST. JEOR: SCORE: 1366.46

## 2021-09-23 NOTE — PROGRESS NOTES
"Referral source: Established patient     Chief complaint: Multiple sclerosis     History of the Present Illness: Ms. Cecile Mays is a 65 year-old woman who returns to the [facility] today for a scheduled follow-up visit.     Per the follow up note on 10/17/2020: She initially developed symptoms of demyelinating disease in 2016 when she had an episode of left facial numbness and left eye blurry vision.  MRI and CSF studies were suggestive of demyelinating disease of the type seen in multiple sclerosis.  Subsequently she was formally diagnosed with multiple sclerosis based on radiologic accumulation of lesions on MRI when she was on disease-modifying treatment with glatiramer acetate.  She was then switched to the Rebif formulation of interferon beta in 2017, and remains on that medication.    Interval history:  She saw the Dr. Ho in the eye clinic couple times since her last visit for blurry vision \" eyes fighting each other\". She has to cover the right eye to be able to see with the left eye. She is wearing prism glasses. Her last visit with him was on 8/24. He think that the blurry vision is related to cornea/ocular surface. The patient think these eye symptoms are related to MS as these are the presenting symptoms.  She has to cover the right eye to see with her left one. She does not feel comfortable when she covers the left eye though. The eye problem is steady but exacerbates with hot weather and tachycardia.     She continues to have left cheek numbness, balance issues but she does not fall, walking is slower and her left side is weaker compared to the right side but not worse than before. She continues to have urgency. She stopped the oxybutynin after short period of time because of the dry eye, dry mouth and dry nose. She likes mirabefron well but she could not afford it. No clear episodes of neurological deficit.     Memory is getting slightly worse specially when she is tired. " "    Vit D in March 2021, 65. Triglyceride 172 and . CBC and BMP is fine.     She describes her tolerance of Rebif as \"fine, it's OK\" although does relate that the injections are becoming \"monotonous\" for her.    The last MRI scans of the brain and cervical spine performed on 9/10/2020. There is no abnormal enhancement with gadolinium contrast throughout the brain and cord parenchyma. Multiple periventricular and juxtacortical foci of T2 hyperintensity in the white matter of the cerebral hemispheres bilaterally are unchanged in comparison to previous scan from 8/27/2019. In the cervical spine, T2 hyperintense focus in the right side of the cord at C2 is unchanged in comparison to earlier MRI of 9/24/2018.     Past Medical History:  1.  Depression.   2.  Hyperlipidemia.   3.  Allergic rhinitis.   4.  Gastroesophageal reflux disease    PHYSICAL EXAMINATION:   VITAL SIGNS:  /86 (BP Location: Left arm, Patient Position: Chair, Cuff Size: Adult Regular)   Pulse 74   Ht 1.704 m (5' 7.09\")   Wt 78.2 kg (172 lb 8 oz)   LMP 02/05/2008   BMI 26.95 kg/m      GENERAL:  Well-nourished woman who presents to the examination alone, awake and alert and in no acute distress.  NEUROLOGIC:   MENTAL STATUS:  Alert and oriented times four.   CRANIAL NERVES:  Visual fields are full to confrontation bilaterally.  Extraocular movements are intact with no internuclear ophthalmoplegia.  Facial strength is normal.  Hearing is normal for conversational purposes.  Palate elevation and tongue protrusion are normal.   POWER:  Strength is normal (5/5) in the following muscles/groups bilaterally: Deltoids, biceps, triceps, wrist extensors, finger extensors, first dorsal interosseous, hip flexors hamstrings and anterior tibialis.  REFLEXES:  Reflexes are present and symmetric at biceps, triceps and brachioradialis. Reflexes in the left leg are slightly  increased as compared to the right, particularly at the knee.  MOTOR/CEREBELLAR:  " There are no tremors, myoclonus or other abnormal movements.  Tone is grossly normal in the limbs.  There is no appendicular ataxia on finger-to-nose testing and rapid alternating movements are normal in the extremities.  There is no pronator drift in the arms.   GAIT:  The patient is able to ambulate on a flat, level surface with no gross loss of postural stability. She could walk on heels and toes, she was able to do tandem with my support then was able to it without any support but was difficult    Assessment/plan:    1. Multiple sclerosis  The patient is clinically stable with no evidence of active inflammatory demyelination on current disease modifying therapy with Rebif, and she will continue this medication.     - Repeat MRI brain and cervical spine in a year  - Repeat CBC,  LFT    Vit D level is good. She will continue Vit D.    2. Gait disturbance  Currently she is compensating reasonably well for her gait difficulties. We will continue to keep a close eye on this symptom over time.    3. Urinary urgency  At present she is not desiring any additional treatment; we can readdress this at any point as needed.    4. Visual symptoms  She saw the eye doctor twice since her last visit with us. The doctor thinks the symptoms related to focal eye issues but the patient thinks the symptoms related to MS as they started since the first episode in 2016. Visual symptoms do not affect her function but are irritating. She is still driving but she avoids driving when the symptoms are bothersome. She is referred by the current eye doctor to Dr. Fortune   - follow up with Dr. Fortune as scheduled       Patient is seen and discussed with MD Fabrizio Reyes MD  PGY 4 neurology    Attending physician: I saw and evaluated the patient with the resident and I agree with her findings and plan of care as documented above.     LARON Santoyo MD

## 2021-09-23 NOTE — LETTER
"9/23/2021      RE: Cecile Mays  [address]  [facility] [address]     Referral source: Established patient     Chief complaint: Multiple sclerosis     History of the Present Illness: Ms. Cecile Mays is a 65 year-old woman who returns to the [facility] today for a scheduled follow-up visit.     Per the follow up note on 10/17/2020: She initially developed symptoms of demyelinating disease in 2016 when she had an episode of left facial numbness and left eye blurry vision.  MRI and CSF studies were suggestive of demyelinating disease of the type seen in multiple sclerosis.  Subsequently she was formally diagnosed with multiple sclerosis based on radiologic accumulation of lesions on MRI when she was on disease-modifying treatment with glatiramer acetate.  She was then switched to the Rebif formulation of interferon beta in 2017, and remains on that medication.    Interval history:  She saw the Dr. Ho in the eye clinic couple times since her last visit for blurry vision \" eyes fighting each other\". She has to cover the right eye to be able to see with the left eye. She is wearing prism glasses. Her last visit with him was on 8/24. He think that the blurry vision is related to cornea/ocular surface. The patient think these eye symptoms are related to MS as these are the presenting symptoms.  She has to cover the right eye to see with her left one. She does not feel comfortable when she covers the left eye though. The eye problem is steady but exacerbates with hot weather and tachycardia.     She continues to have left cheek numbness, balance issues but she does not fall, walking is slower and her left side is weaker compared to the right side but not worse than before. She continues to have urgency. She stopped the oxybutynin after short period of time because of the dry eye, dry mouth and dry nose. She likes mirabefron well but she could not afford it. No clear episodes of " "neurological deficit.     Memory is getting slightly worse specially when she is tired.     Vit D in March 2021, 65. Triglyceride 172 and . CBC and BMP is fine.     She describes her tolerance of Rebif as \"fine, it's OK\" although does relate that the injections are becoming \"monotonous\" for her.    The last MRI scans of the brain and cervical spine performed on 9/10/2020. There is no abnormal enhancement with gadolinium contrast throughout the brain and cord parenchyma. Multiple periventricular and juxtacortical foci of T2 hyperintensity in the white matter of the cerebral hemispheres bilaterally are unchanged in comparison to previous scan from 8/27/2019. In the cervical spine, T2 hyperintense focus in the right side of the cord at C2 is unchanged in comparison to earlier MRI of 9/24/2018.     Past Medical History:  1.  Depression.   2.  Hyperlipidemia.   3.  Allergic rhinitis.   4.  Gastroesophageal reflux disease    PHYSICAL EXAMINATION:   VITAL SIGNS:  /86 (BP Location: Left arm, Patient Position: Chair, Cuff Size: Adult Regular)   Pulse 74   Ht 1.704 m (5' 7.09\")   Wt 78.2 kg (172 lb 8 oz)   LMP 02/05/2008   BMI 26.95 kg/m      GENERAL:  Well-nourished woman who presents to the examination alone, awake and alert and in no acute distress.  NEUROLOGIC:   MENTAL STATUS:  Alert and oriented times four.   CRANIAL NERVES:  Visual fields are full to confrontation bilaterally.  Extraocular movements are intact with no internuclear ophthalmoplegia.  Facial strength is normal.  Hearing is normal for conversational purposes.  Palate elevation and tongue protrusion are normal.   POWER:  Strength is normal (5/5) in the following muscles/groups bilaterally: Deltoids, biceps, triceps, wrist extensors, finger extensors, first dorsal interosseous, hip flexors hamstrings and anterior tibialis.  REFLEXES:  Reflexes are present and symmetric at biceps, triceps and brachioradialis. Reflexes in the left leg are " slightly  increased as compared to the right, particularly at the knee.  MOTOR/CEREBELLAR:  There are no tremors, myoclonus or other abnormal movements.  Tone is grossly normal in the limbs.  There is no appendicular ataxia on finger-to-nose testing and rapid alternating movements are normal in the extremities.  There is no pronator drift in the arms.   GAIT:  The patient is able to ambulate on a flat, level surface with no gross loss of postural stability. She could walk on heels and toes, she was able to do tandem with my support then was able to it without any support but was difficult    Assessment/plan:    1. Multiple sclerosis  The patient is clinically stable with no evidence of active inflammatory demyelination on current disease modifying therapy with Rebif, and she will continue this medication.     - Repeat MRI brain and cervical spine in a year  - Repeat CBC,  LFT    Vit D level is good. She will continue Vit D.    2. Gait disturbance  Currently she is compensating reasonably well for her gait difficulties. We will continue to keep a close eye on this symptom over time.      3. Urinary urgency  At present she is not desiring any additional treatment; we can readdress this at any point as needed.    4. Visual symptoms  She saw the eye doctor twice since her last visit with us. The doctor thinks the symptoms related to focal eye issues but the patient thinks the symptoms related to MS as they started since the first episode in 2016. Visual symptoms do not affect her function but are irritating. She is still driving but she avoids driving when the symptoms are bothersome. She is referred by the current eye doctor to Dr. Fortune   - follow up with Dr. Fortune as scheduled     Patient is seen and discussed with MD Fabrizio Reyes MD  PGY 4 neurology    Attending physician: I saw and evaluated the patient with the resident and I agree with her findings and plan of care as documented  above.     Viraj Santoyo MD   of Neurology  [facility]    Cc:  Valdez Hurd MD (PCP)  Patient

## 2021-09-23 NOTE — PATIENT INSTRUCTIONS
1. Follow up with Dr. Fortune as scheduled regarding visual symptoms    2. If you would like to pursue a urology evaluation at any point, please let me know    3. Blood tests today    4. MRI scans to be performed prior to the next visit ([facility])    5. Return to clinic in one year

## 2021-09-23 NOTE — Clinical Note
"9/23/2021      RE: Cecile Mays  [address]  [facility] [address]       Referral source: Established patient     Chief complaint: Multiple sclerosis     History of the Present Illness: Ms. Cecile Mays is a 65 year-old woman who returns to the [facility] Clinic today for a scheduled follow-up visit.     Pre the follow up note on 10/17/2020: She initially developed symptoms of demyelinating disease in 2016 when she had an episode of left facial numbness and left eye blurry vision.  MRI and CSF studies were suggestive of demyelinating disease of the type seen in multiple sclerosis.  Subsequently she was formally diagnosed with multiple sclerosis based on radiologic accumulation of lesions on MRI when she was on disease-modifying treatment with glatiramer acetate.  She was then switched to the Rebif formulation of interferon beta in 2017, and remains on that medication.    Interval history:  She saw the Dr. Ho in the eye clinic couple times since her last visit for blurry vision \" eyes fighting each other\". She has to cover the right eye to be able to see with the left eye. She is wearing prism glasses. Her last visit with him was on 8/24. He think that the blurry vision is related to cornea/ocular surface. The patient think these eye symptoms are related to MS as these are the presenting symptoms.  She hs to cover the right eye to see with her left one. She does not feel comfortable when cover the left eye though. The eye problem is steady but exacerbates with hot weather and tachycardia.     She continues to have left cheek numbness, balance issues but she does not fall, walking is slower and her left side is weaker compared to the right side but not worse than before. She continues to have urgency. She stopped the oxybutynin after short period of time because of the dry eye, dry mouth and dry nose. She likes Mebetric well but she could not afford it. No clear episodes of " "neurological deficit.     Memory is getting slightly worse specially when she is tired.     Vit D in March 2021, 65. Triglyceride 172 and . CBC and BMP is fine.     She describes her tolerance of Rebif as \"fine, it's OK\" although does relate that the injections are becoming \"monotonous\" for her.    The last MRI scans of the brain and cervical spine performed on 9/10/2020. There is no abnormal enhancement with gadolinium contrast throughout the brain and cord parenchyma. Multiple periventricular and juxtacortical foci of T2 hyperintensity in the white matter of the cerebral hemispheres bilaterally are unchanged in comparison to previous scan from 8/27/2019. In the cervical spine, T2 hyperintense focus in the right side of the cord at C2 is unchanged in comparison to earlier MRI of 9/24/2018.     Past Medical History:  1.  Depression.   2.  Hyperlipidemia.   3.  Allergic rhinitis.   4.  Gastroesophageal reflux disease    PHYSICAL EXAMINATION:   VITAL SIGNS:  /86 (BP Location: Left arm, Patient Position: Chair, Cuff Size: Adult Regular)   Pulse 74   Ht 1.704 m (5' 7.09\")   Wt 78.2 kg (172 lb 8 oz)   LMP 02/05/2008   BMI 26.95 kg/m      GENERAL:  Well-nourished woman who presents to the examination alone, awake and alert and in no acute distress.  NEUROLOGIC:   MENTAL STATUS:  Alert and oriented times four.   CRANIAL NERVES:  Visual fields are full to confrontation bilaterally.  Extraocular movements are intact with no internuclear ophthalmoplegia.  Facial strength is normal.  Hearing is normal for conversational purposes.  Palate elevation and tongue protrusion are normal.   POWER:  Strength is normal (5/5) in the following muscles/groups bilaterally: Deltoids, biceps, triceps, wrist extensors, finger extensors, first dorsal interosseous, hip flexors hamstrings and anterior tibialis.  REFLEXES:  Reflexes are present and symmetric at biceps, triceps and brachioradialis. Reflexes in the left leg are " slightly  increased as compared to the right, particularly at the knee.  MOTOR/CEREBELLAR:  There are no tremors, myoclonus or other abnormal movements.  Tone is grossly normal in the limbs.  There is no appendicular ataxia on finger-to-nose testing and rapid alternating movements are normal in the extremities.  There is no pronator drift in the arms.   GAIT:  The patient is able to ambulate on a flat, level surface with no gross loss of postural stability. She could walk on heels and toes, she was able to do tandem with my support then was able to it without any support but was difficult    Assessment/plan:    1. Multiple sclerosis  The patient is clinically and radiologically stable with no evidence of active inflammatory demyelination on current disease modifying therapy with Rebif, and she will continue this medication.     - Repeat MRI brain and spines in a year  - Repeat CBC, LFTs, TSH     Vit D level is good. She will continue Vit D.    2. Gait disturbance  Currently she is compensating reasonably well for her gait difficulties. We will continue to keep a close eye on this symptom over time.    3. Urinary urgency  At present she is not desiring any additional treatment; we can readdress this at any point as needed.    4. Visual symptoms  She saw the eye doctor twice since her last visit with us. The doctor think the symptoms related to focal eye issues but the patient think the symptoms related to MS ad they started since the first episode in 2016. Visual symptoms does not affect her functions but are irritating. She is still driving but she avoid driving when the symptoms are bothersome. She is referred by the current eye doctor to Dr. Fortune   - follow up with Dr. Fortune as scheduled       Patient is seen and discussed with MD Fabrizio Reyes MD  PGY 4 neurology      Referral source: Established patient     Chief complaint: Multiple sclerosis     History of the Present Illness: Ms.  "Cecile Mays is a 65 year-old woman who returns to the [facility] today for a scheduled follow-up visit.     Per the follow up note on 10/17/2020: She initially developed symptoms of demyelinating disease in 2016 when she had an episode of left facial numbness and left eye blurry vision.  MRI and CSF studies were suggestive of demyelinating disease of the type seen in multiple sclerosis.  Subsequently she was formally diagnosed with multiple sclerosis based on radiologic accumulation of lesions on MRI when she was on disease-modifying treatment with glatiramer acetate.  She was then switched to the Rebif formulation of interferon beta in 2017, and remains on that medication.    Interval history:  She saw the Dr. Ho in the eye clinic couple times since her last visit for blurry vision \" eyes fighting each other\". She has to cover the right eye to be able to see with the left eye. She is wearing prism glasses. Her last visit with him was on 8/24. He think that the blurry vision is related to cornea/ocular surface. The patient think these eye symptoms are related to MS as these are the presenting symptoms.  She has to cover the right eye to see with her left one. She does not feel comfortable when she covers the left eye though. The eye problem is steady but exacerbates with hot weather and tachycardia.     She continues to have left cheek numbness, balance issues but she does not fall, walking is slower and her left side is weaker compared to the right side but not worse than before. She continues to have urgency. She stopped the oxybutynin after short period of time because of the dry eye, dry mouth and dry nose. She likes mirabefron well but she could not afford it. No clear episodes of neurological deficit.     Memory is getting slightly worse specially when she is tired.     Vit D in March 2021, 65. Triglyceride 172 and . CBC and BMP is fine.     She describes her tolerance of " "Rebif as \"fine, it's OK\" although does relate that the injections are becoming \"monotonous\" for her.    The last MRI scans of the brain and cervical spine performed on 9/10/2020. There is no abnormal enhancement with gadolinium contrast throughout the brain and cord parenchyma. Multiple periventricular and juxtacortical foci of T2 hyperintensity in the white matter of the cerebral hemispheres bilaterally are unchanged in comparison to previous scan from 8/27/2019. In the cervical spine, T2 hyperintense focus in the right side of the cord at C2 is unchanged in comparison to earlier MRI of 9/24/2018.     Past Medical History:  1.  Depression.   2.  Hyperlipidemia.   3.  Allergic rhinitis.   4.  Gastroesophageal reflux disease    PHYSICAL EXAMINATION:   VITAL SIGNS:  /86 (BP Location: Left arm, Patient Position: Chair, Cuff Size: Adult Regular)   Pulse 74   Ht 1.704 m (5' 7.09\")   Wt 78.2 kg (172 lb 8 oz)   LMP 02/05/2008   BMI 26.95 kg/m      GENERAL:  Well-nourished woman who presents to the examination alone, awake and alert and in no acute distress.  NEUROLOGIC:   MENTAL STATUS:  Alert and oriented times four.   CRANIAL NERVES:  Visual fields are full to confrontation bilaterally.  Extraocular movements are intact with no internuclear ophthalmoplegia.  Facial strength is normal.  Hearing is normal for conversational purposes.  Palate elevation and tongue protrusion are normal.   POWER:  Strength is normal (5/5) in the following muscles/groups bilaterally: Deltoids, biceps, triceps, wrist extensors, finger extensors, first dorsal interosseous, hip flexors hamstrings and anterior tibialis.  REFLEXES:  Reflexes are present and symmetric at biceps, triceps and brachioradialis. Reflexes in the left leg are slightly  increased as compared to the right, particularly at the knee.  MOTOR/CEREBELLAR:  There are no tremors, myoclonus or other abnormal movements.  Tone is grossly normal in the limbs.  There is no " appendicular ataxia on finger-to-nose testing and rapid alternating movements are normal in the extremities.  There is no pronator drift in the arms.   GAIT:  The patient is able to ambulate on a flat, level surface with no gross loss of postural stability. She could walk on heels and toes, she was able to do tandem with my support then was able to it without any support but was difficult    Assessment/plan:    1. Multiple sclerosis  The patient is clinically stable with no evidence of active inflammatory demyelination on current disease modifying therapy with Rebif, and she will continue this medication.     - Repeat MRI brain and cervical spine in a year  - Repeat CBC,  LFT    Vit D level is good. She will continue Vit D.    2. Gait disturbance  Currently she is compensating reasonably well for her gait difficulties. We will continue to keep a close eye on this symptom over time.    3. Urinary urgency  At present she is not desiring any additional treatment; we can readdress this at any point as needed.    4. Visual symptoms  She saw the eye doctor twice since her last visit with us. The doctor thinks the symptoms related to focal eye issues but the patient thinks the symptoms related to MS as they started since the first episode in 2016. Visual symptoms do not affect her function but are irritating. She is still driving but she avoids driving when the symptoms are bothersome. She is referred by the current eye doctor to Dr. Fortune   - follow up with Dr. Fortune as scheduled       Patient is seen and discussed with MD Fabrizio Reyes MD  PGY 4 neurology        Viraj Santoyo MD

## 2021-10-23 ENCOUNTER — MYC MEDICAL ADVICE (OUTPATIENT)
Dept: NEUROLOGY | Facility: CLINIC | Age: 66
End: 2021-10-23

## 2021-10-23 ENCOUNTER — HEALTH MAINTENANCE LETTER (OUTPATIENT)
Age: 66
End: 2021-10-23

## 2021-10-28 ENCOUNTER — OFFICE VISIT (OUTPATIENT)
Dept: OPHTHALMOLOGY | Facility: CLINIC | Age: 66
End: 2021-10-28
Attending: OPHTHALMOLOGY
Payer: MEDICARE

## 2021-10-28 DIAGNOSIS — H53.40 VISUAL FIELD DEFECT: ICD-10-CM

## 2021-10-28 DIAGNOSIS — H53.10 SUBJECTIVE VISUAL DISTURBANCE: Primary | ICD-10-CM

## 2021-10-28 PROCEDURE — 92014 COMPRE OPH EXAM EST PT 1/>: CPT | Mod: GC | Performed by: OPHTHALMOLOGY

## 2021-10-28 PROCEDURE — 92083 EXTENDED VISUAL FIELD XM: CPT | Performed by: OPHTHALMOLOGY

## 2021-10-28 PROCEDURE — G0463 HOSPITAL OUTPT CLINIC VISIT: HCPCS

## 2021-10-28 PROCEDURE — 92133 CPTRZD OPH DX IMG PST SGM ON: CPT | Performed by: OPHTHALMOLOGY

## 2021-10-28 ASSESSMENT — TONOMETRY
IOP_METHOD: TONOPEN
OD_IOP_MMHG: 14
OS_IOP_MMHG: 13

## 2021-10-28 ASSESSMENT — REFRACTION_WEARINGRX
OD_CYLINDER: +0.25
OD_SPHERE: +0.25
OD_ADD: +3.00
OD_AXIS: 120
OS_CYLINDER: +0.50
OS_SPHERE: -0.25
OS_ADD: +3.00
OS_AXIS: 090

## 2021-10-28 ASSESSMENT — CONF VISUAL FIELD
OD_NORMAL: 1
METHOD: COUNTING FINGERS
OS_NORMAL: 1

## 2021-10-28 ASSESSMENT — VISUAL ACUITY
CORRECTION_TYPE: GLASSES
METHOD: SNELLEN - LINEAR
OS_CC: 20/25
OD_CC: 20/30

## 2021-10-28 ASSESSMENT — EXTERNAL EXAM - RIGHT EYE: OD_EXAM: NORMAL

## 2021-10-28 ASSESSMENT — SLIT LAMP EXAM - LIDS: COMMENTS: MGD

## 2021-10-28 ASSESSMENT — CUP TO DISC RATIO
OD_RATIO: 0.2
OS_RATIO: 0.2

## 2021-10-28 ASSESSMENT — EXTERNAL EXAM - LEFT EYE: OS_EXAM: NORMAL

## 2021-10-28 NOTE — NURSING NOTE
Chief Complaints and History of Present Illnesses   Patient presents with     Consult For     Chief Complaint(s) and History of Present Illness(es)     Consult For     Laterality: both eyes    Onset: months ago    Quality: States that the eyes are not working together D&N    Associated symptoms: dryness (has plugs), headache and photophobia (has increased).  Negative for double vision    Treatments tried: artificial tears    Pain scale: 0/10              Comments     Harder time when cloudy or bright sun the va is not as good  States if she closes one eye or the other the va is better  AT   Hope ana maria COT 1:24 PM October 28, 2021

## 2021-10-28 NOTE — PROGRESS NOTES
1. Subjective visual disturbance since 2016 in the context of established multiple sclerosis diagnosis.  Neuro-ophthalmologic exam shows normal motility and alignment. There are no efferent ocular motor abnormalities that correlate to patient's symptoms.  I believe her blurred vision in the right eye is what disrupts her binocular function.    2. Mildly suboptimal best corrected visual acuity in the right eye (20/30) of unclear etiology.  No improvement with dry eye treatment.  No history to indicate prior demyelinating optic neuritis.  Patient has symmetric mild retinal nerve fiber layer thinning in both eyes as is often seen in patients with multiple sclerosis in the absence of demyelinating optic neuritis events.  The right retinal nerve fiber layer is not thinner than the left (75 microns in the right eye average versus 72 microns in the left eye).   No right epiretinal membrane.   Patient reports right eye blur resolves with pin hole suggesting this is not optic nerve or retina related.    Patient trialed contact lens in the right eye without improvement.    Initial HPI  Cecile Mays is a pleasant 63 year old White female with a history of multiple sclerosis (diagnosed in 2016) and s/p LASIK who presents to my neuro-ophthalmology clinic today on referral from Dr. Mireles for problems focusing with binocular vision.    Her most recent and only multiple sclerosis demyelinating event was 2016 causing lower lip numbness.   She has never had demyelinating optic neuritis.  Her subjective visual disturbance began around 2016 around the same time as her multiple sclerosis was diagnosed.     Most recent MRI brain 09/2020:  IMPRESSION:  Scan is consistent with the patient's history of multiple  sclerosis. Lesion load is unchanged since 8/27/2019. No enhancing  lesions are identified. No evidence for active demyelination.     Most recent active lesion enhancement on Brain MRI was 05/2017.  On Rebif  "currently.     Interval History last seen by me 3/28/2019    Saw Dr. Ho 8/16/2021   \"- recommend scleral trial - if no improvement in vision, very unlikely vision loss and abnormal fields are related to dry eyes  - discussed repeat visual fields and evaluation by Dr. Fortune  - if vision improved with scleral lenses - consider long term BCL vs scleral lens wear vs. Serum tears.\"    Ms. Mays feels that her eyes are not working well together. She sometimes wears a patch over her right eye when she drives long distances as this relieves the feeling of her eyes not working together. She feels that it is slowly getting worse. She is not bothered by this feeling when she is reading. She does read for shorter durations than she used to as she feels her eyes get stressed and tired.     EXAM:    Visual acuity with correction was 20/30 in the right eye and 20/25 in the left eye. IOP was 14 in the right eye and 13 in the left eye. Confrontation visual fields were full in both eyes. Ocular motility was full in all gaze directions. Color plates were 10/11 in the right eye and 10/11 in the left eye.  Pupils were equal, round and reactive to light with no RAPD.  Slit lamp exam unremarkable aside from mild early tear film break up time and Superficial punctate epithelial erosions.  Mild nuclear sclerotic cataracts in both eyes. Fundus exam showed normal optic nerve head health bilaterally.    Tests ordered and interpreted today:  OCT rNFL demonstrated a mean NFL thickness of 75 in the right eye and 72 in the left eye. Thickness profile demonstrated thinning in the inferior quadrant(s) in the right eye and thinning in the inferior quadrant(s) in the left eye.Both eyes are stable and unchanged compared to 2 years ago.    Octopus automated 30 degree visual field: Glaucoma TOP visual field was performed. Test was reliable.. Right eye mostly full with mild central depression better than 2019 visual field. Left eye with few " non-specific defects superior and inferiorly.      Complete documentation of historical and exam elements from today's encounter can be found in the full encounter summary report (not reduplicated in this progress note).  I personally obtained the chief complaint(s) and history of present illness.  I confirmed and edited as necessary the review of systems, past medical/surgical history, family history, social history, and examination findings as documented by others; and I examined the patient myself.  I personally reviewed the relevant tests, images, and reports as documented above.  I formulated and edited as necessary the assessment and plan and discussed the findings and management plan with the patient and family.  I personally reviewed the ophthalmic test(s) associated with this encounter, agree with the interpretation(s) as documented by the resident/fellow, and have edited the corresponding report(s) as necessary.     MD Jyoti Vargas MD  Ophthalmology Resident PGY3  [facility]

## 2021-12-16 DIAGNOSIS — F33.0 MAJOR DEPRESSIVE DISORDER, RECURRENT EPISODE, MILD (H): ICD-10-CM

## 2021-12-17 RX ORDER — SERTRALINE HYDROCHLORIDE 100 MG/1
TABLET, FILM COATED ORAL
Qty: 90 TABLET | Refills: 1 | Status: SHIPPED | OUTPATIENT
Start: 2021-12-17 | End: 2022-01-26

## 2021-12-17 NOTE — TELEPHONE ENCOUNTER
Routing refill request to provider for review/approval because:  6 month depression follow up needed.     Nida Tyalor Rn

## 2021-12-20 ENCOUNTER — MYC MEDICAL ADVICE (OUTPATIENT)
Dept: FAMILY MEDICINE | Facility: CLINIC | Age: 66
End: 2021-12-20
Payer: MEDICARE

## 2021-12-20 DIAGNOSIS — R39.15 URGENCY OF URINATION: Primary | ICD-10-CM

## 2021-12-20 NOTE — TELEPHONE ENCOUNTER
Spoke with patient about medication questions, when she reported symptoms of UTI.  Reports UTI is not uncommon for her with her MS. She reports urgency, frequency, and strong odor.  She reports worsening symptoms over the past 2-3 weeks.     Requesting a to leave a UA/UC in the lab. Lab order pended if appropriate, please review and sign    Nida Taylor RN

## 2021-12-22 ENCOUNTER — LAB (OUTPATIENT)
Dept: LAB | Facility: CLINIC | Age: 66
End: 2021-12-22
Payer: MEDICARE

## 2021-12-22 DIAGNOSIS — R39.15 URGENCY OF URINATION: ICD-10-CM

## 2021-12-22 LAB
ALBUMIN UR-MCNC: NEGATIVE MG/DL
APPEARANCE UR: ABNORMAL
BACTERIA #/AREA URNS HPF: ABNORMAL /HPF
BILIRUB UR QL STRIP: NEGATIVE
COLOR UR AUTO: YELLOW
GLUCOSE UR STRIP-MCNC: NEGATIVE MG/DL
HGB UR QL STRIP: NEGATIVE
KETONES UR STRIP-MCNC: NEGATIVE MG/DL
LEUKOCYTE ESTERASE UR QL STRIP: ABNORMAL
MUCOUS THREADS #/AREA URNS LPF: PRESENT /LPF
NITRATE UR QL: NEGATIVE
PH UR STRIP: 6 [PH] (ref 5–7)
RBC URINE: 0 /HPF
SP GR UR STRIP: 1.01 (ref 1–1.03)
UROBILINOGEN UR STRIP-MCNC: NORMAL MG/DL
WBC URINE: >182 /HPF

## 2021-12-22 PROCEDURE — 87086 URINE CULTURE/COLONY COUNT: CPT

## 2021-12-22 PROCEDURE — 81001 URINALYSIS AUTO W/SCOPE: CPT

## 2021-12-22 PROCEDURE — 87186 SC STD MICRODIL/AGAR DIL: CPT

## 2021-12-22 RX ORDER — AMOXICILLIN 500 MG/1
500 CAPSULE ORAL 2 TIMES DAILY
Qty: 14 CAPSULE | Refills: 0 | Status: SHIPPED | OUTPATIENT
Start: 2021-12-22 | End: 2021-12-29

## 2021-12-23 LAB — BACTERIA UR CULT: ABNORMAL

## 2022-01-26 ENCOUNTER — OFFICE VISIT (OUTPATIENT)
Dept: FAMILY MEDICINE | Facility: CLINIC | Age: 67
End: 2022-01-26
Payer: MEDICARE

## 2022-01-26 VITALS
DIASTOLIC BLOOD PRESSURE: 72 MMHG | TEMPERATURE: 98.2 F | BODY MASS INDEX: 27.49 KG/M2 | OXYGEN SATURATION: 98 % | HEART RATE: 82 BPM | SYSTOLIC BLOOD PRESSURE: 134 MMHG | WEIGHT: 176 LBS | RESPIRATION RATE: 18 BRPM

## 2022-01-26 DIAGNOSIS — F33.0 MAJOR DEPRESSIVE DISORDER, RECURRENT EPISODE, MILD (H): ICD-10-CM

## 2022-01-26 DIAGNOSIS — R35.0 URINE FREQUENCY: Primary | ICD-10-CM

## 2022-01-26 LAB
ALBUMIN UR-MCNC: NEGATIVE MG/DL
AMORPH CRY #/AREA URNS HPF: ABNORMAL /HPF
APPEARANCE UR: ABNORMAL
BILIRUB UR QL STRIP: NEGATIVE
COLOR UR AUTO: YELLOW
GLUCOSE UR STRIP-MCNC: NEGATIVE MG/DL
HGB UR QL STRIP: NEGATIVE
KETONES UR STRIP-MCNC: NEGATIVE MG/DL
LEUKOCYTE ESTERASE UR QL STRIP: ABNORMAL
NITRATE UR QL: NEGATIVE
PH UR STRIP: 7 [PH] (ref 5–7)
RBC URINE: 0 /HPF
SP GR UR STRIP: 1.01 (ref 1–1.03)
UROBILINOGEN UR STRIP-MCNC: NORMAL MG/DL
WBC URINE: 15 /HPF

## 2022-01-26 PROCEDURE — 87088 URINE BACTERIA CULTURE: CPT | Performed by: FAMILY MEDICINE

## 2022-01-26 PROCEDURE — 99214 OFFICE O/P EST MOD 30 MIN: CPT | Performed by: FAMILY MEDICINE

## 2022-01-26 PROCEDURE — 81001 URINALYSIS AUTO W/SCOPE: CPT | Performed by: FAMILY MEDICINE

## 2022-01-26 PROCEDURE — 87086 URINE CULTURE/COLONY COUNT: CPT | Performed by: FAMILY MEDICINE

## 2022-01-26 RX ORDER — SERTRALINE HYDROCHLORIDE 100 MG/1
150 TABLET, FILM COATED ORAL DAILY
Qty: 90 TABLET | Refills: 1 | Status: SHIPPED | OUTPATIENT
Start: 2022-01-26 | End: 2022-06-15

## 2022-01-26 ASSESSMENT — ANXIETY QUESTIONNAIRES
GAD7 TOTAL SCORE: 3
3. WORRYING TOO MUCH ABOUT DIFFERENT THINGS: NOT AT ALL
2. NOT BEING ABLE TO STOP OR CONTROL WORRYING: NOT AT ALL
6. BECOMING EASILY ANNOYED OR IRRITABLE: SEVERAL DAYS
1. FEELING NERVOUS, ANXIOUS, OR ON EDGE: SEVERAL DAYS
5. BEING SO RESTLESS THAT IT IS HARD TO SIT STILL: NOT AT ALL
7. FEELING AFRAID AS IF SOMETHING AWFUL MIGHT HAPPEN: NOT AT ALL
IF YOU CHECKED OFF ANY PROBLEMS ON THIS QUESTIONNAIRE, HOW DIFFICULT HAVE THESE PROBLEMS MADE IT FOR YOU TO DO YOUR WORK, TAKE CARE OF THINGS AT HOME, OR GET ALONG WITH OTHER PEOPLE: NOT DIFFICULT AT ALL

## 2022-01-26 ASSESSMENT — PAIN SCALES - GENERAL: PAINLEVEL: NO PAIN (0)

## 2022-01-26 ASSESSMENT — PATIENT HEALTH QUESTIONNAIRE - PHQ9
SUM OF ALL RESPONSES TO PHQ QUESTIONS 1-9: 8
5. POOR APPETITE OR OVEREATING: SEVERAL DAYS

## 2022-01-26 NOTE — PROGRESS NOTES
"  Assessment & Plan       ICD-10-CM    1. Urine frequency  R35.0 Physical Therapy Referral     UA Macro with Reflex to Micro and Culture - lab collect     UA Macro with Reflex to Micro and Culture - lab collect     Urine Culture   2. Major depressive disorder, recurrent episode, mild (H)  F33.0 sertraline (ZOLOFT) 100 MG tablet       Heightened anxiety over the past few months.  Considering getting back into counseling.  This been a longstanding problem that waxes and wanes.  Also noting difficulty sleeping related to anxiety.  Plan to increase Zoloft 250 mg and she will consider counseling.  If not better we may need a trial of a new med, consider Celexa or venlafaxine    Worsening urinary urgency, frequency, incontinence.  Start with physical therapy, declined medication at this time  Return to clinic 1 month       BMI:   Estimated body mass index is 27.49 kg/m  as calculated from the following:    Height as of 9/23/21: 1.704 m (5' 7.09\").    Weight as of this encounter: 79.8 kg (176 lb).           No follow-ups on file.    Valdez Hurd MD  [facility]    Earl Turner is a 66 year old who presents for the following health issues     HPI     Depression and Anxiety Follow-Up    How are you doing with your depression since your last visit? Worse, seasonal     How are you doing with your anxiety since your last visit?  No change    Are you having other symptoms that might be associated with depression or anxiety? Yes:  lijMS, covid situation     Have you had a significant life event? Health Concerns     Do you have any concerns with your use of alcohol or other drugs? No    Social History     Tobacco Use     Smoking status: Never Smoker     Smokeless tobacco: Never Used   Substance Use Topics     Alcohol use: No     Alcohol/week: 0.0 standard drinks     Drug use: No     PHQ 3/11/2021 9/23/2021 1/26/2022   PHQ-9 Total Score 2 0 8   Q9: Thoughts of better off dead/self-harm past 2 " weeks Not at all Not at all Not at all     LEATHA-7 SCORE 7/1/2020 3/11/2021 1/26/2022   Total Score 0 3 3         Suicide Assessment Five-step Evaluation and Treatment (SAFE-T)      How many servings of fruits and vegetables do you eat daily?  2-3    On average, how many sweetened beverages do you drink each day (Examples: soda, juice, sweet tea, etc.  Do NOT count diet or artificially sweetened beverages)?   0    How many days per week do you exercise enough to make your heart beat faster? none    How many minutes a day do you exercise enough to make your heart beat faster? none    How many days per week do you miss taking your medication? Never         Review of Systems   Constitutional, HEENT, cardiovascular, pulmonary, gi and gu systems are negative, except as otherwise noted.      Objective    /72   Pulse 82   Temp 98.2  F (36.8  C) (Temporal)   Resp 18   Wt 79.8 kg (176 lb)   LMP 02/05/2008   SpO2 98%   Breastfeeding No   BMI 27.49 kg/m    Body mass index is 27.49 kg/m .  Physical Exam   GENERAL: healthy, alert and no distress  NECK: no adenopathy, no asymmetry, masses, or scars and thyroid normal to palpation  RESP: lungs clear to auscultation - no rales, rhonchi or wheezes  CV: regular rate and rhythm, normal S1 S2, no S3 or S4, no murmur, click or rub, no peripheral edema and peripheral pulses strong  ABDOMEN: soft, nontender, no hepatosplenomegaly, no masses and bowel sounds normal  MS: no gross musculoskeletal defects noted, no edema

## 2022-01-27 ASSESSMENT — ANXIETY QUESTIONNAIRES: GAD7 TOTAL SCORE: 3

## 2022-01-28 LAB
BACTERIA UR CULT: ABNORMAL
BACTERIA UR CULT: ABNORMAL

## 2022-01-28 RX ORDER — NITROFURANTOIN 25; 75 MG/1; MG/1
100 CAPSULE ORAL 2 TIMES DAILY
Qty: 14 CAPSULE | Refills: 0 | Status: SHIPPED | OUTPATIENT
Start: 2022-01-28 | End: 2022-02-04

## 2022-02-10 ENCOUNTER — HOSPITAL ENCOUNTER (OUTPATIENT)
Dept: PHYSICAL THERAPY | Facility: CLINIC | Age: 67
Setting detail: THERAPIES SERIES
End: 2022-02-10
Attending: FAMILY MEDICINE
Payer: MEDICARE

## 2022-02-10 DIAGNOSIS — R35.0 URINE FREQUENCY: ICD-10-CM

## 2022-02-10 PROCEDURE — 97161 PT EVAL LOW COMPLEX 20 MIN: CPT | Mod: GP | Performed by: PHYSICAL THERAPIST

## 2022-02-10 PROCEDURE — 97535 SELF CARE MNGMENT TRAINING: CPT | Mod: GP | Performed by: PHYSICAL THERAPIST

## 2022-02-10 PROCEDURE — 97110 THERAPEUTIC EXERCISES: CPT | Mod: GP | Performed by: PHYSICAL THERAPIST

## 2022-02-11 NOTE — PROGRESS NOTES
02/10/22 0751   General Information   Type of Visit Initial OP Ortho PT Evaluation   Start of Care Date 02/10/22   Referring Physician Valdez Hurd MD   Patient/Family Goals Statement improve bladder control   Orders Evaluate and Treat   Date of Order 01/26/22   Certification Required? Yes   Medical Diagnosis urinary frequency   Surgical/Medical history reviewed Yes   Precautions/Limitations no known precautions/limitations   Body Part(s)   Body Part(s) Pelvic Floor Dysfunction   Presentation and Etiology   Pertinent history of current problem (include personal factors and/or comorbidities that impact the POC) Pt reports insideous onset of symptoms over the last year. Pt has gone through menopause.  Pt has not had children, had tubal in 40-50 years ago.  Pt reports mixed incontinence with most urgency; reports a couple times/wk full release of bladder, is unable to slow stream.  Pt reports only rare stress incontinence.  Pt reprots doing kegels without improvement in symptoms.  Has relapsing remitting MS- last relapse in 2016.    Impairments P. Bowel or bladder problems   Functional Limitations perform activities of daily living   How/Where did it occur From insidious onset   Onset date of current episode/exacerbation 02/10/21   Chronicity Chronic   Current Level of Function   Patient role/employment history F. Retired  ([employer]phalt [employer]- office)   Living environment House/townhome  ()   Fall Risk Screen   Fall screen completed by PT   Have you fallen 2 or more times in the past year? No   Have you fallen and had an injury in the past year? No   Is patient a fall risk? No   Abuse Screen (yes response referral indicated)   Feels Unsafe at Home or Work/School no   Feels Threatened by Someone no   Does Anyone Try to Keep You From Having Contact with Others or Doing Things Outside Your Home? no   Physical Signs of Abuse Present no   Specific Questions   Specific Questions Pelvic Floor Dysfunction   Pelvic Floor  "Dysfunction Questions   Regular exercise No   Fluid intake-glasses/day (one glass/cup = 8oz 20oz   Caffeinated beverages-glasses/day 0-1   Alcoholic beverages - glasses/day 0   Recent diet change? No   How long can you delay the need to urinate?  1-2   How many times do you wake to urinate at night?   2-3   How often do you urinate during the day?   4-6   Can you stop the flow of urine when on the toilet?  No   Is the volume of urine passed usually  Small   Do you have the sensation that you need to go to the toilet?  Yes   Do you empty your bladder frequently, before you experience the urge to pass urine?  Yes   Do you have \"triggers\" that make you feel you can't wait to go to the toilet?  Yes   Number of bladder infections last year?  1 this past year, started taking cranberry; 4 last year   Frequency of bowel movements:  0-1   Consistency of stool?  Soft formed   Do you ignore the urge to defecate?  Yes   Pelvic Floor Dysfunction Objective Findings   Type of Storage Problem urgency;frequency;stress incontinence;urge incontinence   Type of Emptying Problem incomplete emptying;poor stream   Protection needed Pad;Number of pads per day   Power (MMT at Levator Ani) 2   Endurance (Up to 10 seconds as long as still 50% power) 2   Repetitions (Contract 10 seconds or MVC, rest 4 seconds and count max number of reps) 6   Fast Twitch (Number of 1 second contractions can do in 10 seconds. Norm=7 reps) 5   Elevation (Able to lift posterior vaginal wall toward head and pubic bone) Absent   Medications Anti-depressants   Pad pantiliner- heavier pad if traveling or out of house   Number of pads 3   Anti-depressants sirtlene   Planned Therapy Interventions   Planned Therapy Interventions ADL retraining;IADL retraining;joint mobilization;manual therapy;neuromuscular re-education;ROM;strengthening;stretching   Planned Modality Interventions   Planned Modality Interventions Biofeedback   Clinical Impression   Criteria for Skilled " Therapeutic Interventions Met yes, treatment indicated   PT Diagnosis urinary urgency, urge incontinence, stress incontinence   Influenced by the following impairments weakness, decreased neuromuscular control   Functional limitations due to impairments bladder control with all ADL and activities   Clinical Presentation Stable/Uncomplicated   Clinical Presentation Rationale clinical judgement   Clinical Decision Making (Complexity) Low complexity   Therapy Frequency 1 time/week   Predicted Duration of Therapy Intervention (days/wks) 90 days   Risk & Benefits of therapy have been explained Yes   Patient, Family & other staff in agreement with plan of care Yes   Pelvic Health Informed Consent Statement Discussed with patient/guardian reason for referral regarding pelvic health needs and external/internal pelvic floor muscle examination.  Opportunity provided to ask questions and verbal consent for assessment and intervention was given.   Clinical Impression Comments The patient is a 65 yo female who was referred to outpatient physical therapy for evaluation and treatment of urinary frequency, patient reports more symptoms of urge and stress incontinence.  Patient demonstrates decreased strength and neuromuscular control of bladder with daily activity.  Skilled physical therapy is required in order to improve bladder control with daily activities.    Education Assessment   Preferred Learning Style Demonstration;Listening   Barriers to Learning No barriers   ORTHO GOALS   PT Ortho Eval Goals 1;2;3   Ortho Goal 1   Goal Identifier 1   Goal Description The patient will complete bladder diary in order to assess appropriate hydration and frequency.   Target Date 05/11/22   Ortho Goal 2   Goal Identifier 2   Goal Description The patient will deny >1 liner per day in order to improve bladder control with daily activities.   Target Date 05/11/22   Ortho Goal 3   Goal Identifier 3   Goal Description The patient will be able to  complete functional knack in order to demonstrate functional pelvic strength.    Target Date 05/11/22   Total Evaluation Time   PT Eval, Low Complexity Minutes (03308) 20   Therapy Certification   Certification date from 02/10/22   Certification date to 05/11/22   Medical Diagnosis urinary frequency

## 2022-02-11 NOTE — PROGRESS NOTES
[facility]    OUTPATIENT PHYSICAL THERAPY ORTHOPEDIC EVALUATION  PLAN OF TREATMENT FOR OUTPATIENT REHABILITATION  (COMPLETE FOR INITIAL CLAIMS ONLY)  Patient's Last Name, First Name, M.I.  YOB: 1955  TabathaCecile  MARITA    Provider s Name:  [facility]   Medical Record No.  [id number]   Start of Care Date:  02/10/22   Onset Date:  02/10/21   Type:     _X__PT   ___OT   ___SLP Medical Diagnosis:  urinary frequency     PT Diagnosis:  urinary urgency, urge incontinence, stress incontinence   Visits from SOC:  1      _________________________________________________________________________________  Plan of Treatment/Functional Goals:  ADL retraining,IADL retraining,joint mobilization,manual therapy,neuromuscular re-education,ROM,strengthening,stretching     Biofeedback     Goals  Goal Identifier: 1  Goal Description: The patient will complete bladder diary in order to assess appropriate hydration and frequency.  Target Date: 05/11/22    Goal Identifier: 2  Goal Description: The patient will deny >1 liner per day in order to improve bladder control with daily activities.  Target Date: 05/11/22    Goal Identifier: 3  Goal Description: The patient will be able to complete functional knack in order to demonstrate functional pelvic strength.   Target Date: 05/11/22                                                           Therapy Frequency:  1 time/week  Predicted Duration of Therapy Intervention:  90 days    Ivania Lao, PT                 I CERTIFY THE NEED FOR THESE SERVICES FURNISHED UNDER        THIS PLAN OF TREATMENT AND WHILE UNDER MY CARE     (Physician co-signature of this document indicates review and certification of the therapy plan).                       Certification Date From:  02/10/22   Certification Date To:  05/11/22    Referring Provider:   Valdez Hurd MD    Initial Assessment        See Epic Evaluation Start of Care Date: 02/10/22

## 2022-02-17 ENCOUNTER — HOSPITAL ENCOUNTER (OUTPATIENT)
Dept: PHYSICAL THERAPY | Facility: CLINIC | Age: 67
Setting detail: THERAPIES SERIES
End: 2022-02-17
Attending: FAMILY MEDICINE
Payer: MEDICARE

## 2022-02-17 PROCEDURE — 97112 NEUROMUSCULAR REEDUCATION: CPT | Mod: GP | Performed by: PHYSICAL THERAPIST

## 2022-02-17 PROCEDURE — 97110 THERAPEUTIC EXERCISES: CPT | Mod: GP | Performed by: PHYSICAL THERAPIST

## 2022-02-17 PROCEDURE — 97535 SELF CARE MNGMENT TRAINING: CPT | Mod: GP | Performed by: PHYSICAL THERAPIST

## 2022-03-03 ENCOUNTER — HOSPITAL ENCOUNTER (OUTPATIENT)
Dept: PHYSICAL THERAPY | Facility: CLINIC | Age: 67
Setting detail: THERAPIES SERIES
End: 2022-03-03
Attending: FAMILY MEDICINE
Payer: MEDICARE

## 2022-03-03 PROCEDURE — 97535 SELF CARE MNGMENT TRAINING: CPT | Mod: GP | Performed by: PHYSICAL THERAPIST

## 2022-03-03 PROCEDURE — 90913 BFB TRAINING EA ADDL 15 MIN: CPT | Mod: GP | Performed by: PHYSICAL THERAPIST

## 2022-03-03 PROCEDURE — 90912 BFB TRAINING 1ST 15 MIN: CPT | Mod: GP | Performed by: PHYSICAL THERAPIST

## 2022-03-09 ENCOUNTER — OFFICE VISIT (OUTPATIENT)
Dept: FAMILY MEDICINE | Facility: CLINIC | Age: 67
End: 2022-03-09
Payer: MEDICARE

## 2022-03-09 VITALS
OXYGEN SATURATION: 95 % | RESPIRATION RATE: 14 BRPM | SYSTOLIC BLOOD PRESSURE: 136 MMHG | TEMPERATURE: 97.7 F | BODY MASS INDEX: 27.03 KG/M2 | HEART RATE: 86 BPM | WEIGHT: 173 LBS | DIASTOLIC BLOOD PRESSURE: 92 MMHG

## 2022-03-09 DIAGNOSIS — F43.22 ADJUSTMENT DISORDER WITH ANXIOUS MOOD: Primary | ICD-10-CM

## 2022-03-09 PROCEDURE — 99213 OFFICE O/P EST LOW 20 MIN: CPT | Performed by: FAMILY MEDICINE

## 2022-03-09 ASSESSMENT — ANXIETY QUESTIONNAIRES
3. WORRYING TOO MUCH ABOUT DIFFERENT THINGS: NOT AT ALL
2. NOT BEING ABLE TO STOP OR CONTROL WORRYING: NOT AT ALL
6. BECOMING EASILY ANNOYED OR IRRITABLE: NOT AT ALL
5. BEING SO RESTLESS THAT IT IS HARD TO SIT STILL: NOT AT ALL
1. FEELING NERVOUS, ANXIOUS, OR ON EDGE: NOT AT ALL
GAD7 TOTAL SCORE: 0
4. TROUBLE RELAXING: NOT AT ALL
GAD7 TOTAL SCORE: 0
7. FEELING AFRAID AS IF SOMETHING AWFUL MIGHT HAPPEN: NOT AT ALL
GAD7 TOTAL SCORE: 0
7. FEELING AFRAID AS IF SOMETHING AWFUL MIGHT HAPPEN: NOT AT ALL

## 2022-03-09 ASSESSMENT — PATIENT HEALTH QUESTIONNAIRE - PHQ9
SUM OF ALL RESPONSES TO PHQ QUESTIONS 1-9: 0
10. IF YOU CHECKED OFF ANY PROBLEMS, HOW DIFFICULT HAVE THESE PROBLEMS MADE IT FOR YOU TO DO YOUR WORK, TAKE CARE OF THINGS AT HOME, OR GET ALONG WITH OTHER PEOPLE: NOT DIFFICULT AT ALL
SUM OF ALL RESPONSES TO PHQ QUESTIONS 1-9: 0

## 2022-03-09 ASSESSMENT — PAIN SCALES - GENERAL: PAINLEVEL: NO PAIN (0)

## 2022-03-09 NOTE — PROGRESS NOTES
"  Assessment & Plan       ICD-10-CM    1. Adjustment disorder with anxious mood  F43.22          Follow-up today for her mood disorder.  We had increased her sertraline at her last visit 250 mg, she did not feel like that helped much.,  She is return back down to 100 mg daily.  She notes some relief from her depression, but not her anxiety.  Has a long history of anxiety issues, debate dating back to her childhood.  Spent some time talking about how they can continue to impact this.  She has a long history of counseling, does not feel like she needs at this time.  She prefers just to continue with things as is for now and she will call if mood worsens.  Neck that is agreeable I can see her back for physical.  Also having trouble at time finding acceptance with her MS limitations         BMI:   Estimated body mass index is 27.03 kg/m  as calculated from the following:    Height as of 9/23/21: 1.704 m (5' 7.09\").    Weight as of this encounter: 78.5 kg (173 lb).           No follow-ups on file.    Valdez Hurd MD  [facility]    Earl Turner is a 66 year old who presents for the following health issues     History of Present Illness       Mental Health Follow-up:  Patient presents to follow-up on Depression & Anxiety.Patient's depression since last visit has been:  No change  The patient is not having other symptoms associated with depression.  Patient's anxiety since last visit has been:  No change  The patient is not having other symptoms associated with anxiety.  Any significant life events: No  Patient is not feeling anxious or having panic attacks.  Patient has no concerns about alcohol or drug use.       Today's PHQ-9         PHQ-9 Total Score: 0  PHQ-9 Q9 Thoughts of better off dead/self-harm past 2 weeks :   (P) Not at all    How difficult have these problems made it for you to do your work, take care of things at home, or get along with other people: Not difficult at " all    Today's LEATHA-7 Score: 0    She eats 4 or more servings of fruits and vegetables daily.She consumes 0 sweetened beverage(s) daily.She exercises with enough effort to increase her heart rate 9 or less minutes per day.  She exercises with enough effort to increase her heart rate 3 or less days per week.   She is taking medications regularly.             Review of Systems         Objective    BP (!) 136/92   Pulse 86   Temp 97.7  F (36.5  C) (Temporal)   Resp 14   Wt 78.5 kg (173 lb)   LMP 02/05/2008   SpO2 95%   BMI 27.03 kg/m    Body mass index is 27.03 kg/m .  Physical Exam

## 2022-03-10 ENCOUNTER — HOSPITAL ENCOUNTER (OUTPATIENT)
Dept: PHYSICAL THERAPY | Facility: CLINIC | Age: 67
Setting detail: THERAPIES SERIES
Discharge: HOME | End: 2022-03-10
Attending: FAMILY MEDICINE
Payer: MEDICARE

## 2022-03-10 PROCEDURE — 97535 SELF CARE MNGMENT TRAINING: CPT | Mod: GP | Performed by: PHYSICAL THERAPIST

## 2022-03-10 PROCEDURE — 90913 BFB TRAINING EA ADDL 15 MIN: CPT | Mod: GP | Performed by: PHYSICAL THERAPIST

## 2022-03-10 PROCEDURE — 90912 BFB TRAINING 1ST 15 MIN: CPT | Mod: GP | Performed by: PHYSICAL THERAPIST

## 2022-03-10 ASSESSMENT — PATIENT HEALTH QUESTIONNAIRE - PHQ9: SUM OF ALL RESPONSES TO PHQ QUESTIONS 1-9: 0

## 2022-03-10 ASSESSMENT — ANXIETY QUESTIONNAIRES: GAD7 TOTAL SCORE: 0

## 2022-03-30 DIAGNOSIS — R05.8 ALLERGIC COUGH: ICD-10-CM

## 2022-04-01 RX ORDER — MONTELUKAST SODIUM 10 MG/1
TABLET ORAL
Qty: 30 TABLET | Refills: 5 | Status: SHIPPED | OUTPATIENT
Start: 2022-04-01 | End: 2022-10-10

## 2022-04-19 NOTE — PROGRESS NOTES
"[facility]    Outpatient Physical Therapy Discharge Note  Patient: Cecile Mays  : 1955    Beginning/End Dates of Reporting Period:  2/10/22 to 3/10/22    Referring Provider: Vladez Hurd MD    Therapy Diagnosis: urinary urgency, urge incontinence, stress incontinence     Client Self Report: Pt reports symptoms have been \"okay\".  Pt reports \"its helping\"- reports improvement in urge supression, reports \"key in door\" syndromes.     Goals:  Goal Identifier 1   Goal Description The patient will complete bladder diary in order to assess appropriate hydration and frequency.   Target Date 22   Date Met   3/3/22   Progress (detail required for progress note):  Completed, not complete data, did not reassign     Goal Identifier 2   Goal Description The patient will deny >1 liner per day in order to improve bladder control with daily activities.   Target Date 22   Date Met      Progress (detail required for progress note):  Not met     Goal Identifier 3   Goal Description The patient will be able to complete functional knack in order to demonstrate functional pelvic strength.    Target Date 22   Date Met      Progress (detail required for progress note):  Not met     Plan:  Discharge from therapy.    Discharge:    Reason for Discharge: Patient chooses to discontinue therapy.  Patient was frusterated with inability to improve kinesthetic awareness of pelvic floor musculature      Discharge Plan: Patient to continue home program.      Ivania Lao, PT, DPT, OCS, CSCS  [facility]  [phone]    "

## 2022-04-26 ENCOUNTER — HOSPITAL ENCOUNTER (OUTPATIENT)
Dept: MAMMOGRAPHY | Facility: CLINIC | Age: 67
Discharge: HOME | End: 2022-04-26
Attending: FAMILY MEDICINE | Admitting: FAMILY MEDICINE
Payer: MEDICARE

## 2022-04-26 DIAGNOSIS — Z12.31 VISIT FOR SCREENING MAMMOGRAM: ICD-10-CM

## 2022-04-26 PROCEDURE — 77067 SCR MAMMO BI INCL CAD: CPT

## 2022-06-04 ENCOUNTER — HEALTH MAINTENANCE LETTER (OUTPATIENT)
Age: 67
End: 2022-06-04

## 2022-06-11 DIAGNOSIS — F33.0 MAJOR DEPRESSIVE DISORDER, RECURRENT EPISODE, MILD (H): ICD-10-CM

## 2022-06-15 RX ORDER — SERTRALINE HYDROCHLORIDE 100 MG/1
TABLET, FILM COATED ORAL
Qty: 90 TABLET | Refills: 1 | Status: SHIPPED | OUTPATIENT
Start: 2022-06-15 | End: 2022-09-14

## 2022-07-19 ENCOUNTER — TELEPHONE (OUTPATIENT)
Dept: NEUROLOGY | Facility: CLINIC | Age: 67
End: 2022-07-19

## 2022-07-19 NOTE — TELEPHONE ENCOUNTER
PA Initiation    Medication: Interferon Beta-1a (REBIF REBIDOSE) 44 MCG/0.5ML SOAJ (PA PENDING)  Insurance Company: [employer] Part D - Phone [phone] Fax [phone]  Pharmacy Filling the Rx: [facility] - [facility] [address] [facility]  Filling Pharmacy Phone:    Filling Pharmacy Fax:    Start Date: 7/19/2022        Thank you,    Josefina Wick [facility]  Specialty Pharmacy Clinic Liaison - CardiologyNeurologyMultiple Sclerosis  [facility] [address] [address]  Ph: [phone] Fax: [phone]  [email or web address]

## 2022-07-20 NOTE — TELEPHONE ENCOUNTER
Prior Authorization Approval    Authorization Effective Date: 4/20/2022  Authorization Expiration Date: 7/19/2023  Medication: Interferon Beta-1a (REBIF REBIDOSE) 44 MCG/0.5ML SOAJ ([place] APPROVED)  Approved Dose/Quantity: 28 days  Reference #:     Insurance Company: [employer] Part D - Phone [phone] Fax [phone]  Expected CoPay:       CoPay Card Available: No    Foundation Assistance Needed: [place] [facility]  Which Pharmacy is filling the prescription (Not needed for infusion/clinic administered): [facility] - [address]  Pharmacy Notified:    Patient Notified:        Receives thru free drug.        Thank you,    Josefina Wick [facility]  Specialty Pharmacy Clinic Liaison - CardiologyNeurologyMultiple Sclerosis  [facility] [address] [address]  Ph: [phone] Fax: [phone]  [email or web address]

## 2022-09-12 DIAGNOSIS — F33.0 MAJOR DEPRESSIVE DISORDER, RECURRENT EPISODE, MILD (H): ICD-10-CM

## 2022-09-14 RX ORDER — SERTRALINE HYDROCHLORIDE 100 MG/1
TABLET, FILM COATED ORAL
Qty: 135 TABLET | Refills: 0 | Status: SHIPPED | OUTPATIENT
Start: 2022-09-14 | End: 2022-12-19

## 2022-09-14 NOTE — TELEPHONE ENCOUNTER
We recently received a call from your pharmacy requesting a refill of your medication.      A review of your chart indicates that an appointment is required with your provider.  Please call the clinic at [phone] to schedule your appointment.      We have authorized one refill of your medication to allow time for you to schedule.  If you have a history of diabetes or high cholesterol, please come fasting to the appointment.  Fasting entails nothing to eat or drink 8 hours prior to your appointment; with the exception of water.  You may take your medication the day of the appointment.  Medication is being filled for 1 time anabell refill only due to:  Patient is due for depression    Please call and help schedule.  Thank you!

## 2022-09-23 ENCOUNTER — HOSPITAL ENCOUNTER (OUTPATIENT)
Dept: MRI IMAGING | Facility: CLINIC | Age: 67
Discharge: HOME | End: 2022-09-23
Attending: PSYCHIATRY & NEUROLOGY
Payer: MEDICARE

## 2022-09-23 DIAGNOSIS — G35 MS (MULTIPLE SCLEROSIS) (H): ICD-10-CM

## 2022-09-23 PROCEDURE — 70553 MRI BRAIN STEM W/O & W/DYE: CPT | Mod: MG

## 2022-09-23 PROCEDURE — 255N000002 HC RX 255 OP 636: Performed by: PSYCHIATRY & NEUROLOGY

## 2022-09-23 PROCEDURE — A9585 GADOBUTROL INJECTION: HCPCS | Performed by: PSYCHIATRY & NEUROLOGY

## 2022-09-23 PROCEDURE — 72156 MRI NECK SPINE W/O & W/DYE: CPT | Mod: MG

## 2022-09-23 RX ORDER — GADOBUTROL 604.72 MG/ML
7.5 INJECTION INTRAVENOUS ONCE
Status: COMPLETED | OUTPATIENT
Start: 2022-09-23 | End: 2022-09-23

## 2022-09-23 RX ADMIN — GADOBUTROL 7.5 ML: 604.72 INJECTION INTRAVENOUS at 08:43

## 2022-10-03 ENCOUNTER — OFFICE VISIT (OUTPATIENT)
Dept: NEUROLOGY | Facility: CLINIC | Age: 67
End: 2022-10-03
Payer: MEDICARE

## 2022-10-03 VITALS
SYSTOLIC BLOOD PRESSURE: 132 MMHG | BODY MASS INDEX: 27.32 KG/M2 | WEIGHT: 170 LBS | DIASTOLIC BLOOD PRESSURE: 82 MMHG | HEIGHT: 66 IN | HEART RATE: 72 BPM

## 2022-10-03 DIAGNOSIS — G35 MS (MULTIPLE SCLEROSIS) (H): Primary | ICD-10-CM

## 2022-10-03 DIAGNOSIS — E55.9 VITAMIN D DEFICIENCY: ICD-10-CM

## 2022-10-03 DIAGNOSIS — R39.15 URINARY URGENCY: ICD-10-CM

## 2022-10-03 DIAGNOSIS — R53.82 CHRONIC FATIGUE: ICD-10-CM

## 2022-10-03 LAB
ALBUMIN SERPL-MCNC: 3.6 G/DL (ref 3.4–5)
ALP SERPL-CCNC: 145 U/L (ref 40–150)
ALT SERPL W P-5'-P-CCNC: 27 U/L (ref 0–50)
AST SERPL W P-5'-P-CCNC: 17 U/L (ref 0–45)
BASOPHILS # BLD AUTO: 0 10E3/UL (ref 0–0.2)
BASOPHILS NFR BLD AUTO: 1 %
BILIRUB DIRECT SERPL-MCNC: <0.1 MG/DL (ref 0–0.2)
BILIRUB SERPL-MCNC: 0.4 MG/DL (ref 0.2–1.3)
EOSINOPHIL # BLD AUTO: 0.1 10E3/UL (ref 0–0.7)
EOSINOPHIL NFR BLD AUTO: 2 %
ERYTHROCYTE [DISTWIDTH] IN BLOOD BY AUTOMATED COUNT: 12.7 % (ref 10–15)
HCT VFR BLD AUTO: 42.1 % (ref 35–47)
HGB BLD-MCNC: 13.5 G/DL (ref 11.7–15.7)
IMM GRANULOCYTES # BLD: 0 10E3/UL
IMM GRANULOCYTES NFR BLD: 0 %
LYMPHOCYTES # BLD AUTO: 2.4 10E3/UL (ref 0.8–5.3)
LYMPHOCYTES NFR BLD AUTO: 41 %
MCH RBC QN AUTO: 29.1 PG (ref 26.5–33)
MCHC RBC AUTO-ENTMCNC: 32.1 G/DL (ref 31.5–36.5)
MCV RBC AUTO: 91 FL (ref 78–100)
MONOCYTES # BLD AUTO: 0.4 10E3/UL (ref 0–1.3)
MONOCYTES NFR BLD AUTO: 7 %
NEUTROPHILS # BLD AUTO: 2.8 10E3/UL (ref 1.6–8.3)
NEUTROPHILS NFR BLD AUTO: 49 %
NRBC # BLD AUTO: 0 10E3/UL
NRBC BLD AUTO-RTO: 0 /100
PLATELET # BLD AUTO: 286 10E3/UL (ref 150–450)
PROT SERPL-MCNC: 7 G/DL (ref 6.8–8.8)
RBC # BLD AUTO: 4.64 10E6/UL (ref 3.8–5.2)
WBC # BLD AUTO: 5.8 10E3/UL (ref 4–11)

## 2022-10-03 PROCEDURE — 36415 COLL VENOUS BLD VENIPUNCTURE: CPT | Performed by: PSYCHIATRY & NEUROLOGY

## 2022-10-03 PROCEDURE — 82306 VITAMIN D 25 HYDROXY: CPT | Performed by: PSYCHIATRY & NEUROLOGY

## 2022-10-03 PROCEDURE — 99214 OFFICE O/P EST MOD 30 MIN: CPT | Mod: GC | Performed by: PSYCHIATRY & NEUROLOGY

## 2022-10-03 PROCEDURE — 80076 HEPATIC FUNCTION PANEL: CPT | Performed by: PSYCHIATRY & NEUROLOGY

## 2022-10-03 PROCEDURE — 85025 COMPLETE CBC W/AUTO DIFF WBC: CPT | Performed by: PSYCHIATRY & NEUROLOGY

## 2022-10-03 RX ORDER — MIRABEGRON 25 MG/1
25 TABLET, FILM COATED, EXTENDED RELEASE ORAL DAILY
Qty: 90 TABLET | Refills: 3 | Status: SHIPPED | OUTPATIENT
Start: 2022-10-03 | End: 2023-04-11

## 2022-10-03 ASSESSMENT — PAIN SCALES - GENERAL: PAINLEVEL: NO PAIN (0)

## 2022-10-03 NOTE — PROGRESS NOTES
Multiple Sclerosis Clinic Visit  10/03/2022    Reason: Multiple Sclerosis     Source of information: Patient and chart review    History of Present Symptom:  Cecile Mays is a 66 year old female with a PMH significant for multiple sclerosis who presents today for follow up. She remains overall stable with most prominent symptoms including fatigue, urinary incontinence and mild balance difficulty.        No difficulty tolerating Rebif. She is taking this as prescribed with no side effects.     Disease onset: 2016 left facial numbness and blurry vision in the left eye   Previous disease modifying therapy:   Glatiramer acetate - radiographic progression of disease   2017 Rebif     Taking vitamin D 5000 international unit(s) daily     Symptom management:  Fatigue: Feels significant fatigue, leads to a crash in the afternoon. She will take a nap frequently. Feels it is manageable overall.   Sleep is okay, interrupted by urination at times.   Bowel/bladder changes: Has tried pelvic floor therapy. No change in symptoms.   Uses about 6 pad per day. Myrbetriq helpful in the past but stopped because it became to expensive.   She will have virgil incontinence on the way to the restroom.   Gait/balance: Stable balance difficulty over the past year. No limitations on walking. Very active around her property.     The patient's medical, surgical, social, and family history were personally reviewed with the patient.  Past Medical History:   Diagnosis Date    Allergy     Depressive disorder     High cholesterol       Past Surgical History:   Procedure Laterality Date    COLONOSCOPY  1/24/2014    Procedure: COLONOSCOPY;  Colonoscopy;  Surgeon: Jose Martin Dye MD;  Location: [facility]  1970's     Social History     Tobacco Use    Smoking status: Never Smoker    Smokeless tobacco: Never Used   Substance Use Topics    Alcohol use: No     Alcohol/week: 0.0 standard drinks    Drug use: No  "    Family History   Problem Relation Age of Onset    Cancer Father          at age 70    Lipids Father     Hypertension Father     Diabetes Father         Adult Onset    Heart Disease Mother         low blood pressure, passed away 2004 lung cancer    Cancer Mother         Lung    Diabetes Mother         Adult Onset    Respiratory Sister         uses inhalers    Cancer Sister         Lung    Heart Disease Sister         Hx Rhumatic fever    Glaucoma No family hx of     Macular Degeneration No family hx of      Current Outpatient Medications   Medication    albuterol (PROAIR HFA/PROVENTIL HFA/VENTOLIN HFA) 108 (90 Base) MCG/ACT inhaler    aspirin 81 MG tablet    budesonide-formoterol (SYMBICORT) 160-4.5 MCG/ACT Inhaler    Calcium Carb-Cholecalciferol 600-800 MG-UNIT TABS    Cholecalciferol (VITAMIN D) 2000 UNITS tablet    Cranberry 500 MG CAPS    Interferon Beta-1a (REBIF REBIDOSE) 44 MCG/0.5ML SOAJ    Krill Oil Omega-3 500 MG CAPS    sertraline (ZOLOFT) 100 MG tablet    WOMENS DAILY MULTIVITAMIN OR TABS    montelukast (SINGULAIR) 10 MG tablet     No current facility-administered medications for this visit.     Allergies   Allergen Reactions    No Known Drug Allergy          Review of Systems:  14-point review of systems was completed. The pertinent positives and negatives are in the HPI.    Physical Examination   Vitals: /82   Pulse 72   Ht 1.676 m (5' 6\")   Wt 77.1 kg (170 lb)   LMP 2008   BMI 27.44 kg/m    General: Patient appears comfortable in no acute distress.   HEENT: NC/AT, no icterus, moist mucous membranes  Chest: non-labored on RA  Extremities: Warm, no edema  Skin: No rash or lesion   Psych: Affect appropriate for situation   Neuro:  Mental status: Awake, alert, attentive. Language is fluent with intact comprehension of commands.  Cranial nerves: PERRL with no relative afferent pupillary defect, conjugate gaze, EOMI, visual fields intact, face symmetric, shoulder shrug strong, " tongue protrusion/uvula midline, no dysarthria.   Motor: Normal muscle bulk and tone. No abnormal movements. 5/5 strength in 4/4 extremities.     R L  Deltoid  5 5  Biceps  5 5  Triceps  5 5  Wrist ext 5 5  Finger ext 5 5  Finger abd 5 5    Hip flexion 5 5  Knee flexion 5 5  Knee ext 5 5  Dorsiflexion 5 5    Reflexes: 2+ reflexes symmetric in biceps, brachioradialis, patellae, and achilles. No clonus, toes down-going.  Sensory: Intact to light touch, vibration. Romberg is negative with mild difficulty.   Coordination: FNF without ataxia or dysmetria.    Gait: Normal width, stride length, turn, with symmetric arm swing. Tandem walk impaired. Balance difficulty on one foot bilaterally.     Laboratory:  All laboratory data reviewed    Imaging:    MRI brain and cervical spne 9/23/22  No new or enhancing demyelinating lesions.     Stable T2 lesions in the brain and cervical cord at C2.     Assessment/Plan:  Cecile Mays is a 66 year old female who presents for follow up for multiple sclerosis. She remains clinically and radiologically stable on Rebif. We did discussed options for her symptoms including fatigue and urinary incontinence. We also discussed that she is now 66 years of age and has been stable since 2017. We discussed that the natural course of MS is to reduce in inflammation in the mid 50's or 60s and it would be reasonable to trial off of the medication and do closer MRI monitoring. We discussed the lack of randomized controlled trials in this area and that either option would be reasonable. She would prefer to stay on the medication for now and will revisit this after her next MRI.     # multiple sclerosis   -continue rebif   -blood work today   -return to clinic in one year     # urinary incontinence   -restart myrbetriq as this worked well in the past    # fatigue   -we discussed options such as amantadine and modafinil. She will let us know if she would like to try this if her symptoms become  more bothersome.   -encouraged 90 min weekly aerobic exercise     Patient seen and discussed with Dr. Del Rio.  I have reviewed the plan with the patient, who is in agreement.      Erum Calderon DO  Multiple Sclerosis Fellow     Attending physician: I saw and evaluated the patient with Dr. Calderon and I agree with her findings and plan of care as documented above.    I personally reviewed the patient's recent neuroimaging (MRI scans of the brain and cervical spine). My independent interpretation is that there is no evidence of active inflammation and no new lesions since the previous imaging.     It is her preference to remain on Rebif, which is fine.    I will check routine laboratory studies for monitoring of this medication today to include complete blood counts with differential and hepatic panel. I will also check a vitamin D level and advise the patient on supplementation with vitamin D as needed to maintain her level within the goal range of 60-80 mcg/L.    Remainder as above.    Viraj Santoyo MD   of Neurology  [facility]

## 2022-10-03 NOTE — LETTER
10/3/2022      RE: Cecile Mays  [address]  [facility] [address]     Multiple Sclerosis Clinic Visit  10/03/2022    Reason: Multiple Sclerosis     Source of information: Patient and chart review    History of Present Symptom:  Cecile Mays is a 66 year old female with a PMH significant for multiple sclerosis who presents today for follow up. She remains overall stable with most prominent symptoms including fatigue, urinary incontinence and mild balance difficulty.        No difficulty tolerating Rebif. She is taking this as prescribed with no side effects.     Disease onset: 2016 left facial numbness and blurry vision in the left eye   Previous disease modifying therapy:   Glatiramer acetate - radiographic progression of disease   2017 Rebif     Taking vitamin D 5000 international unit(s) daily     Symptom management:  Fatigue: Feels significant fatigue, leads to a crash in the afternoon. She will take a nap frequently. Feels it is manageable overall.   Sleep is okay, interrupted by urination at times.   Bowel/bladder changes: Has tried pelvic floor therapy. No change in symptoms.   Uses about 6 pad per day. Myrbetriq helpful in the past but stopped because it became to expensive.   She will have virgil incontinence on the way to the restroom.   Gait/balance: Stable balance difficulty over the past year. No limitations on walking. Very active around her property.     The patient's medical, surgical, social, and family history were personally reviewed with the patient.  Past Medical History:   Diagnosis Date     Allergy      Depressive disorder      High cholesterol       Past Surgical History:   Procedure Laterality Date     COLONOSCOPY  1/24/2014    Procedure: COLONOSCOPY;  Colonoscopy;  Surgeon: Jose Martin Dye MD;  Location: [facility]  1970's     Social History     Tobacco Use     Smoking status: Never Smoker     Smokeless tobacco: Never Used  "  Substance Use Topics     Alcohol use: No     Alcohol/week: 0.0 standard drinks     Drug use: No     Family History   Problem Relation Age of Onset     Cancer Father          at age 70     Lipids Father      Hypertension Father      Diabetes Father         Adult Onset     Heart Disease Mother         low blood pressure, passed away 2004 lung cancer     Cancer Mother         Lung     Diabetes Mother         Adult Onset     Respiratory Sister         uses inhalers     Cancer Sister         Lung     Heart Disease Sister         Hx Rhumatic fever     Glaucoma No family hx of      Macular Degeneration No family hx of      Current Outpatient Medications   Medication     albuterol (PROAIR HFA/PROVENTIL HFA/VENTOLIN HFA) 108 (90 Base) MCG/ACT inhaler     aspirin 81 MG tablet     budesonide-formoterol (SYMBICORT) 160-4.5 MCG/ACT Inhaler     Calcium Carb-Cholecalciferol 600-800 MG-UNIT TABS     Cholecalciferol (VITAMIN D) 2000 UNITS tablet     Cranberry 500 MG CAPS     Interferon Beta-1a (REBIF REBIDOSE) 44 MCG/0.5ML SOAJ     Krill Oil Omega-3 500 MG CAPS     sertraline (ZOLOFT) 100 MG tablet     WOMENS DAILY MULTIVITAMIN OR TABS     montelukast (SINGULAIR) 10 MG tablet     No current facility-administered medications for this visit.     Allergies   Allergen Reactions     No Known Drug Allergy          Review of Systems:  14-point review of systems was completed. The pertinent positives and negatives are in the HPI.    Physical Examination   Vitals: /82   Pulse 72   Ht 1.676 m (5' 6\")   Wt 77.1 kg (170 lb)   LMP 2008   BMI 27.44 kg/m    General: Patient appears comfortable in no acute distress.   HEENT: NC/AT, no icterus, moist mucous membranes  Chest: non-labored on RA  Extremities: Warm, no edema  Skin: No rash or lesion   Psych: Affect appropriate for situation   Neuro:  Mental status: Awake, alert, attentive. Language is fluent with intact comprehension of commands.  Cranial nerves: PERRL with no " relative afferent pupillary defect, conjugate gaze, EOMI, visual fields intact, face symmetric, shoulder shrug strong, tongue protrusion/uvula midline, no dysarthria.   Motor: Normal muscle bulk and tone. No abnormal movements. 5/5 strength in 4/4 extremities.     R L  Deltoid  5 5  Biceps  5 5  Triceps 5 5  Wrist ext 5 5  Finger ext 5 5  Finger abd 5 5    Hip flexion 5 5  Knee flexion 5 5  Knee ext 5 5  Dorsiflexion 5 5    Reflexes: 2+ reflexes symmetric in biceps, brachioradialis, patellae, and achilles. No clonus, toes down-going.  Sensory: Intact to light touch, vibration. Romberg is negative with mild difficulty.   Coordination: FNF without ataxia or dysmetria.    Gait: Normal width, stride length, turn, with symmetric arm swing. Tandem walk impaired. Balance difficulty on one foot bilaterally.     Laboratory:  All laboratory data reviewed    Imaging:    MRI brain and cervical spne 9/23/22  No new or enhancing demyelinating lesions.     Stable T2 lesions in the brain and cervical cord at C2.     Assessment/Plan:  Cecile Mays is a 66 year old female who presents for follow up for multiple sclerosis. She remains clinically and radiologically stable on Rebif. We did discussed options for her symptoms including fatigue and urinary incontinence. We also discussed that she is now 66 years of age and has been stable since 2017. We discussed that the natural course of MS is to reduce in inflammation in the mid 50's or 60s and it would be reasonable to trial off of the medication and do closer MRI monitoring. We discussed the lack of randomized controlled trials in this area and that either option would be reasonable. She would prefer to stay on the medication for now and will revisit this after her next MRI.     # multiple sclerosis   -continue rebif   -blood work today   -return to clinic in one year     # urinary incontinence   -restart myrbetriq as this worked well in the past    # fatigue   -we  discussed options such as amantadine and modafinil. She will let us know if she would like to try this if her symptoms become more bothersome.   -encouraged 90 min weekly aerobic exercise     Patient seen and discussed with Dr. Del Rio.  I have reviewed the plan with the patient, who is in agreement.      Erum Calderon DO  Multiple Sclerosis Fellow     Attending physician: I saw and evaluated the patient with Dr. Calderon and I agree with her findings and plan of care as documented above.    I personally reviewed the patient's recent neuroimaging (MRI scans of the brain and cervical spine). My independent interpretation is that there is no evidence of active inflammation and no new lesions since the previous imaging.     It is her preference to remain on Rebif, which is fine.    I will check routine laboratory studies for monitoring of this medication today to include complete blood counts with differential and hepatic panel. I will also check a vitamin D level and advise the patient on supplementation with vitamin D as needed to maintain her level within the goal range of 60-80 mcg/L.    Remainder as above.    Viraj Santoyo MD   of Neurology  [facility]    Cc:  Valdez Hurd MD (PCP)  Patient

## 2022-10-03 NOTE — PATIENT INSTRUCTIONS
Continue Rebif    2.   Blood tests today    3.  We will restart Myrbetriq at 25 mg by mouth daily    4.  We discussed availability of medications for fatigue and you can let us know if you would like to try something in this regard.     Would also recommend a goal of 90 minutes of aerobic exercise weekly    5. Return to clinic in one year

## 2022-10-03 NOTE — Clinical Note
10/3/2022         RE: Cecile Mays  [address]  [place] [address]        Dear Colleague,    Thank you for referring your patient, Cecile Mays, to the [facility] [place]. Please see a copy of my visit note below.      Neurology Clinic Visit      Consult requested by: Tommy Pringle MD  [address] [place] [address]  Reason: Multiple Sclerosis ***      10/03/2022   Source of information: Patient and chart review    History of Present Symptom:  Cecile Mays is a 66 year old female with a PMH significant for ***       No difficulty tolerating rebif.     Disease onset: 2016 left facial numbness and blurry vision in the left eye   Previous disease modifying therapy:   Glatiramer acetate - radiographic progression of disease   2017 Rebif     Taking vitamin D 5000 international unit(s) daily     Symptom management:  Fatigue: Feels significant fatigue, leads to a crash in the afternoon. She will take a nap frequently. Feels it is manageable overall.   Sleep is okay, interrupted by urination at times.   Mood:  Bowel/bladder changes: Has tried pelvic floor therapy. No change in symptoms.   Uses about 6 pad per day. Myrbetric helpful in the past, became to expensive.   She will have virgil incontinence.   Gait/balance: Stable balance difficulty over the past year. No limitations on walking. Very active around her property. Balance difficulty on one foot bilaterally.     The patient's medical, surgical, social, and family history were personally reviewed with the patient.  Past Medical History:   Diagnosis Date     Allergy      Depressive disorder      High cholesterol       Past Surgical History:   Procedure Laterality Date     COLONOSCOPY  1/24/2014    Procedure: COLONOSCOPY;  Colonoscopy;  Surgeon: Jose Martin Dye MD;  Location: [facility]  1970's     Social History     Tobacco Use     Smoking  "status: Never Smoker     Smokeless tobacco: Never Used   Substance Use Topics     Alcohol use: No     Alcohol/week: 0.0 standard drinks     Drug use: No     Family History   Problem Relation Age of Onset     Cancer Father          at age 70     Lipids Father      Hypertension Father      Diabetes Father         Adult Onset     Heart Disease Mother         low blood pressure, passed away 2004 lung cancer     Cancer Mother         Lung     Diabetes Mother         Adult Onset     Respiratory Sister         uses inhalers     Cancer Sister         Lung     Heart Disease Sister         Hx Rhumatic fever     Glaucoma No family hx of      Macular Degeneration No family hx of      Current Outpatient Medications   Medication     albuterol (PROAIR HFA/PROVENTIL HFA/VENTOLIN HFA) 108 (90 Base) MCG/ACT inhaler     aspirin 81 MG tablet     budesonide-formoterol (SYMBICORT) 160-4.5 MCG/ACT Inhaler     Calcium Carb-Cholecalciferol 600-800 MG-UNIT TABS     Cholecalciferol (VITAMIN D) 2000 UNITS tablet     Cranberry 500 MG CAPS     Interferon Beta-1a (REBIF REBIDOSE) 44 MCG/0.5ML SOAJ     Krill Oil Omega-3 500 MG CAPS     sertraline (ZOLOFT) 100 MG tablet     WOMENS DAILY MULTIVITAMIN OR TABS     montelukast (SINGULAIR) 10 MG tablet     No current facility-administered medications for this visit.     Allergies   Allergen Reactions     No Known Drug Allergy          Review of Systems:  14-point review of systems was completed. The pertinent positives and negatives are in the HPI.    Physical Examination   Vitals: /82   Pulse 72   Ht 1.676 m (5' 6\")   Wt 77.1 kg (170 lb)   LMP 2008   BMI 27.44 kg/m    General: Patient appears comfortable in no acute distress.   HEENT: NC/AT, no icterus, moist mucous membranes  Chest: non-labored on RA  Extremities: Warm, no edema  Skin: No rash or lesion   Psych: Affect appropriate for situation   Neuro:  Mental status: Awake, alert, attentive. Language is fluent with intact " comprehension of commands.  Cranial nerves: PERRL with no relative afferent pupillary defect, conjugate gaze, EOMI, visual fields intact, face symmetric, shoulder shrug strong, tongue protrusion/uvula midline, no dysarthria.   Motor: Normal muscle bulk and tone. No abnormal movements. 5/5 strength in 4/4 extremities.     R L  Deltoid  5 5  Biceps  5 5  Triceps  5 5  Wrist ext 5 5  Finger ext 5 5  Finger abd 5 5    Hip flexion 5 5  Knee flexion 5 5  Knee ext 5 5  Dorsiflexion 5 5    Reflexes: ***reflexes symmetric in biceps, brachioradialis, patellae, and achilles. No clonus, toes down-going.  Sensory: Intact to light touch, pin, vibration, and proprioception. Romberg is negative.   Coordination: FNF and HS without ataxia or dysmetria.    Gait: Normal width, stride length, turn, with symmetric arm swing. Tandem walk intact.    Laboratory:  {Lab options               :[id number]}    Imaging:    Assessment/Plan:  Cecile Mays is a 66 year old female who presents for     Patient seen and discussed with Dr. Del Rio.  I have reviewed the plan with the patient, who is in agreement.      Erum Calderon, DO  Multiple Sclerosis Fellow               Again, thank you for allowing me to participate in the care of your patient.        Sincerely,        Viraj Santoyo MD

## 2022-10-03 NOTE — NURSING NOTE
"Cecile Mays's goals for this visit include: return  She requests these members of her care team be copied on today's visit information:     PCP: Valdez Hurd    Referring Provider:  Tommy Pringle MD  [address] [place] [address]    /82   Pulse 72   Ht 1.676 m (5' 6\")   Wt 77.1 kg (170 lb)   LMP 02/05/2008   BMI 27.44 kg/m        "

## 2022-10-04 LAB — DEPRECATED CALCIDIOL+CALCIFEROL SERPL-MC: 83 UG/L (ref 20–75)

## 2022-10-05 ENCOUNTER — MYC MEDICAL ADVICE (OUTPATIENT)
Dept: NEUROLOGY | Facility: CLINIC | Age: 67
End: 2022-10-05

## 2022-10-09 ENCOUNTER — HEALTH MAINTENANCE LETTER (OUTPATIENT)
Age: 67
End: 2022-10-09

## 2022-11-28 ASSESSMENT — ENCOUNTER SYMPTOMS
HEMATOCHEZIA: 0
NAUSEA: 0
DIARRHEA: 0
DYSURIA: 0
BREAST MASS: 0
HEADACHES: 0
ARTHRALGIAS: 0
EYE PAIN: 0
MYALGIAS: 0
PARESTHESIAS: 0
FEVER: 0
CHILLS: 0
SORE THROAT: 0
WEAKNESS: 0
HEARTBURN: 0
COUGH: 1
PALPITATIONS: 0
ABDOMINAL PAIN: 0
DIZZINESS: 0
NERVOUS/ANXIOUS: 0
JOINT SWELLING: 0
SHORTNESS OF BREATH: 0
FREQUENCY: 1
CONSTIPATION: 0
HEMATURIA: 0

## 2022-11-28 ASSESSMENT — ACTIVITIES OF DAILY LIVING (ADL): CURRENT_FUNCTION: NO ASSISTANCE NEEDED

## 2022-12-01 ENCOUNTER — OFFICE VISIT (OUTPATIENT)
Dept: FAMILY MEDICINE | Facility: CLINIC | Age: 67
End: 2022-12-01
Payer: MEDICARE

## 2022-12-01 ENCOUNTER — MYC MEDICAL ADVICE (OUTPATIENT)
Dept: FAMILY MEDICINE | Facility: CLINIC | Age: 67
End: 2022-12-01

## 2022-12-01 VITALS
HEART RATE: 83 BPM | TEMPERATURE: 98 F | RESPIRATION RATE: 18 BRPM | OXYGEN SATURATION: 94 % | SYSTOLIC BLOOD PRESSURE: 126 MMHG | BODY MASS INDEX: 27.48 KG/M2 | HEIGHT: 66 IN | WEIGHT: 171 LBS | DIASTOLIC BLOOD PRESSURE: 84 MMHG

## 2022-12-01 DIAGNOSIS — N39.41 URGE INCONTINENCE: ICD-10-CM

## 2022-12-01 DIAGNOSIS — Z78.0 ASYMPTOMATIC MENOPAUSAL STATE: ICD-10-CM

## 2022-12-01 DIAGNOSIS — E67.3 HYPERVITAMINOSIS D: ICD-10-CM

## 2022-12-01 DIAGNOSIS — Z00.00 ANNUAL PHYSICAL EXAM: Primary | ICD-10-CM

## 2022-12-01 DIAGNOSIS — J06.9 ACUTE URI: ICD-10-CM

## 2022-12-01 LAB
ANION GAP SERPL CALCULATED.3IONS-SCNC: 2 MMOL/L (ref 3–14)
BUN SERPL-MCNC: 11 MG/DL (ref 7–30)
CALCIUM SERPL-MCNC: 9.2 MG/DL (ref 8.5–10.1)
CHLORIDE BLD-SCNC: 109 MMOL/L (ref 94–109)
CHOLEST SERPL-MCNC: 160 MG/DL
CO2 SERPL-SCNC: 30 MMOL/L (ref 20–32)
CREAT SERPL-MCNC: 0.75 MG/DL (ref 0.52–1.04)
FASTING STATUS PATIENT QL REPORTED: NO
GFR SERPL CREATININE-BSD FRML MDRD: 87 ML/MIN/1.73M2
GLUCOSE BLD-MCNC: 101 MG/DL (ref 70–99)
HDLC SERPL-MCNC: 37 MG/DL
LDLC SERPL CALC-MCNC: 99 MG/DL
NONHDLC SERPL-MCNC: 123 MG/DL
POTASSIUM BLD-SCNC: 3.6 MMOL/L (ref 3.4–5.3)
SODIUM SERPL-SCNC: 141 MMOL/L (ref 133–144)
TRIGL SERPL-MCNC: 122 MG/DL

## 2022-12-01 PROCEDURE — 80061 LIPID PANEL: CPT | Performed by: FAMILY MEDICINE

## 2022-12-01 PROCEDURE — G0009 ADMIN PNEUMOCOCCAL VACCINE: HCPCS | Performed by: FAMILY MEDICINE

## 2022-12-01 PROCEDURE — G0438 PPPS, INITIAL VISIT: HCPCS | Performed by: FAMILY MEDICINE

## 2022-12-01 PROCEDURE — 80048 BASIC METABOLIC PNL TOTAL CA: CPT | Performed by: FAMILY MEDICINE

## 2022-12-01 PROCEDURE — 99214 OFFICE O/P EST MOD 30 MIN: CPT | Mod: 25 | Performed by: FAMILY MEDICINE

## 2022-12-01 PROCEDURE — 90677 PCV20 VACCINE IM: CPT | Performed by: FAMILY MEDICINE

## 2022-12-01 PROCEDURE — 36415 COLL VENOUS BLD VENIPUNCTURE: CPT | Performed by: FAMILY MEDICINE

## 2022-12-01 RX ORDER — AZITHROMYCIN 250 MG/1
TABLET, FILM COATED ORAL
Qty: 6 TABLET | Refills: 0 | Status: SHIPPED | OUTPATIENT
Start: 2022-12-01 | End: 2022-12-06

## 2022-12-01 ASSESSMENT — ENCOUNTER SYMPTOMS
JOINT SWELLING: 0
NERVOUS/ANXIOUS: 0
HEARTBURN: 0
DIZZINESS: 0
ABDOMINAL PAIN: 0
PARESTHESIAS: 0
HEMATURIA: 0
DYSURIA: 0
SORE THROAT: 0
CHILLS: 0
COUGH: 1
HEMATOCHEZIA: 0
FEVER: 0
EYE PAIN: 0
CONSTIPATION: 0
WEAKNESS: 0
BREAST MASS: 0
DIARRHEA: 0
MYALGIAS: 0
HEADACHES: 0
ARTHRALGIAS: 0
NAUSEA: 0
FREQUENCY: 1
SHORTNESS OF BREATH: 0
PALPITATIONS: 0

## 2022-12-01 ASSESSMENT — ANXIETY QUESTIONNAIRES
8. IF YOU CHECKED OFF ANY PROBLEMS, HOW DIFFICULT HAVE THESE MADE IT FOR YOU TO DO YOUR WORK, TAKE CARE OF THINGS AT HOME, OR GET ALONG WITH OTHER PEOPLE?: NOT DIFFICULT AT ALL
4. TROUBLE RELAXING: SEVERAL DAYS
2. NOT BEING ABLE TO STOP OR CONTROL WORRYING: NOT AT ALL
3. WORRYING TOO MUCH ABOUT DIFFERENT THINGS: NOT AT ALL
7. FEELING AFRAID AS IF SOMETHING AWFUL MIGHT HAPPEN: NOT AT ALL
1. FEELING NERVOUS, ANXIOUS, OR ON EDGE: NOT AT ALL
GAD7 TOTAL SCORE: 2
5. BEING SO RESTLESS THAT IT IS HARD TO SIT STILL: NOT AT ALL
6. BECOMING EASILY ANNOYED OR IRRITABLE: SEVERAL DAYS
GAD7 TOTAL SCORE: 2
IF YOU CHECKED OFF ANY PROBLEMS ON THIS QUESTIONNAIRE, HOW DIFFICULT HAVE THESE PROBLEMS MADE IT FOR YOU TO DO YOUR WORK, TAKE CARE OF THINGS AT HOME, OR GET ALONG WITH OTHER PEOPLE: NOT DIFFICULT AT ALL
7. FEELING AFRAID AS IF SOMETHING AWFUL MIGHT HAPPEN: NOT AT ALL
GAD7 TOTAL SCORE: 2

## 2022-12-01 ASSESSMENT — PATIENT HEALTH QUESTIONNAIRE - PHQ9
SUM OF ALL RESPONSES TO PHQ QUESTIONS 1-9: 2
10. IF YOU CHECKED OFF ANY PROBLEMS, HOW DIFFICULT HAVE THESE PROBLEMS MADE IT FOR YOU TO DO YOUR WORK, TAKE CARE OF THINGS AT HOME, OR GET ALONG WITH OTHER PEOPLE: NOT DIFFICULT AT ALL
SUM OF ALL RESPONSES TO PHQ QUESTIONS 1-9: 2

## 2022-12-01 ASSESSMENT — PAIN SCALES - GENERAL: PAINLEVEL: NO PAIN (0)

## 2022-12-01 ASSESSMENT — ACTIVITIES OF DAILY LIVING (ADL): CURRENT_FUNCTION: NO ASSISTANCE NEEDED

## 2022-12-01 NOTE — PROGRESS NOTES
"SUBJECTIVE:   Yue is a 67 year old who presents for Preventive Visit.    Are you in the first 12 months of your Medicare coverage?  No    Healthy Habits:     In general, how would you rate your overall health?  Good    Frequency of exercise:  None    Do you usually eat at least 4 servings of fruit and vegetables a day, include whole grains    & fiber and avoid regularly eating high fat or \"junk\" foods?  Yes    Taking medications regularly:  Yes    Ability to successfully perform activities of daily living:  No assistance needed    Home Safety:  No safety concerns identified    Hearing Impairment:  No hearing concerns    In the past 6 months, have you been bothered by leaking of urine? Yes    In general, how would you rate your overall mental or emotional health?  Good      PHQ-2 Total Score: 0    Additional concerns today:  No    Feels well overall  Has had 1 to 2 weeks of cough and congestion, and occasional wheeze, decreased energy but no fever  Is treated through the [facility] clinic.  She has an elevated vitamin D level, but that is the range they would like her at according to the patient    Have you ever done Advance Care Planning? (For example, a Health Directive, POLST, or a discussion with a medical provider or your loved ones about your wishes): Yes, patient states has an Advance Care Planning document and will bring a copy to the clinic.       Fall risk  Fallen 2 or more times in the past year?: No  Any fall with injury in the past year?: No    Cognitive Screening   1) Repeat 3 items (Leader, Season, Table)    2) Clock draw: ABNORMAL ljk  3) 3 item recall: Recalls 3 objects  Results: 3 items recalled: COGNITIVE IMPAIRMENT LESS LIKELY    Mini-CogTM Copyright NIKITA Figueroa. Licensed by the author for use in [facility]; reprinted with permission (michelle@.[facility]). All rights reserved.          Reviewed and updated as needed this visit by clinical staff   Tobacco  Allergies  Meds              Reviewed and " updated as needed this visit by Provider                 Social History     Tobacco Use     Smoking status: Never     Smokeless tobacco: Never   Substance Use Topics     Alcohol use: No     Alcohol/week: 0.0 standard drinks         Alcohol Use 11/28/2022   Prescreen: >3 drinks/day or >7 drinks/week? Not Applicable   Prescreen: >3 drinks/day or >7 drinks/week? -               Current providers sharing in care for this patient include:   Patient Care Team:  Valdez Hurd MD as PCP - General (Family Medicine)  Ashely Kwon MD (Neurology)  Viraj Santoyo MD as Kevon Grier MD as MD (Ophthalmology)  Reynaldo Ho MD as MD (Ophthalmology)  Viraj Santoyo MD as Assigned Neuroscience Provider  Valdez Hurd MD as Assigned PCP  Kevon Fortune MD as Assigned Surgical Provider  Kevon Fortune MD as MD (Ophthalmology)  Nato Ibrahim MD as MD (Urology)    The following health maintenance items are reviewed in Epic and correct as of today:  Health Maintenance   Topic Date Due     DEXA  Never done     MEDICARE ANNUAL WELLNESS VISIT  03/11/2022     LEATHA ASSESSMENT  06/01/2023     PHQ-9  06/01/2023     ANNUAL REVIEW OF HM ORDERS  12/01/2023     FALL RISK ASSESSMENT  12/01/2023     COLORECTAL CANCER SCREENING  01/24/2024     MAMMO SCREENING  04/26/2024     DTAP/TDAP/TD IMMUNIZATION (4 - Td or Tdap) 01/27/2027     LIPID  12/01/2027     ADVANCE CARE PLANNING  12/01/2027     HEPATITIS C SCREENING  Completed     DEPRESSION ACTION PLAN  Completed     INFLUENZA VACCINE  Completed     Pneumococcal Vaccine: 65+ Years  Completed     ZOSTER IMMUNIZATION  Completed     COVID-19 Vaccine  Completed     IPV IMMUNIZATION  Aged Out     MENINGITIS IMMUNIZATION  Aged Out     PAP  Discontinued           Breast CA Risk Assessment (FHS-7) 3/10/2021   Do you have a family history of breast, colon, or ovarian cancer? No / Unknown           Pertinent mammograms are  "reviewed under the imaging tab.    Review of Systems   Constitutional: Negative for chills and fever.   HENT: Positive for congestion. Negative for ear pain, hearing loss and sore throat.    Eyes: Negative for pain and visual disturbance.   Respiratory: Positive for cough. Negative for shortness of breath.    Cardiovascular: Negative for chest pain, palpitations and peripheral edema.   Gastrointestinal: Negative for abdominal pain, constipation, diarrhea, heartburn, hematochezia and nausea.   Breasts:  Negative for tenderness, breast mass and discharge.   Genitourinary: Positive for frequency and urgency. Negative for dysuria, genital sores, hematuria, pelvic pain, vaginal bleeding and vaginal discharge.   Musculoskeletal: Negative for arthralgias, joint swelling and myalgias.   Skin: Negative for rash.   Neurological: Negative for dizziness, weakness, headaches and paresthesias.   Psychiatric/Behavioral: Negative for mood changes. The patient is not nervous/anxious.          OBJECTIVE:   /84 (Cuff Size: Adult Regular)   Pulse 83   Temp 98  F (36.7  C) (Temporal)   Resp 18   Ht 1.674 m (5' 5.9\")   Wt 77.6 kg (171 lb)   LMP 02/05/2008   SpO2 94%   BMI 27.68 kg/m   Estimated body mass index is 27.68 kg/m  as calculated from the following:    Height as of this encounter: 1.674 m (5' 5.9\").    Weight as of this encounter: 77.6 kg (171 lb).  Physical Exam  GENERAL: healthy, alert and no distress  NECK: no adenopathy, no asymmetry, masses, or scars and thyroid normal to palpation  RESP: lungs clear to auscultation - no rales, rhonchi or wheezes  CV: regular rate and rhythm, normal S1 S2, no S3 or S4, no murmur, click or rub, no peripheral edema and peripheral pulses strong  ABDOMEN: soft, nontender, no hepatosplenomegaly, no masses and bowel sounds normal  MS: no gross musculoskeletal defects noted, no edema    Diagnostic Test Results:  Labs reviewed in Epic    ASSESSMENT / PLAN:       ICD-10-CM    1. Annual " "physical exam  Z00.00 Lipid panel reflex to direct LDL Fasting     Lipid panel reflex to direct LDL Fasting      2. Asymptomatic menopausal state  Z78.0 DEXA HIP/PELVIS/SPINE - Future      3. Hypervitaminosis D  E67.3 Basic metabolic panel  (Ca, Cl, CO2, Creat, Gluc, K, Na, BUN)     Basic metabolic panel  (Ca, Cl, CO2, Creat, Gluc, K, Na, BUN)      4. Urge incontinence  N39.41 Adult Uro/Gyn  Referral      5. Acute URI  J06.9 azithromycin (ZITHROMAX) 250 MG tablet        Annual topics reviewed  Elevated vitamin D levels.  Apparently this is the neurology goal.  No change.  Screen for osteoporosis with a DEXA scan  Urge incontinence not improved with physical therapy or initial medication.  Refer to GYN  Acute bronchitis.  Antibiotics          COUNSELING:  Reviewed preventive health counseling, as reflected in patient instructions      BMI:   Estimated body mass index is 27.68 kg/m  as calculated from the following:    Height as of this encounter: 1.674 m (5' 5.9\").    Weight as of this encounter: 77.6 kg (171 lb).         She reports that she has never smoked. She has never used smokeless tobacco.      Appropriate preventive services were discussed with this patient, including applicable screening as appropriate for cardiovascular disease, diabetes, osteopenia/osteoporosis, and glaucoma.  As appropriate for age/gender, discussed screening for colorectal cancer, prostate cancer, breast cancer, and cervical cancer. Checklist reviewing preventive services available has been given to the patient.    Reviewed patients plan of care and provided an AVS. The Basic Care Plan (routine screening as documented in Health Maintenance) for Cecile meets the Care Plan requirement. This Care Plan has been established and reviewed with the Patient.      Valdez Hurd MD  [facility]    Identified Health Risks:  "

## 2022-12-01 NOTE — NURSING NOTE
Prior to immunization administration, verified patients identity using patient s name and date of birth. Please see Immunization Activity for additional information.     Screening Questionnaire for Adult Immunization    Are you sick today?   No   Do you have allergies to medications, food, a vaccine component or latex?   No   Have you ever had a serious reaction after receiving a vaccination?   No   Do you have a long-term health problem with heart, lung, kidney, or metabolic disease (e.g., diabetes), asthma, a blood disorder, no spleen, complement component deficiency, a cochlear implant, or a spinal fluid leak?  Are you on long-term aspirin therapy?   No   Do you have cancer, leukemia, HIV/AIDS, or any other immune system problem?   No   Do you have a parent, brother, or sister with an immune system problem?   No   In the past 3 months, have you taken medications that affect  your immune system, such as prednisone, other steroids, or anticancer drugs; drugs for the treatment of rheumatoid arthritis, Crohn s disease, or psoriasis; or have you had radiation treatments?   No   Have you had a seizure, or a brain or other nervous system problem?   No   During the past year, have you received a transfusion of blood or blood    products, or been given immune (gamma) globulin or antiviral drug?   No   For women: Are you pregnant or is there a chance you could become       pregnant during the next month?   No   Have you received any vaccinations in the past 4 weeks?   No     Immunization questionnaire answers were all negative.        Per orders of Dr. Hurd, injection of prevnar 20 given by Sue Shannon CMA. Patient instructed to remain in clinic for 15 minutes afterwards, and to report any adverse reaction to me immediately.       Screening performed by Sue Shannon CMA on 12/1/2022 at 11:46 AM.

## 2022-12-05 ENCOUNTER — MYC MEDICAL ADVICE (OUTPATIENT)
Dept: FAMILY MEDICINE | Facility: CLINIC | Age: 67
End: 2022-12-05

## 2022-12-08 ENCOUNTER — HOSPITAL ENCOUNTER (OUTPATIENT)
Dept: BONE DENSITY | Facility: CLINIC | Age: 67
Discharge: HOME | End: 2022-12-08
Attending: FAMILY MEDICINE | Admitting: FAMILY MEDICINE
Payer: MEDICARE

## 2022-12-08 DIAGNOSIS — Z78.0 ASYMPTOMATIC MENOPAUSAL STATE: ICD-10-CM

## 2022-12-08 PROCEDURE — 77080 DXA BONE DENSITY AXIAL: CPT

## 2022-12-16 DIAGNOSIS — F33.0 MAJOR DEPRESSIVE DISORDER, RECURRENT EPISODE, MILD (H): ICD-10-CM

## 2022-12-19 RX ORDER — SERTRALINE HYDROCHLORIDE 100 MG/1
TABLET, FILM COATED ORAL
Qty: 135 TABLET | Refills: 0 | Status: SHIPPED | OUTPATIENT
Start: 2022-12-19 | End: 2023-03-17

## 2022-12-19 NOTE — TELEPHONE ENCOUNTER
Prescription approved per [facility] Refill Protocol.    Azucena Henley,BSN, RN

## 2023-01-01 NOTE — PROGRESS NOTES
"Cecile Mays is a 64 year old female who is being evaluated via a billable telephone visit.      The patient has been notified of following:     \"This telephone visit will be conducted via a call between you and your physician/provider. We have found that certain health care needs can be provided without the need for a physical exam.  This service lets us provide the care you need with a short phone conversation.  If a prescription is necessary we can send it directly to your pharmacy.  If lab work is needed we can place an order for that and you can then stop by our lab to have the test done at a later time.    Telephone visits are billed at different rates depending on your insurance coverage. During this emergency period, for some insurers they may be billed the same as an in-person visit.  Please reach out to your insurance provider with any questions.    If during the course of the call the physician/provider feels a telephone visit is not appropriate, you will not be charged for this service.\"    Patient has given verbal consent for Telephone visit?  Yes    What phone number would you like to be contacted at? [phone]    How would you like to obtain your AVS? Blanco    Subjective     Cecile Mays is a 64 year old female who presents via phone visit today for the following health issues:    HPI     Anxiety Follow-Up    How are you doing with your anxiety since your last visit? No change    Are you having other symptoms that might be associated with anxiety? Yes:  coping mechanism is hard    Have you had a significant life event? OTHER: Covid, politics, family     Are you feeling depressed? No    Do you have any concerns with your use of alcohol or other drugs? No    Social History     Tobacco Use     Smoking status: Never Smoker     Smokeless tobacco: Never Used   Substance Use Topics     Alcohol use: No     Alcohol/week: 0.0 standard drinks     Drug use: No     LEATHA-7 SCORE 1/8/2018 " 10/31/2018 7/1/2020   Total Score 13 11 0     PHQ 6/21/2019 12/19/2019 7/1/2020   PHQ-9 Total Score 2 0 1   Q9: Thoughts of better off dead/self-harm past 2 weeks Not at all Not at all Not at all     Last PHQ-9 7/1/2020   1.  Little interest or pleasure in doing things 0   2.  Feeling down, depressed, or hopeless 1   3.  Trouble falling or staying asleep, or sleeping too much 0   4.  Feeling tired or having little energy 0   5.  Poor appetite or overeating 0   6.  Feeling bad about yourself 0   7.  Trouble concentrating 0   8.  Moving slowly or restless 0   Q9: Thoughts of better off dead/self-harm past 2 weeks 0   PHQ-9 Total Score 1   Difficulty at work, home, or with people Not difficult at all     LEATHA-7  7/1/2020   1. Feeling nervous, anxious, or on edge 0   2. Not being able to stop or control worrying 0   3. Worrying too much about different things 0   4. Trouble relaxing 0   5. Being so restless that it is hard to sit still 0   6. Becoming easily annoyed or irritable 0   7. Feeling afraid, as if something awful might happen 0   LEATHA-7 Total Score 0   If you checked any problems, how difficult have they made it for you to do your work, take care of things at home, or get along with other people? Not difficult at all       MULTIPLE SCHLEROSIS  -gotten more wobbly  -eyesight struggling  -uncontrollable issues      How many days per week do you miss taking your medication? 0    Genitourinary - Female  Onset/Duration: 3-4 weeks  Description:   Painful urination (Dysuria): no           Frequency: YES  Blood in urine (Hematuria): no  Delay in urine (Hesitency): no  Progression of Symptoms:  worsening  Accompanying Signs & Symptoms:  Fever/chills: no  Flank pain: no  Nausea and vomiting: no  Vaginal symptoms: none  Abdominal/Pelvic Pain: no  History:   History of frequent UTI s: YES  History of kidney stones: no  Sexually Active: YES  Precipitating or alleviating factors: no caffeine later in day, go when I 1st have  sx, limit liquids in evenings.  Therapies tried and outcome:  cranberry     **will be tested for COVID-19 today also      Patient has not checked in with me for some time and she heard this week that I will be retiring.  She wants to have a visit today to discuss concerns.  Vision is becoming poorer and poor and patient understands MS may be contributing.  She has not had an eye exam for 18 months.  She is set up for one soon.  We discussed the possibility of cataracts which are correctable.  She fears that she might lose her license if vision becomes too poor.  This would even make her feel more isolated.    Patient's mood despite COVID isolation, dealing with MS, and her brother whom she was close to  this summer, patient feels her mood is adequate and medications are doing her job.  She has support of  and friends.    Patient continues to have marked urgency of urination.  Sometimes she feels she may have a bladder infection and would like to have antibiotics available as before.  Oxybutynin dried her so much she cannot take that medication.  Along that line she has dry eyes felt to be related to Lasix surgery done earlier.  She would be interested in trying another medication if the side effects are not too severe.    Also patient has been having some GI symptoms, some increased respiratory symptoms that she attributed to probably her underlying allergies/asthma type problems, then she lost taste.  She is being tested for COVID today.    Review of Systems   Constitutional, HEENT, cardiovascular, pulmonary, gi and gu systems are negative, except as otherwise noted.       Objective          Vitals:  No vitals were obtained today due to virtual visit.    healthy, alert and no distress  PSYCH: Alert and oriented times 3; coherent speech, normal   rate and volume, able to articulate logical thoughts, able   to abstract reason, no tangential thoughts, no hallucinations   or delusions  Her affect is  "normal  RESP: No cough, no audible wheezing, able to talk in full sentences  Remainder of exam unable to be completed due to telephone visits    Patient has had elevated sugar so we will check basic profile.  She did have liver enzymes checked by her MS doctor earlier this fall.  Had thyroid checked earlier this fall.  She is due for other lab.        Assessment/Plan:    Assessment & Plan     Multiple sclerosis (H)  Followed by the specialist but actually doing quite well.  For her the most difficult is knowing that she should not push herself to get through things.  She has always been somebody who has pushed herself.  I can tell by our discussion today that she is learning that this is detrimental to her.  - zinc gluconate 50 MG tablet; Take 1 tablet (50 mg) by mouth daily  - CBC with platelets and differential; Future  - **Basic metabolic panel FUTURE anytime; Future    Hyperlipidemia LDL goal <130  We will check labs.  Treatment may or may not be offered pending the result.  Side effects of medication may be present or significant  - Lipid panel reflex to direct LDL Fasting; Future    Urge incontinence  Trying mirabegron for urinary urgency.  This should be different than oxybutynin.  Starting with a few tablets to see if she tolerates it for a month and then could order more if she does or we could increase dose if no side effects but not effective.  - mirabegron (MYRBETRIQ) 25 MG 24 hr tablet; Take 1 tablet (25 mg) by mouth daily    Vision changes will be addressed by ophthalmology.     BMI:   Estimated body mass index is 28.59 kg/m  as calculated from the following:    Height as of 9/17/20: 1.689 m (5' 6.5\").    Weight as of 9/17/20: 81.6 kg (179 lb 12.8 oz).   Weight management plan: Unsure if she is overweight at this time but I know she works on this and tries to stay as active as possible         MEDICATIONS:  Continue current medications without change  Regular exercise  See Patient " Instructions    Return in about 4 months (around 3/13/2021) for Multiple sclerosis, mental health.    Tommy Pringle MD  [facility]    Phone call duration: 24 minutes 8:31 AM to 8:55 AM               Site: Sternum (03 May 2023 05:07)  Bilirubin: 6.8 (03 May 2023 05:07)  Bilirubin: 6.1 (02 May 2023 17:03)  Site: Sternum (02 May 2023 17:03)

## 2023-02-02 ENCOUNTER — TELEPHONE (OUTPATIENT)
Dept: NEUROLOGY | Facility: CLINIC | Age: 68
End: 2023-02-02
Payer: MEDICARE

## 2023-02-02 DIAGNOSIS — G35 MULTIPLE SCLEROSIS (H): ICD-10-CM

## 2023-02-02 RX ORDER — INTERFERON BETA-1A 44 UG/.5ML
44 INJECTION, SOLUTION SUBCUTANEOUS
Qty: 6 ML | Refills: 11 | Status: SHIPPED | OUTPATIENT
Start: 2023-02-03 | End: 2023-02-15

## 2023-02-02 NOTE — CONFIDENTIAL NOTE
Office visit notes faxed to the number listed below. Confirmation of successful delivery was received.     Maylin Bearden RN Care Coordinator   Neurology/Neurosurgery/PM&R/ Pain Management

## 2023-02-02 NOTE — TELEPHONE ENCOUNTER
[facility] Call Center    Phone Message    May a detailed message be left on voicemail: no     Reason for Call: Medication Question or concern regarding medication   Prescription Clarification  Name of Medication: Interferon Beta-1a (REBIF REBIDOSE) 44 MCG/0.5ML SOAJ  Prescribing Provider: Viraj Santoyo MD   Pharmacy: [place], [place] - [address]    What on the order needs clarification? Rachel from [facility] is calling to request a new Rx to be sent to them. Pt is on the co pay assistance program so a new Rx is needed.     Also, Rachel would like Dr. Santoyo to fax over the most recent clinician notes (10/03/22). Fax:  [phone]      Action Taken: Message routed to:  Adult Clinics: Neurology p [id number]    Travel Screening: Not Applicable

## 2023-02-02 NOTE — TELEPHONE ENCOUNTER
Medication: Interferon Beta-1a (REBIF REBIDOSE) 44 MCG/0.5ML SOAJ  Sig: Inject 44 mcg Subcutaneous three times a week - Subcutaneous  Date last written: 2/3/21  Dispensed amount: 6ml  Refills: 11    Requested Pharmacy: [facility] [place] [facility]    Pt's last office visit: 10/3/22  Next scheduled office visit: 10/2/23      Per the RN/LPN medication refill protocol, writer is able to refill this request. I did confirm that they need an order for Brand Rebif so this is what will be sent.     Maylin Bearden, RN Care Coordinator   Neurology/Neurosurgery/PM&R/ Pain Management

## 2023-02-14 ENCOUNTER — OFFICE VISIT (OUTPATIENT)
Dept: UROLOGY | Facility: CLINIC | Age: 68
End: 2023-02-14
Attending: FAMILY MEDICINE
Payer: MEDICARE

## 2023-02-14 VITALS
BODY MASS INDEX: 27.32 KG/M2 | DIASTOLIC BLOOD PRESSURE: 64 MMHG | HEART RATE: 68 BPM | SYSTOLIC BLOOD PRESSURE: 114 MMHG | WEIGHT: 170 LBS | HEIGHT: 66 IN

## 2023-02-14 DIAGNOSIS — N39.41 URGE INCONTINENCE: ICD-10-CM

## 2023-02-14 LAB
ALBUMIN UR-MCNC: NEGATIVE MG/DL
APPEARANCE UR: CLEAR
BILIRUB UR QL STRIP: NEGATIVE
COLOR UR AUTO: YELLOW
GLUCOSE UR STRIP-MCNC: NEGATIVE MG/DL
HGB UR QL STRIP: NEGATIVE
HYALINE CASTS: 3 /LPF
KETONES UR STRIP-MCNC: NEGATIVE MG/DL
LEUKOCYTE ESTERASE UR QL STRIP: ABNORMAL
MUCOUS THREADS #/AREA URNS LPF: PRESENT /LPF
NITRATE UR QL: NEGATIVE
PH UR STRIP: 6 [PH] (ref 5–7)
RBC URINE: 1 /HPF
SP GR UR STRIP: 1.02 (ref 1–1.03)
SQUAMOUS EPITHELIAL: 1 /HPF
UROBILINOGEN UR STRIP-MCNC: NORMAL MG/DL
WBC URINE: 10 /HPF

## 2023-02-14 PROCEDURE — 81001 URINALYSIS AUTO W/SCOPE: CPT | Performed by: UROLOGY

## 2023-02-14 PROCEDURE — 52000 CYSTOURETHROSCOPY: CPT | Performed by: UROLOGY

## 2023-02-14 PROCEDURE — 99205 OFFICE O/P NEW HI 60 MIN: CPT | Mod: 25 | Performed by: UROLOGY

## 2023-02-14 RX ORDER — TOLTERODINE 4 MG/1
4 CAPSULE, EXTENDED RELEASE ORAL DAILY
Qty: 60 CAPSULE | Refills: 0 | Status: SHIPPED | OUTPATIENT
Start: 2023-02-14 | End: 2023-04-26

## 2023-02-14 ASSESSMENT — PAIN SCALES - GENERAL: PAINLEVEL: NO PAIN (0)

## 2023-02-14 NOTE — PROGRESS NOTES
"Cecile Mays is a 67 year old female seen in consultation for incontinence. Consult from Valdez Hurd.      Pt reports several yr hx progressive mixed UI, urge > stress, wears 2-6 Depend per day, also pad at nite (essentially dry in the morning).    Was on trial of Myrbetriq for 3 mos, did not note any improvement. Has not been on other meds. Denies bladder surgery, success with Kegel's.    Denies dysuria, gross hematuria. Voids about q 1-2 hours during the day, noc x 2.     Never pregnant, no hyster, not on HRT. Denies constipation, fecal incontinence. Some loose stools with certain foods.    MS dx in 2016; now with some balance issues, \"foggy brain.\"    Drinks 2 black tea, 32 oz of Saxman per day. (Did not complete voiding diary).      Reviewed PMH in detail including depression, adjustment disorder with anxious mood, MS      Past Medical History:   Diagnosis Date     Allergy      Depressive disorder      High cholesterol        Past Surgical History:   Procedure Laterality Date     COLONOSCOPY  1/24/2014    Procedure: COLONOSCOPY;  Colonoscopy;  Surgeon: Jose Martin Dye MD;  Location: [facility]  1970's       Social History     Socioeconomic History     Marital status:      Spouse name: Luis     Number of children: 0     Years of education: 12     Highest education level: Not on file   Occupational History     Occupation:      Employer: [employer]   Tobacco Use     Smoking status: Never     Smokeless tobacco: Never   Substance and Sexual Activity     Alcohol use: No     Alcohol/week: 0.0 standard drinks     Drug use: No     Sexual activity: Yes     Partners: Male     Birth control/protection: Surgical, Female Surgical     Comment: Hx: Tubal Ligation   Other Topics Concern      Service No     Blood Transfusions No     Caffeine Concern Yes     Comment: coffee/tea: 2c/d     Occupational Exposure No     Hobby Hazards No     " Sleep Concern No     Stress Concern No     Weight Concern No     Special Diet Yes     Comment: Eat  Healthy     Back Care Yes     Comment: Hx: chiropractor and PT     Exercise Yes     Comment: Yoga and swimming 2/wk     Bike Helmet No     Comment: N/A     Seat Belt Yes     Self-Exams Yes     Comment: Monthly     Parent/sibling w/ CABG, MI or angioplasty before 65F 55M? No   Social History Narrative     Not on file     Social Determinants of Health     Financial Resource Strain: Not on file   Food Insecurity: Not on file   Transportation Needs: Not on file   Physical Activity: Not on file   Stress: Not on file   Social Connections: Not on file   Intimate Partner Violence: Not on file   Housing Stability: Not on file       Current Outpatient Medications   Medication Sig Dispense Refill     albuterol (PROAIR HFA/PROVENTIL HFA/VENTOLIN HFA) 108 (90 Base) MCG/ACT inhaler Inhale 2 puffs into the lungs every 6 hours as needed for shortness of breath / dyspnea or wheezing 1 Inhaler 1     aspirin 81 MG tablet Take 1 tablet (81 mg) by mouth daily 90 tablet 3     budesonide-formoterol (SYMBICORT) 160-4.5 MCG/ACT Inhaler Inhale 2 puffs into the lungs 2 times daily 3 Inhaler 1     Cranberry 500 MG CAPS        Krill Oil Omega-3 500 MG CAPS 2 daily       mirabegron (MYRBETRIQ) 25 MG 24 hr tablet Take 1 tablet (25 mg) by mouth daily 90 tablet 3     REBIF REBIDOSE 44 MCG/0.5ML SOAJ Inject 44 mcg Subcutaneous three times a week 6 mL 11     sertraline (ZOLOFT) 100 MG tablet TAKE ONE AND ONE-HALF TABLETS BY MOUTH DAILY 135 tablet 0     WOMENS DAILY MULTIVITAMIN OR TABS 1 TABLET DAILY 30 0       Physical Exam:    GENL: NAD.    ABD: Soft, non-tender, no masses.    EG: Poorly-estrogenized, no masses.    VAGINA: Poorly-estrogenized, no masses.    BN HYPERMOBILITY: Moderate.    CYSTOCELE: Grade 2.    APICAL PROLAPSE: Minimal.    RECTOCELE: Minimal.    BIMANUAL: No mass or tenderness.    Cysto:    (Informed consent obtained. Pause for cause  performed)   Sterile prep.    17 Fr scope inserted through urethra. Systematic examination w 70 degree lens.   PVR: Nil   MUCOSA: Normal without lesion; marked trabeculation throughout   ORIFICES: Normal location and morphology   CAPACITY: 300 cc; no pain with filling   Scope withdrawn without untoward effect.    Valsalva:   Mild hypermobility, marked leakage noted.    (Pt tolerated procedure without difficulty).      Results for orders placed or performed in visit on 02/14/23   UA reflex to Microscopic     Status: Abnormal   Result Value Ref Range    Color Urine Yellow Colorless, Straw, Light Yellow, Yellow    Appearance Urine Clear Clear    Glucose Urine Negative Negative mg/dL    Bilirubin Urine Negative Negative    Ketones Urine Negative Negative mg/dL    Specific Gravity Urine 1.020 1.003 - 1.035    Blood Urine Negative Negative    pH Urine 6.0 5.0 - 7.0    Protein Albumin Urine Negative Negative mg/dL    Urobilinogen Urine Normal Normal, 2.0 mg/dL    Nitrite Urine Negative Negative    Leukocyte Esterase Urine Small (A) Negative    RBC Urine 1 <=2 /HPF    WBC Urine 10 (H) <=5 /HPF    Squamous Epithelials Urine 1 <=1 /HPF    Mucus Urine Present (A) None Seen /LPF    Hyaline Casts Urine 3 (H) <=2 /LPF         IMP:  1. Mixed UI, urge > stress, MS, marked trabeculation  2. Atrophic vulvovaginitis      PLAN:  1. Discussed situation with patient in detail.  2. Consider topical HRT; discussed rationale, mech of action, application, etc  3. Consider trial Detrol LA4; pt elects trial  4. Might consider moderation/elimination of black tea; possibly replace with hot water  5. RTC 2 mos to review progress; may be looking at Botox (discussed Botox including risk in detail; pt asks good questions, expresses understanding)  6. Might consider sling at some point if DIPIKA remains bothersome  7. Set realistic expectations  8. Total time spent in reviewing patient records, discussing history, performing exam, discussing diagnosis,  outlining treatment plan and documentation: 60 minutes

## 2023-02-15 RX ORDER — INTERFERON BETA-1A 44 UG/.5ML
44 INJECTION, SOLUTION SUBCUTANEOUS
Qty: 6 ML | Refills: 11 | Status: SHIPPED | OUTPATIENT
Start: 2023-02-15

## 2023-02-15 NOTE — CONFIDENTIAL NOTE
New prescription for Rebif sent to [facility].     Maylin Bearden, RN Care Coordinator   Neurology/Neurosurgery/PM&R/ Pain Management

## 2023-02-15 NOTE — TELEPHONE ENCOUNTER
Received fax to [facility] from Madelyn with [facility], stating pt will need to fill with [facility] and new rx needed. Fax forwarded to [facility].    Brenda Mcarthur RN

## 2023-02-20 NOTE — TELEPHONE ENCOUNTER
[facility] Call Center    Phone Message    May a detailed message be left on voicemail: no     Reason for Call: Medication Question or concern regarding medication   Prescription Clarification  Name of Medication: Interferon Beta-1a (REBIF REBIDOSE) 44 MCG/0.5ML SOAJ  Prescribing Provider: Viraj Santoyo   Pharmacy: [facility]   What on the order needs clarification?     [facility] [employer] is calling in regards to the medication refill for Interferon Beta-1a (REBIF REBIDOSE) 44 MCG/0.5ML SOAJ. [employer] [facility] states there is no date or signature on the script and is needed to process. Please call [facility] regarding this at # [phone]          Action Taken: Message routed to:  [facility] neurology     Travel Screening: Not Applicable

## 2023-02-20 NOTE — TELEPHONE ENCOUNTER
Writer clarified with [facility] pharmacist and they did receive the electronic Rebif prescription that was sent on 2/15/23. No further action needed.    However, writer received my chart message from pt stating that Medicare was going to deny authorization for Rebif but this was not seen by [facility] pharmacist. No outstanding PA's needed and no denials on file. Writer will update patient regarding this information and she may reach out to [facility] for more questions.       Hedy Salinas, RNCC  Neurology/Neurosurgery

## 2023-02-23 ENCOUNTER — TELEPHONE (OUTPATIENT)
Dept: NEUROLOGY | Facility: CLINIC | Age: 68
End: 2023-02-23

## 2023-02-23 NOTE — LETTER
March 2, 2023      Reg: Cecile Mays  [address] [facility] [address]            To whom it may concern,         MsHarjit Mays is a patient of mine at the [facility]. This patient has been stable with no clinical or radiologic evidence of active inflammatory demyelination on Rebif (interferon beta-1a) for many years. Switching her to a different medication (interferon beta-1b) is not medically reasonable or appropriate. Although these products are similar, they are not bioequivalent, and there are no studies establishing that switching from one to the other is harmless. I strongly recommend continuation of Rebif therapy at this time and request for reconsideration of this denial. Thank you.         Sincerely,      Viraj Santoyo MD   of Neurology  [facility]

## 2023-02-23 NOTE — TELEPHONE ENCOUNTER
PRIOR AUTHORIZATION DENIED    Medication: Rebif Rebidose 44MCG/0.5mL auto-injectors (PA DENIED)    Denial Date: 1/19/2023    Denial Rational: Must try/fail Betaseron    Appeal Information:

## 2023-02-23 NOTE — TELEPHONE ENCOUNTER
Pt reports that she has been in touch with [facility] and aware PA has been denied. She has one remaining Rebif syringe/dose which will last her tomorrow. Pt has been in touch with [employer] and hopes to get another 1 month supply as she waits for Rebif to be approved.     She is requesting an appeal be made to avoid starting a different medication. Writer informed her that Betaseron has not been tried/failed so an appeal would be unlikely. However, pt would like Dr. Santoyo to attempt an appeal if possible. If this is not an option or is denied, then she will wait for provider's recommendation if Betaseron should be tried or if it would be best to trail off any DMTs. Routed to provider to advise.       Hedy HORN RN

## 2023-02-24 NOTE — TELEPHONE ENCOUNTER
[facility] Call Center    Phone Message    May a detailed message be left on voicemail: yes     Reason for Call: Other[facility]. He asks if the request can be changed to Betaseron.    Please reply to:    Luis Eduardo Solomon  Phone [phone]  Fax [phone]    Action Taken: Message routed to:  [facility]: Neurology    Travel Screening: Not Applicable

## 2023-02-27 NOTE — TELEPHONE ENCOUNTER
[facility] Call [facility]    Phone Message    May a detailed message be left on voicemail: yes     Reason for Call: Other: Shivani is calling from [employer] requesting an update on appeal. Please contact back to discuss.     Action Taken: Message routed to:  Other: [facility] Neurology    Travel Screening: Not Applicable

## 2023-03-01 ENCOUNTER — TELEPHONE (OUTPATIENT)
Dept: OPHTHALMOLOGY | Facility: CLINIC | Age: 68
End: 2023-03-01
Payer: MEDICARE

## 2023-03-01 NOTE — TELEPHONE ENCOUNTER
Called and spoke to Yue Turner she tried to make an appointment with Dr. Ho, but call center stated Dr. Ho is doing teaching and she has to schedule with Dr. Mao.     I rescheduled Stan appointment with Dr. Gifford for 3.22     Verified clinic location / wait time     Alyssa Agarwal Communication Facilitator on 3/1/2023 at 9:48 AM

## 2023-03-02 NOTE — TELEPHONE ENCOUNTER
Provider would recommend appeal for Rebif. Letter drafted and routed to provider to review prior to submission.       Viraj Santoyo MD  You 14 minutes ago (11:40 AM)     WS  Yes, this should be appealed. This patient has been stable with no clinical or radiologic evidence of active inflammatory demyelination on Rebif (interferon beta-1a) for many years. Switching her to a different medication (interferon beta-1b) is not medically reasonable or appropriate. Although these products are similar they are not bioequivalent, and there are no studies establishing that switching from one to the other is harmless.        You  Viraj Santoyo MD 2 hours ago (9:21 AM)     AY  Hi Dr. Santoyo,     See my note. Rebif has been denied and insurance requesting Betaseron be tried/failed. However, pt does not want to trial the Betaseron and would prefer an appeal if possible. She would like your input and recommendation at this time. Please advise. Thanks.     Hedy Salinas, RNCC  Neurology/Neurosurgery

## 2023-03-03 NOTE — TELEPHONE ENCOUNTER
Medication Appeal Initiation    We have initiated an appeal for the requested medication:  Medication: Rebif Rebidose 44MCG/0.5mL auto-injectors (APPEAL PENDING)  Appeal Start Date:  3/3/2023  Insurance Company: [employer] - Phone [phone] Fax [phone]  Comments:  Appeal submitted with most recent office visit from 10-3-22      Thank you,    Josefina Wick [facility]  Specialty Pharmacy Clinic Liaison - CardiologyNeurologyMultiple [address]  [address] [place] [address]  Ph: [phone] Fax: [phone]  [email or web address]

## 2023-03-03 NOTE — TELEPHONE ENCOUNTER
Pt updated that appeal letter has been generated and will be submitted to her insurance. She will wait to hear back about an update. Pt confirmed that she was able to receive a supply of Rebif with help from [employer] so she has a few weeks supply at this time.       Hedy Salinas, RNCC  Neurology/Neurosurgery

## 2023-03-06 ENCOUNTER — DOCUMENTATION ONLY (OUTPATIENT)
Dept: NEUROLOGY | Facility: CLINIC | Age: 68
End: 2023-03-06
Payer: MEDICARE

## 2023-03-06 NOTE — PROGRESS NOTES
Letter of denial for rebrif received and faxed over to Josefina Wick at [phone].   Andre Reyes EMT 03/06/2023 2:37PM

## 2023-03-13 NOTE — TELEPHONE ENCOUNTER
The appeal was approved and the patient is receiving Rebif through [employer]. Awaiting approval letter from insurance.     Thank you,    Josefina Wick [facility]  Specialty Pharmacy Clinic Liaison - CardiologyNeurologyMultiple Sclerosis  [facility] [address] [place] [address]  Ph: [phone] Fax: [phone]  [email or web address]

## 2023-03-13 NOTE — TELEPHONE ENCOUNTER
Pt informed that appeal has been approved but approval letter has yet to be received. Will continue to wait for this letter in the meantime.        Hedy Salinas, RNCC  Neurology/Neurosurgery

## 2023-03-17 DIAGNOSIS — F33.0 MAJOR DEPRESSIVE DISORDER, RECURRENT EPISODE, MILD (H): ICD-10-CM

## 2023-03-17 RX ORDER — SERTRALINE HYDROCHLORIDE 100 MG/1
TABLET, FILM COATED ORAL
Qty: 135 TABLET | Refills: 1 | Status: SHIPPED | OUTPATIENT
Start: 2023-03-17 | End: 2023-07-03

## 2023-03-17 NOTE — TELEPHONE ENCOUNTER
Prescription approved per [facility] Refill Protocol.  Joy Holder RN on 3/17/2023 at 1:33 PM

## 2023-03-22 ENCOUNTER — OFFICE VISIT (OUTPATIENT)
Dept: OPHTHALMOLOGY | Facility: CLINIC | Age: 68
End: 2023-03-22
Attending: OPHTHALMOLOGY
Payer: MEDICARE

## 2023-03-22 DIAGNOSIS — H53.2 DIPLOPIA: ICD-10-CM

## 2023-03-22 DIAGNOSIS — H04.123 DRY EYE SYNDROME OF BOTH EYES: Primary | ICD-10-CM

## 2023-03-22 DIAGNOSIS — H52.03 HYPEROPIA OF BOTH EYES: ICD-10-CM

## 2023-03-22 PROCEDURE — G0463 HOSPITAL OUTPT CLINIC VISIT: HCPCS | Mod: 25 | Performed by: OPHTHALMOLOGY

## 2023-03-22 PROCEDURE — 99207 OCT RETINA SPECTRALIS OU (BOTH EYE): CPT | Performed by: OPHTHALMOLOGY

## 2023-03-22 PROCEDURE — 92134 CPTRZ OPH DX IMG PST SGM RTA: CPT | Performed by: OPHTHALMOLOGY

## 2023-03-22 PROCEDURE — 68760 CLOSE TEAR DUCT OPENING: CPT | Mod: 50 | Performed by: OPHTHALMOLOGY

## 2023-03-22 ASSESSMENT — REFRACTION_MANIFEST
OS_ADD: +3.00
OS_AXIS: 110
OS_SPHERE: +0.25
OS_CYLINDER: +0.50
OD_ADD: +3.00
OD_CYLINDER: +0.25
OD_AXIS: 070
OD_SPHERE: +1.00

## 2023-03-22 ASSESSMENT — REFRACTION_WEARINGRX
OS_CYLINDER: +0.50
OD_CYLINDER: +0.25
OS_ADD: +3.00
OS_SPHERE: -0.25
OS_AXIS: 090
OD_SPHERE: +0.25
OD_ADD: +3.00
OD_AXIS: 120

## 2023-03-22 ASSESSMENT — CONF VISUAL FIELD
OD_INFERIOR_NASAL_RESTRICTION: 0
OD_NORMAL: 1
OD_SUPERIOR_NASAL_RESTRICTION: 0
OS_NORMAL: 1
OD_SUPERIOR_TEMPORAL_RESTRICTION: 0
OS_SUPERIOR_TEMPORAL_RESTRICTION: 0
OS_INFERIOR_NASAL_RESTRICTION: 0
OD_INFERIOR_TEMPORAL_RESTRICTION: 0
OS_INFERIOR_TEMPORAL_RESTRICTION: 0
OS_SUPERIOR_NASAL_RESTRICTION: 0

## 2023-03-22 ASSESSMENT — SLIT LAMP EXAM - LIDS
COMMENTS: NORMAL
COMMENTS: NORMAL

## 2023-03-22 ASSESSMENT — VISUAL ACUITY
CORRECTION_TYPE: GLASSES
OS_CC: 20/50
OD_CC: 20/50
METHOD: SNELLEN - LINEAR
OS_CC+: -1
OD_CC+: +2

## 2023-03-22 ASSESSMENT — EXTERNAL EXAM - LEFT EYE: OS_EXAM: NORMAL

## 2023-03-22 ASSESSMENT — EXTERNAL EXAM - RIGHT EYE: OD_EXAM: NORMAL

## 2023-03-22 ASSESSMENT — TONOMETRY
OS_IOP_MMHG: 14
OD_IOP_MMHG: 10
IOP_METHOD: ICARE

## 2023-03-22 NOTE — PROGRESS NOTES
CC: MARK follow-up     HPI:    Referred by Dr. Fortune for dry eye. H/o LASIK in 2005. States she's had issues with dry eye since LASIK. Feels LE >> RE dry. Has had punctal plug placed a couple of years ago in right lower lid. Feels the two eyes don't work well together. Denies diplopia currently, hasn't had for a while. Occasionally if it occurs, it is relieved by resting eyes. Had been on restasis for a while, feels it did not help.     Interval:  In both eyes. Characterized as chronic. Associated symptoms include eye pain, dryness, irritation, burning, and photophobia. Occurring constantly. It is worse throughout the day. Duration of 6 years. Context:  dry eyes. Treatments tried include artificial tears, warm compresses, allergy drops, oral allergy medications, antibiotic drops, and ointment. Response to treatment was mild improvement. Pain was noted as 2/10. Dry eyes since diag with MS 2016 Art tears 10+ times days Hot compresses every day BE    OphthoMeds:  PFATs prn uses about 4x/day both eyes (Refresh w/ Omega)  Warm compresses   Ocuvite wipes  Omega 3 supplement    POH:  S/p myopic LASIK BE 2005    PMH:  Multiple sclerosis    A&P  1 hyperopia  New prescription given today    2. Dry eye syndrome  -h/o LASIK   -failed Xiidra and Restasis  -denies dry mouth, joint pain (some back pain), or rashes.  , history of MS  3/22/23: dry surface today, worse despite AT. [place] better before when the plugs were present    Plan  -cont aggressive PFATs  -continue start WC  -no improvement with restasis or xiidra in past  -punctal plug present right eye (silicone) less symptomatic in this eye, will place silicone plug left eye 11/25/20 -collagen plug LLL 0.3mm x 2 8/16/21  - continue Omega 3 Krill Oil -- discussed 2000mg /day high EPA  -punctal cautery today 3/22/23      - failed scleral lenses  Future considerations: Serum tears.    3. Diplopia  -currently asymptomatic  -no motilty or alignment issues per patient    4. H/o  LASIK BE    5. Refractive Error  -new MRx given today with increased add in bifocal    RTC: 6 months, sooner prn     Attending Physician Attestation:  Complete documentation of historical and exam elements from today's encounter can be found in the full encounter summary report (not reduplicated in this progress note).  I personally obtained the chief complaint(s) and history of present illness.  I confirmed and edited as necessary the review of systems, past medical/surgical history, family history, social history, and examination findings as documented by others; and I examined the patient myself.  I personally reviewed the relevant tests, images, and reports as documented above.  I formulated and edited as necessary the assessment and plan and discussed the findings and management plan with the patient and family. I personally reviewed the ophthalmic test(s) associated with this encounter, agree with the interpretation(s) as documented by the resident/fellow, and have edited the corresponding report(s) as necessary. I was present for the entire procedure(s). - Kenyetta Gifford MD

## 2023-03-22 NOTE — NURSING NOTE
Chief Complaints and History of Present Illnesses   Patient presents with     Dry Eye(s) Both Eyes     Dry eyes since diag with MS 2016  Art tears 10+ times days  Hot compresses every day BE  Britni REESE 9:08 AM March 22, 2023        Chief Complaint(s) and History of Present Illness(es)     Dry Eye(s) Both Eyes            Laterality: both eyes    Characteristics: chronic    Associated symptoms: eye pain, dryness, irritation, burning and photophobia    Frequency: constantly    Timing: throughout the day    Duration: 6 years    Context: dry eyes    Treatments tried: artificial tears, warm compresses, allergy drops, oral allergy medications, antibiotic drops and ointment    Response to treatment: mild improvement    Pain scale: 2/10    Comments: Dry eyes since diag with MS 2016  Art tears 10+ times days  Hot compresses every day LALITA REESE 9:08 AM March 22, 2023

## 2023-04-03 ENCOUNTER — TELEPHONE (OUTPATIENT)
Dept: NEUROLOGY | Facility: CLINIC | Age: 68
End: 2023-04-03

## 2023-04-03 ENCOUNTER — DOCUMENTATION ONLY (OUTPATIENT)
Dept: NEUROLOGY | Facility: CLINIC | Age: 68
End: 2023-04-03
Payer: MEDICARE

## 2023-04-03 NOTE — TELEPHONE ENCOUNTER
Pt is working on getting Rebif prescription for free through help from [employer]. [facility] will need new order on file to dispense this and verbal order requested. Writer provided pharmacist with verbal order for Rebif.       Hedy Salinas, RNCC  Neurology/Neurosurgery

## 2023-04-03 NOTE — PROGRESS NOTES
Update RX fors received from [employer] for Rebif. Placed in Dr. Santoyo's folder for review and signature.  Andre Reyes EMT 04/03/2023 11:22AM

## 2023-04-03 NOTE — TELEPHONE ENCOUNTER
[facility] Call Center    Phone Message    May a detailed message be left on voicemail: yes     Reason for Call:  [facility] called to speak to care team, they are requesting a verbal order for Rebif. Please call   [phone] and select the option for   pharmacist   Action Taken: Message routed to:  [facility] ([facility]): martha neurologist    Travel Screening: Not Applicable

## 2023-04-05 ENCOUNTER — MYC MEDICAL ADVICE (OUTPATIENT)
Dept: OPHTHALMOLOGY | Facility: CLINIC | Age: 68
End: 2023-04-05
Payer: MEDICARE

## 2023-04-05 ENCOUNTER — TELEPHONE (OUTPATIENT)
Dept: OPHTHALMOLOGY | Facility: CLINIC | Age: 68
End: 2023-04-05
Payer: MEDICARE

## 2023-04-05 NOTE — TELEPHONE ENCOUNTER
Spoke with patient regarding her current issues with her glasses.  She said her old glasses were given to her by someone in [place], they did have prisms In them.  She jut got her new glasses from her recent visit with Dr. Gifford and she is unable to wear them (this Rx did not have prisms in them).  She does have a history dating back to 2019 of prism glasses.      We went ahead and scheduled her an appt to see me so we can dispense a new Rx with prisms.  I asked Yue to bring her old glasses from [place] along to this appt as well.    JAYASHREE Heller 4/5/2023 11:58 AM          [facility] Call Center    Phone Message    May a detailed message be left on voicemail: yes     Reason for Call: Other: Patient is returning Gaby's call and would like a call back at [phone]. Refer to [employer] message. Thank you.      Action Taken: Message routed to:  [facility] ([facility]): Eye    Travel Screening: Not Applicable

## 2023-04-05 NOTE — TELEPHONE ENCOUNTER
Left message for patient to return my call so we can discuss in details about her glasses and make her an appropriate appointment.    JAYASHREE Heller 4/5/2023 9:43 AM

## 2023-04-05 NOTE — TELEPHONE ENCOUNTER
[facility] Call [facility]    Phone Message    May a detailed message be left on voicemail: yes     Reason for Call: Other: Patient is returning Gaby's call and would like a call back at [phone]. Thank you.     Action Taken: Message routed to:  [facility] Eye    Travel Screening: Not Applicable

## 2023-04-05 NOTE — TELEPHONE ENCOUNTER
Spoke to pt at 0815    Pt has been wearing glasses with prism for years (mild prism per glasses Rx in 2019)    No prism in new Rx.    Pt also with right eye axis adjustment with better visual acuity on last refraction.    Patient not happy/not able to wear new glasses without the prism and updated Rx.      Will review scheduling orthoptic exam to confirm prisms/update Rx.    Will reach out to patient after review.    Paul Alexis RN 8:29 AM 04/05/23

## 2023-04-10 ENCOUNTER — MEDICAL CORRESPONDENCE (OUTPATIENT)
Dept: HEALTH INFORMATION MANAGEMENT | Facility: CLINIC | Age: 68
End: 2023-04-10
Payer: MEDICARE

## 2023-04-11 ENCOUNTER — OFFICE VISIT (OUTPATIENT)
Dept: UROLOGY | Facility: CLINIC | Age: 68
End: 2023-04-11
Payer: MEDICARE

## 2023-04-11 VITALS
HEIGHT: 66 IN | BODY MASS INDEX: 27.32 KG/M2 | DIASTOLIC BLOOD PRESSURE: 76 MMHG | SYSTOLIC BLOOD PRESSURE: 126 MMHG | WEIGHT: 170 LBS

## 2023-04-11 DIAGNOSIS — N39.41 URGE INCONTINENCE: Primary | ICD-10-CM

## 2023-04-11 LAB
ALBUMIN UR-MCNC: NEGATIVE MG/DL
APPEARANCE UR: ABNORMAL
BILIRUB UR QL STRIP: NEGATIVE
COLOR UR AUTO: YELLOW
GLUCOSE UR STRIP-MCNC: NEGATIVE MG/DL
HGB UR QL STRIP: NEGATIVE
KETONES UR STRIP-MCNC: NEGATIVE MG/DL
LEUKOCYTE ESTERASE UR QL STRIP: ABNORMAL
MUCOUS THREADS #/AREA URNS LPF: PRESENT /LPF
NITRATE UR QL: NEGATIVE
PH UR STRIP: 5 [PH] (ref 5–7)
RBC URINE: 0 /HPF
SP GR UR STRIP: 1.01 (ref 1–1.03)
SQUAMOUS EPITHELIAL: 3 /HPF
UROBILINOGEN UR STRIP-MCNC: NORMAL MG/DL
WBC URINE: 5 /HPF

## 2023-04-11 PROCEDURE — 99213 OFFICE O/P EST LOW 20 MIN: CPT | Performed by: UROLOGY

## 2023-04-11 PROCEDURE — 81001 URINALYSIS AUTO W/SCOPE: CPT | Performed by: UROLOGY

## 2023-04-11 ASSESSMENT — PAIN SCALES - GENERAL: PAINLEVEL: NO PAIN (0)

## 2023-04-11 NOTE — PROGRESS NOTES
Neurogenic bladder with mixed UI, MS, atrophic vaginitis      Compliant with Estrace and Detrol.    Has not noted any significant improvement since starting Detrol; same experience as with Myrbetriq.    Still with significant urge and urge incontinence, urge >> stress, very bothersome.    Denies dysuria, gross hematuria, daytime frequency. Noc x 1-2.    Wears one med pad per nite, two med pads per day.    Drinks 2 tea, 3-6 Eyak per day.      Current Outpatient Medications   Medication     albuterol (PROAIR HFA/PROVENTIL HFA/VENTOLIN HFA) 108 (90 Base) MCG/ACT inhaler     aspirin 81 MG tablet     budesonide-formoterol (SYMBICORT) 160-4.5 MCG/ACT Inhaler     Cranberry 500 MG CAPS     estradiol (ESTRACE) 0.1 MG/GM vaginal cream     Krill Oil Omega-3 500 MG CAPS     REBIF REBIDOSE 44 MCG/0.5ML SOAJ     sertraline (ZOLOFT) 100 MG tablet     tolterodine ER (DETROL LA) 4 MG 24 hr capsule     No current facility-administered medications for this visit.       IMP:  1. Neurogenic bladder, room for improvement  2. Atrophic vulvovaginitis, stable on Estrace      PLAN:  1. Discussed situation with patient in detail.  2. Continue Estrace  3. Consider Botox; again discussed rationale, mech of action, technical aspects, risk, complications, recovery, results, retention, need for repeat cath, etc; pt expresses understanding, elects to proceed  4. Total time spent in reviewing patient's previous records, discussion with patient and documentation: 20 minutes

## 2023-04-12 NOTE — PROGRESS NOTES
Rebif prescription form has been signed and faxed back at [phone]  Andre MCKEON 04/12/2023 10:06AM

## 2023-04-14 ENCOUNTER — OFFICE VISIT (OUTPATIENT)
Dept: OPHTHALMOLOGY | Facility: CLINIC | Age: 68
End: 2023-04-14
Attending: OPHTHALMOLOGY
Payer: MEDICARE

## 2023-04-14 DIAGNOSIS — H50.22 HYPERTROPIA OF LEFT EYE: ICD-10-CM

## 2023-04-14 DIAGNOSIS — H50.15 ALTERNATING EXOTROPIA: Primary | ICD-10-CM

## 2023-04-14 PROCEDURE — G0463 HOSPITAL OUTPT CLINIC VISIT: HCPCS

## 2023-04-14 ASSESSMENT — VISUAL ACUITY
OS_CC+: -1
OD_CC: 20/30
OD_CC+: -1
OS_CC: 20/30
CORRECTION_TYPE: GLASSES
METHOD: SNELLEN - LINEAR

## 2023-04-14 ASSESSMENT — REFRACTION_WEARINGRX
OD_ADD: +3.00
OS_CYLINDER: +0.50
OS_SPHERE: +0.25
OS_AXIS: 110
OS_ADD: +3.00
OD_CYLINDER: +0.25
OD_AXIS: 070
OD_SPHERE: +1.00

## 2023-04-14 ASSESSMENT — REFRACTION_FINALRX
OS_VPRISM: 1.0
OS_HPRISM: 1.0
OS_HBASE: IN

## 2023-04-14 NOTE — PROGRESS NOTES
Assessment & Plan    Cecile Mays is a 67 year old female with the following diagnoses:   1. Alternating exotropia - Both Eyes    2. 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  Fusing well with small amount of vertical and horizontal prism, 1.0 prism diopter base in and 1.0 prism diopter base down over the left eye.  I demoed what was in her original prism (2pd BI and 2pd BD for LHT) glasses but patient did not like this amount and preferred the lesser amount of the two.  I explained that we would keep the prisms on one side only as half increments of prisms are usually harder for technicians to detect on the lensometer.  She was aware this new prescription would have both horizontal and vertical prism over the left eye only.  Patient was also having some difficulty with reading at near, we increased her reading power to +3.25.  We also demoed this in the exam room and she felt more comfortable and saw better clarity with the increased add bifocal power.    Patient disposition:   Return if symptoms worsen or fail to improve.       JAYASHREE Heller 4/14/2023 1:55 PM

## 2023-04-17 ENCOUNTER — MEDICAL CORRESPONDENCE (OUTPATIENT)
Dept: HEALTH INFORMATION MANAGEMENT | Facility: CLINIC | Age: 68
End: 2023-04-17
Payer: MEDICARE

## 2023-04-24 DIAGNOSIS — N39.41 URGE INCONTINENCE: ICD-10-CM

## 2023-04-24 NOTE — TELEPHONE ENCOUNTER
Pending Prescriptions:                       Disp   Refills    tolterodine ER (DETROL LA) 4 MG 24 hr cap*60 cap*0            Sig: Take 1 capsule (4 mg) by mouth daily

## 2023-04-26 RX ORDER — TOLTERODINE 4 MG/1
4 CAPSULE, EXTENDED RELEASE ORAL DAILY
Qty: 90 CAPSULE | Refills: 2 | Status: SHIPPED | OUTPATIENT
Start: 2023-04-26 | End: 2024-07-26

## 2023-04-26 NOTE — TELEPHONE ENCOUNTER
Prescription approved per [facility] Refill Protocol.  Joy Holder RN on 4/26/2023 at 12:30 PM

## 2023-05-02 ENCOUNTER — HOSPITAL ENCOUNTER (OUTPATIENT)
Dept: MAMMOGRAPHY | Facility: CLINIC | Age: 68
Discharge: HOME | End: 2023-05-02
Attending: FAMILY MEDICINE | Admitting: FAMILY MEDICINE
Payer: MEDICARE

## 2023-05-02 ENCOUNTER — TELEPHONE (OUTPATIENT)
Dept: UROLOGY | Facility: CLINIC | Age: 68
End: 2023-05-02

## 2023-05-02 ENCOUNTER — OFFICE VISIT (OUTPATIENT)
Dept: UROLOGY | Facility: CLINIC | Age: 68
End: 2023-05-02
Payer: MEDICARE

## 2023-05-02 DIAGNOSIS — N39.41 URGE INCONTINENCE: Primary | ICD-10-CM

## 2023-05-02 DIAGNOSIS — Z12.31 VISIT FOR SCREENING MAMMOGRAM: ICD-10-CM

## 2023-05-02 DIAGNOSIS — N32.81 OAB (OVERACTIVE BLADDER): Primary | ICD-10-CM

## 2023-05-02 PROCEDURE — 52287 CYSTOSCOPY CHEMODENERVATION: CPT | Performed by: UROLOGY

## 2023-05-02 PROCEDURE — 77067 SCR MAMMO BI INCL CAD: CPT

## 2023-05-02 RX ORDER — LEVOFLOXACIN 250 MG/1
250 TABLET, FILM COATED ORAL DAILY
Qty: 3 TABLET | Refills: 0 | Status: SHIPPED | OUTPATIENT
Start: 2023-05-02 | End: 2023-05-16

## 2023-05-02 NOTE — PROGRESS NOTES
F/u mixed neurogenic bladder with mixed UI, MS, atrophic vaginitis (Estrace), here for Botox #1    Again discussed the process in detail. Informed consent obtained.     Botox A 100 units in 6 ml saline, injected into single site in posterior wall. Excellent bleb and hemostasis.     Compliant on Estrace      IMP:  1. Botox #1 for neurogenic bladder  2. Atrophic vaginitis, stable on Estrace      PLAN:  1. Discussed situation with patient in detail.  2. Precautions given in detail  3. Levoquin x 1 (script sent for 3)  4. RTC 2 wks to review progress; pt will bring pix of her bike ride event  5. Continue Estrace

## 2023-05-02 NOTE — PROGRESS NOTES
The following medication was given:     MEDICATION: Botox  ROUTE: IM  SITE: Bladder  DOSE: 100U  LOT #: [id number]  :  Fabiano  EXPIRATION DATE:  09/2025  NDC#: 7843-2711-76      Clinic Administered Medication Documentation        Patient was given Botox. Prior to medication administration, verified patient's identity using patient s name and date of birth. Please see MAR and medication order for additional information. Patient instructed to remain in clinic for 15 minutes and report any adverse reaction to staff immediately.    Vial/Syringe: Single dose vial. Was entire vial of medication used? Yes

## 2023-05-16 ENCOUNTER — OFFICE VISIT (OUTPATIENT)
Dept: UROLOGY | Facility: CLINIC | Age: 68
End: 2023-05-16
Payer: MEDICARE

## 2023-05-16 VITALS
OXYGEN SATURATION: 97 % | BODY MASS INDEX: 28.25 KG/M2 | WEIGHT: 175 LBS | HEART RATE: 82 BPM | DIASTOLIC BLOOD PRESSURE: 82 MMHG | SYSTOLIC BLOOD PRESSURE: 122 MMHG | RESPIRATION RATE: 14 BRPM

## 2023-05-16 DIAGNOSIS — N32.81 OAB (OVERACTIVE BLADDER): Primary | ICD-10-CM

## 2023-05-16 LAB
ALBUMIN UR-MCNC: NEGATIVE MG/DL
APPEARANCE UR: CLEAR
BILIRUB UR QL STRIP: NEGATIVE
COLOR UR AUTO: YELLOW
GLUCOSE UR STRIP-MCNC: NEGATIVE MG/DL
HGB UR QL STRIP: NEGATIVE
KETONES UR STRIP-MCNC: NEGATIVE MG/DL
LEUKOCYTE ESTERASE UR QL STRIP: NEGATIVE
NITRATE UR QL: NEGATIVE
PH UR STRIP: 6.5 [PH] (ref 5–7)
SP GR UR STRIP: 1.01 (ref 1–1.03)
UROBILINOGEN UR STRIP-MCNC: NORMAL MG/DL

## 2023-05-16 PROCEDURE — 81003 URINALYSIS AUTO W/O SCOPE: CPT | Performed by: UROLOGY

## 2023-05-16 PROCEDURE — 99213 OFFICE O/P EST LOW 20 MIN: CPT | Performed by: UROLOGY

## 2023-05-16 ASSESSMENT — PAIN SCALES - GENERAL: PAINLEVEL: NO PAIN (0)

## 2023-05-16 NOTE — PROGRESS NOTES
"F/u mixed neurogenic bladder with mixed UI, MS, atrophic vaginitis (Estrace), s/p Botox #1 on 5/2/23      \"I'm a lot better.\"    Pt reports only a single episode of urge incontinence since her rx.    Denies dysuria, gross hematuria, frequency. Nocturia x 1.     Wears light pad during the day; dry at day's end. No pad at nite. No leak en route to the restroom.    Very pleased.        Current Outpatient Medications   Medication     albuterol (PROAIR HFA/PROVENTIL HFA/VENTOLIN HFA) 108 (90 Base) MCG/ACT inhaler     aspirin 81 MG tablet     budesonide-formoterol (SYMBICORT) 160-4.5 MCG/ACT Inhaler     Cranberry 500 MG CAPS     estradiol (ESTRACE) 0.1 MG/GM vaginal cream     Krill Oil Omega-3 500 MG CAPS     REBIF REBIDOSE 44 MCG/0.5ML SOAJ     sertraline (ZOLOFT) 100 MG tablet     tolterodine ER (DETROL LA) 4 MG 24 hr capsule     No current facility-administered medications for this visit.           Results for orders placed or performed in visit on 05/16/23   UA Macroscopic with reflex to Microscopic and Culture     Status: Normal    Specimen: Urine, Clean Catch   Result Value Ref Range    Color Urine Yellow Colorless, Straw, Light Yellow, Yellow    Appearance Urine Clear Clear    Glucose Urine Negative Negative mg/dL    Bilirubin Urine Negative Negative    Ketones Urine Negative Negative mg/dL    Specific Gravity Urine 1.015 1.003 - 1.035    Blood Urine Negative Negative    pH Urine 6.5 5.0 - 7.0    Protein Albumin Urine Negative Negative mg/dL    Urobilinogen Urine Normal Normal, 2.0 mg/dL    Nitrite Urine Negative Negative    Leukocyte Esterase Urine Negative Negative    Narrative    Microscopic not indicated       PVR: 345 cc          IMP:  1. Neurogenic bladder with mixed incontinence, MS; doing very well with Botox  2. Atrophic vaginitis, on Estrace      PLAN:  1. Discussed situation with patient in detail.  2. Continue Estrace  3. RTC 6 mos for next Botox (or sooner prn)  4. Total time spent in reviewing " patient's previous records, discussion with patient and documentation: 20 minutes

## 2023-05-16 NOTE — NURSING NOTE
Chief Complaint   Patient presents with     Follow Up     2 week Botox recheck      Bladder Scan was:345

## 2023-09-28 ENCOUNTER — MYC MEDICAL ADVICE (OUTPATIENT)
Dept: FAMILY MEDICINE | Facility: CLINIC | Age: 68
End: 2023-09-28
Payer: MEDICARE

## 2023-10-04 ENCOUNTER — OFFICE VISIT (OUTPATIENT)
Dept: FAMILY MEDICINE | Facility: CLINIC | Age: 68
End: 2023-10-04
Payer: MEDICARE

## 2023-10-04 VITALS
RESPIRATION RATE: 14 BRPM | WEIGHT: 170 LBS | HEIGHT: 66 IN | OXYGEN SATURATION: 96 % | SYSTOLIC BLOOD PRESSURE: 120 MMHG | BODY MASS INDEX: 27.32 KG/M2 | HEART RATE: 79 BPM | DIASTOLIC BLOOD PRESSURE: 70 MMHG | TEMPERATURE: 98.4 F

## 2023-10-04 DIAGNOSIS — J30.1 SEASONAL ALLERGIC RHINITIS DUE TO POLLEN: ICD-10-CM

## 2023-10-04 DIAGNOSIS — F33.0 MAJOR DEPRESSIVE DISORDER, RECURRENT EPISODE, MILD (H): ICD-10-CM

## 2023-10-04 DIAGNOSIS — J98.01 ACUTE BRONCHOSPASM: ICD-10-CM

## 2023-10-04 DIAGNOSIS — J45.901 MODERATE ASTHMA WITH EXACERBATION, UNSPECIFIED WHETHER PERSISTENT: Primary | ICD-10-CM

## 2023-10-04 PROCEDURE — 99214 OFFICE O/P EST MOD 30 MIN: CPT | Performed by: FAMILY MEDICINE

## 2023-10-04 RX ORDER — PREDNISONE 20 MG/1
40 TABLET ORAL DAILY
Qty: 10 TABLET | Refills: 0 | Status: SHIPPED | OUTPATIENT
Start: 2023-10-04 | End: 2023-10-09

## 2023-10-04 RX ORDER — MONTELUKAST SODIUM 10 MG/1
10 TABLET ORAL AT BEDTIME
Qty: 60 TABLET | Refills: 1 | Status: SHIPPED | OUTPATIENT
Start: 2023-10-04 | End: 2024-05-15

## 2023-10-04 ASSESSMENT — PAIN SCALES - GENERAL: PAINLEVEL: NO PAIN (0)

## 2023-10-04 ASSESSMENT — ANXIETY QUESTIONNAIRES
1. FEELING NERVOUS, ANXIOUS, OR ON EDGE: NOT AT ALL
4. TROUBLE RELAXING: NOT AT ALL
IF YOU CHECKED OFF ANY PROBLEMS ON THIS QUESTIONNAIRE, HOW DIFFICULT HAVE THESE PROBLEMS MADE IT FOR YOU TO DO YOUR WORK, TAKE CARE OF THINGS AT HOME, OR GET ALONG WITH OTHER PEOPLE: NOT DIFFICULT AT ALL
GAD7 TOTAL SCORE: 0
5. BEING SO RESTLESS THAT IT IS HARD TO SIT STILL: NOT AT ALL
3. WORRYING TOO MUCH ABOUT DIFFERENT THINGS: NOT AT ALL
GAD7 TOTAL SCORE: 0
6. BECOMING EASILY ANNOYED OR IRRITABLE: NOT AT ALL
2. NOT BEING ABLE TO STOP OR CONTROL WORRYING: NOT AT ALL
7. FEELING AFRAID AS IF SOMETHING AWFUL MIGHT HAPPEN: NOT AT ALL

## 2023-10-04 ASSESSMENT — PATIENT HEALTH QUESTIONNAIRE - PHQ9
SUM OF ALL RESPONSES TO PHQ QUESTIONS 1-9: 7
SUM OF ALL RESPONSES TO PHQ QUESTIONS 1-9: 7

## 2023-10-04 NOTE — PROGRESS NOTES
"  Assessment & Plan       ICD-10-CM    1. Moderate asthma with exacerbation, unspecified whether persistent  J45.901 predniSONE (DELTASONE) 20 MG tablet      2. Acute bronchospasm  J98.01       3. Seasonal allergic rhinitis due to pollen  J30.1 montelukast (SINGULAIR) 10 MG tablet      4. Major depressive disorder, recurrent episode, mild (H24)  F33.0            Treated with pneumonia with antibiotics.  Continues to feel short of breath.  Peak flow is 250 in clinic today.  Likely she is got an asthma exacerbation triggered by this pneumonia.  Start prednisone.  Add montelukast for her seasonal allergy symptoms.  Seek further care if worsening.  Oxygenation adequate today.  No evidence of respiratory distress.  See her back soon for recheck    Return in about 2 weeks (around 10/18/2023).    Valdez Hurd MD  [facility]      Earl Turner is a 67 year old, presenting for the following health issues:  RECHECK        10/4/2023     3:09 PM   Additional Questions   Roomed by Osbaldo Moctezuma [facility]       History of Present Illness       Reason for visit:  Pneumonia   She is taking medications regularly.   2 wks ago diagnosed with cap, + RLL cxr, not available to review, put on azithro  Cough is better, physically tired  No appetitie, down 8 lbs  Sleep lots  On azithro  + Seasonal allergies too  Felt hot/chilled last night  Still SOB, has to go slow with level ground ambulation          Review of Systems   Constitutional, HEENT, cardiovascular, pulmonary, gi and gu systems are negative, except as otherwise noted.      Objective    /70 (BP Location: Right arm, Patient Position: Chair, Cuff Size: Adult Large)   Pulse 79   Temp 98.4  F (36.9  C) (Tympanic)   Resp 14   Ht 1.676 m (5' 6\")   Wt 77.1 kg (170 lb)   LMP 02/05/2008   SpO2 96%   BMI 27.44 kg/m    Body mass index is 27.44 kg/m .  Physical Exam   GENERAL: healthy, alert and no distress  NECK: no adenopathy, no asymmetry, masses, " or scars and thyroid normal to palpation  RESP: lungs clear to auscultation - no rales, rhonchi or wheezes  CV: regular rate and rhythm, normal S1 S2, no S3 or S4, no murmur, click or rub, no peripheral edema and peripheral pulses strong  ABDOMEN: soft, nontender, no hepatosplenomegaly, no masses and bowel sounds normal  MS: no gross musculoskeletal defects noted, no edema    No results found for any visits on 10/04/23.

## 2023-10-16 ENCOUNTER — OFFICE VISIT (OUTPATIENT)
Dept: NEUROLOGY | Facility: CLINIC | Age: 68
End: 2023-10-16
Payer: MEDICARE

## 2023-10-16 VITALS
DIASTOLIC BLOOD PRESSURE: 80 MMHG | WEIGHT: 170 LBS | SYSTOLIC BLOOD PRESSURE: 128 MMHG | BODY MASS INDEX: 27.44 KG/M2 | HEART RATE: 70 BPM

## 2023-10-16 DIAGNOSIS — R39.15 URINARY URGENCY: ICD-10-CM

## 2023-10-16 DIAGNOSIS — R26.9 GAIT DISTURBANCE: ICD-10-CM

## 2023-10-16 DIAGNOSIS — G35 MS (MULTIPLE SCLEROSIS) (H): Primary | ICD-10-CM

## 2023-10-16 DIAGNOSIS — R74.8 ELEVATED ALKALINE PHOSPHATASE LEVEL: ICD-10-CM

## 2023-10-16 LAB
ALBUMIN SERPL BCG-MCNC: 4.1 G/DL (ref 3.5–5.2)
ALP SERPL-CCNC: 139 U/L (ref 35–104)
ALT SERPL W P-5'-P-CCNC: 11 U/L (ref 0–50)
AST SERPL W P-5'-P-CCNC: 19 U/L (ref 0–45)
BASO+EOS+MONOS # BLD AUTO: NORMAL 10*3/UL
BASO+EOS+MONOS NFR BLD AUTO: NORMAL %
BASOPHILS # BLD AUTO: 0.1 10E3/UL (ref 0–0.2)
BASOPHILS NFR BLD AUTO: 1 %
BILIRUB DIRECT SERPL-MCNC: <0.2 MG/DL (ref 0–0.3)
BILIRUB SERPL-MCNC: 0.3 MG/DL
EOSINOPHIL # BLD AUTO: 0.2 10E3/UL (ref 0–0.7)
EOSINOPHIL NFR BLD AUTO: 2 %
ERYTHROCYTE [DISTWIDTH] IN BLOOD BY AUTOMATED COUNT: 13.7 % (ref 10–15)
HCT VFR BLD AUTO: 41.8 % (ref 35–47)
HGB BLD-MCNC: 13.4 G/DL (ref 11.7–15.7)
IMM GRANULOCYTES # BLD: 0 10E3/UL
IMM GRANULOCYTES NFR BLD: 0 %
LYMPHOCYTES # BLD AUTO: 2.7 10E3/UL (ref 0.8–5.3)
LYMPHOCYTES NFR BLD AUTO: 37 %
MCH RBC QN AUTO: 29.7 PG (ref 26.5–33)
MCHC RBC AUTO-ENTMCNC: 32.1 G/DL (ref 31.5–36.5)
MCV RBC AUTO: 93 FL (ref 78–100)
MONOCYTES # BLD AUTO: 0.5 10E3/UL (ref 0–1.3)
MONOCYTES NFR BLD AUTO: 7 %
NEUTROPHILS # BLD AUTO: 3.8 10E3/UL (ref 1.6–8.3)
NEUTROPHILS NFR BLD AUTO: 53 %
NRBC # BLD AUTO: 0 10E3/UL
NRBC BLD AUTO-RTO: 0 /100
PLATELET # BLD AUTO: 221 10E3/UL (ref 150–450)
PROT SERPL-MCNC: 7 G/DL (ref 6.4–8.3)
RBC # BLD AUTO: 4.51 10E6/UL (ref 3.8–5.2)
WBC # BLD AUTO: 7.3 10E3/UL (ref 4–11)

## 2023-10-16 PROCEDURE — 84080 ASSAY ALKALINE PHOSPHATASES: CPT | Mod: 90 | Performed by: PSYCHIATRY & NEUROLOGY

## 2023-10-16 PROCEDURE — 85025 COMPLETE CBC W/AUTO DIFF WBC: CPT | Performed by: PSYCHIATRY & NEUROLOGY

## 2023-10-16 PROCEDURE — 36415 COLL VENOUS BLD VENIPUNCTURE: CPT | Performed by: PSYCHIATRY & NEUROLOGY

## 2023-10-16 PROCEDURE — 80076 HEPATIC FUNCTION PANEL: CPT | Performed by: PSYCHIATRY & NEUROLOGY

## 2023-10-16 PROCEDURE — 99000 SPECIMEN HANDLING OFFICE-LAB: CPT | Performed by: PSYCHIATRY & NEUROLOGY

## 2023-10-16 NOTE — LETTER
10/16/2023         RE: Cecile Mays  [address] [facility] [address]        Dear Colleague,    Thank you for referring your patient, Cecile Mays, to the [facility] [place]. Please see a copy of my visit note below.    No notes on file    Again, thank you for allowing me to participate in the care of your patient.        Sincerely,        Viraj Santoyo MD

## 2023-10-18 ENCOUNTER — OFFICE VISIT (OUTPATIENT)
Dept: FAMILY MEDICINE | Facility: CLINIC | Age: 68
End: 2023-10-18
Payer: MEDICARE

## 2023-10-18 VITALS
RESPIRATION RATE: 12 BRPM | TEMPERATURE: 97.5 F | HEART RATE: 82 BPM | HEIGHT: 66 IN | BODY MASS INDEX: 27.74 KG/M2 | DIASTOLIC BLOOD PRESSURE: 80 MMHG | SYSTOLIC BLOOD PRESSURE: 130 MMHG | OXYGEN SATURATION: 98 % | WEIGHT: 172.6 LBS

## 2023-10-18 DIAGNOSIS — J45.901 MODERATE ASTHMA WITH EXACERBATION, UNSPECIFIED WHETHER PERSISTENT: Primary | ICD-10-CM

## 2023-10-18 DIAGNOSIS — R74.8 ELEVATED ALKALINE PHOSPHATASE LEVEL: ICD-10-CM

## 2023-10-18 DIAGNOSIS — R06.2 WHEEZING: ICD-10-CM

## 2023-10-18 DIAGNOSIS — Z29.11 NEED FOR VACCINATION AGAINST RESPIRATORY SYNCYTIAL VIRUS: ICD-10-CM

## 2023-10-18 PROCEDURE — 99213 OFFICE O/P EST LOW 20 MIN: CPT | Performed by: FAMILY MEDICINE

## 2023-10-18 RX ORDER — RESPIRATORY SYNCYTIAL VIRUS VACCINE 120MCG/0.5
0.5 KIT INTRAMUSCULAR ONCE
Qty: 1 EACH | Refills: 0 | Status: CANCELLED | OUTPATIENT
Start: 2023-10-18 | End: 2023-10-18

## 2023-10-18 RX ORDER — BUDESONIDE AND FORMOTEROL FUMARATE DIHYDRATE 160; 4.5 UG/1; UG/1
2 AEROSOL RESPIRATORY (INHALATION) 2 TIMES DAILY
Qty: 10.2 G | Refills: 1 | Status: SHIPPED | OUTPATIENT
Start: 2023-10-18 | End: 2024-03-15

## 2023-10-18 ASSESSMENT — PAIN SCALES - GENERAL: PAINLEVEL: NO PAIN (0)

## 2023-10-18 NOTE — PROGRESS NOTES
Assessment & Plan       ICD-10-CM    1. Moderate asthma with exacerbation, unspecified whether persistent  J45.901       2. Need for vaccination against respiratory syncytial virus  Z29.11 budesonide-formoterol (SYMBICORT) 160-4.5 MCG/ACT Inhaler      3. Elevated alkaline phosphatase level  R74.8 US Abdomen Limited     Bone specific alk phosphatase      4. Wheezing  R06.2          She returns feeling 90% improved after the addition of prednisone to her antibiotic regimen.  She is cleared the pneumonia, now has cleared her exacerbation.  She will continue to watch her peak flows and continue with her inhalers.  I note an elevated alkaline phosphatase. Past few months. Check labs and US.     No follow-ups on file.    Valdez Hurd MD  [facility]      Earl Turner is a 67 year old, presenting for the following health issues:  RECHECK        10/18/2023    10:17 AM   Additional Questions   Roomed by Osbaldo Moctezuma CMA       History of Present Illness     Asthma:  She presents for follow up of asthma.  She has some cough, some wheezing, and some shortness of breath.  She is using a relief medication 2-3 times per day. She does not miss any doses of her controller medication throughout the week. Patient is aware of the following triggers: same as previous visit. The patient has not had a visit to the Emergency Room, Urgent Care or Hospital due to asthma since the last clinic visit. She has been to the Emergency Room or Urgent Care 0 times.She has had a Hospitalization 0 times.    Reason for visit:  Pneumonia follow up    She eats 2-3 servings of fruits and vegetables daily.She consumes 2 sweetened beverage(s) daily.She exercises with enough effort to increase her heart rate 9 or less minutes per day.  She exercises with enough effort to increase her heart rate 3 or less days per week.   She is taking medications regularly.                 Review of Systems         Objective    /80  "(BP Location: Right arm, Patient Position: Chair, Cuff Size: Adult Large)   Pulse 82   Temp 97.5  F (36.4  C) (Temporal)   Resp 12   Ht 1.676 m (5' 6\")   Wt 78.3 kg (172 lb 9.6 oz)   LMP 02/05/2008   SpO2 98%   BMI 27.86 kg/m    Body mass index is 27.86 kg/m .  Physical Exam   GENERAL: healthy, alert and no distress  NECK: no adenopathy, no asymmetry, masses, or scars and thyroid normal to palpation  RESP: lungs clear to auscultation - no rales, rhonchi or wheezes  CV: regular rate and rhythm, normal S1 S2, no S3 or S4, no murmur, click or rub, no peripheral edema and peripheral pulses strong  MS: no gross musculoskeletal defects noted, no edema                      "

## 2023-10-19 LAB — ALP BONE SERPL-MCNC: 27.8 UG/L

## 2023-10-23 ENCOUNTER — TELEPHONE (OUTPATIENT)
Dept: FAMILY MEDICINE | Facility: CLINIC | Age: 68
End: 2023-10-23
Payer: MEDICARE

## 2023-10-23 ENCOUNTER — HOSPITAL ENCOUNTER (OUTPATIENT)
Dept: ULTRASOUND IMAGING | Facility: CLINIC | Age: 68
Discharge: HOME | End: 2023-10-23
Attending: FAMILY MEDICINE | Admitting: FAMILY MEDICINE
Payer: MEDICARE

## 2023-10-23 DIAGNOSIS — R74.8 ELEVATED ALKALINE PHOSPHATASE LEVEL: ICD-10-CM

## 2023-10-23 PROCEDURE — 76705 ECHO EXAM OF ABDOMEN: CPT

## 2023-10-23 NOTE — TELEPHONE ENCOUNTER
Prior Authorization Retail Medication Request    Medication/Dose: budesonide-formoterol (SYMBICORT) 160-4.5 MCG/ACT Inhaler  ICD code (if different than what is on RX):    Acute bronchospasm [J98.01]        Previously Tried and Failed:  na  Rationale:  na    Insurance Name:  MEDICARE   Insurance ID:  [id number]   [facility]   [id number]     Pharmacy Information (if different than what is on RX)  Name:  [facility]  Phone:  [phone]

## 2023-10-25 ENCOUNTER — MYC MEDICAL ADVICE (OUTPATIENT)
Dept: FAMILY MEDICINE | Facility: CLINIC | Age: 68
End: 2023-10-25
Payer: MEDICARE

## 2023-10-25 DIAGNOSIS — K80.20 GALLSTONES: Primary | ICD-10-CM

## 2023-10-25 NOTE — TELEPHONE ENCOUNTER
Central Prior Authorization Team  Phone: [phone]    PA Initiation    Medication: BUDESONIDE-FORMOTEROL FUMARATE 160-4.5 MCG/ACT IN AERO  Insurance Company: [employer] - Phone [phone] Fax [phone]  Pharmacy Filling the Rx:    Filling Pharmacy Phone:    Filling Pharmacy Fax:    Start Date: 10/25/2023

## 2023-10-26 NOTE — TELEPHONE ENCOUNTER
Result note from 10/25/23:  Hi - gall stones present. This would cause your 'alk phos' to be elevated. You could meet with a surgeon to discuss getting your gall bladder out, those stones could be an issue for you.        Valdez Hurd MD    - pended referral to general surgery

## 2023-10-26 NOTE — TELEPHONE ENCOUNTER
Central Prior Authorization Team  Phone: [phone]    PRIOR AUTHORIZATION DENIED    Medication: BUDESONIDE-FORMOTEROL FUMARATE 160-4.5 MCG/ACT IN AERO  Insurance Company: [employer] - Phone [phone] Fax [phone]  Denial Date: 10/26/2023  Denial Rational:         Appeal Information:

## 2023-10-26 NOTE — TELEPHONE ENCOUNTER
Please let her know it was denied.  She should call her insurance to see if there is an equivalent medication I can send for her that is on formulary

## 2023-10-30 ENCOUNTER — OFFICE VISIT (OUTPATIENT)
Dept: SURGERY | Facility: CLINIC | Age: 68
End: 2023-10-30
Payer: MEDICARE

## 2023-10-30 ENCOUNTER — TELEPHONE (OUTPATIENT)
Dept: UROLOGY | Facility: CLINIC | Age: 68
End: 2023-10-30

## 2023-10-30 VITALS
WEIGHT: 175 LBS | DIASTOLIC BLOOD PRESSURE: 82 MMHG | BODY MASS INDEX: 28.12 KG/M2 | SYSTOLIC BLOOD PRESSURE: 122 MMHG | HEIGHT: 66 IN | TEMPERATURE: 96.1 F

## 2023-10-30 DIAGNOSIS — K80.20 ASYMPTOMATIC CHOLELITHIASIS: Primary | ICD-10-CM

## 2023-10-30 DIAGNOSIS — K80.20 GALLSTONES: ICD-10-CM

## 2023-10-30 DIAGNOSIS — N32.81 OAB (OVERACTIVE BLADDER): Primary | ICD-10-CM

## 2023-10-30 DIAGNOSIS — R79.89 ELEVATED LFTS: ICD-10-CM

## 2023-10-30 DIAGNOSIS — G35 MULTIPLE SCLEROSIS (H): ICD-10-CM

## 2023-10-30 PROCEDURE — 99203 OFFICE O/P NEW LOW 30 MIN: CPT | Performed by: SPECIALIST

## 2023-10-30 ASSESSMENT — PAIN SCALES - GENERAL: PAINLEVEL: NO PAIN (0)

## 2023-10-30 NOTE — TELEPHONE ENCOUNTER
Surgery Pre-Certification    Medical Record Number: [id number]  Cecile Mays  YOB: 1955   Phone: [phone] (home)   Primary Provider: Valdez Hurd    Diagnosis:  OAB    Surgeon: Nato Ibrahim MD  Surgical Procedure: Cysto botox  BMI:    ICD-10 Coded: N32.81  Hospital: [facility]  In-Patient/ Out-Patient status: Outpatient  Length of surgery: 30 minutes  Anesthesia: n/a  Implanted Cardiac Device: No    Date of Surgery:  11/21/2023  When post-op appointment needed:  2 weeks post procedure  Special Requests/ Equipment:: n/a  CPM Needed: n/a  Requestor:  Floresita Dean MA

## 2023-10-30 NOTE — LETTER
10/30/2023         RE: Cecile Mays  [address]  [facility] [address]        Dear Colleague,    Thank you for referring your patient, Cecile Mays, to the [facility]. Please see a copy of my visit note below.    Consult request by Dr. Hurd      Reason for consultation: Gallstones    HPI:  Patient is a 67-year-old white female with a history of MS.  She is currently on Rebif for her MS.  On her most recent LFTs she was found to have a mildly elevated alk phos.  Review of her chart reveals she has had intermittent elevated LFTs over the past 5 years which I assume are attributed to her MS medication that she has been on since 2018.  Her PCP ordered a right upper quadrant ultrasound that revealed gallstones for which she is now referred.  She denies any abdominal pain, nausea, vomiting, fever, chills or any fatty food intolerance.  She does avoid fatty foods due to her 's cardiac disease.  Other than her MS she is completely asymptomatic.  She now presents to me for evaluation of her gallstones.    Past Medical History:   Diagnosis Date     Allergy      Depressive disorder      High cholesterol      Past Surgical History:   Procedure Laterality Date     COLONOSCOPY  1/24/2014    Procedure: COLONOSCOPY;  Colonoscopy;  Surgeon: Jose Martin Dye MD;  Location: [facility] LIGATE FALLOPIAN TUBE  1970's     Current Outpatient Medications   Medication     albuterol (PROAIR HFA/PROVENTIL HFA/VENTOLIN HFA) 108 (90 Base) MCG/ACT inhaler     aspirin 81 MG tablet     budesonide-formoterol (SYMBICORT) 160-4.5 MCG/ACT Inhaler     Cranberry 500 MG CAPS     estradiol (ESTRACE) 0.1 MG/GM vaginal cream     Krill Oil Omega-3 500 MG CAPS     montelukast (SINGULAIR) 10 MG tablet     REBIF REBIDOSE 44 MCG/0.5ML SOAJ     sertraline (ZOLOFT) 100 MG tablet     tolterodine ER (DETROL LA) 4 MG 24 hr capsule     Current Facility-Administered Medications    Medication     [START ON 11/13/2023] botulinum toxin type A (BOTOX) 100 units injection 100 Units        Allergies   Allergen Reactions     No Known Drug Allergy      Social History     Socioeconomic History     Marital status:      Spouse name: Luis     Number of children: 0     Years of education: 12     Highest education level: Not on file   Occupational History     Occupation:      Employer: [employer]   Tobacco Use     Smoking status: Never     Smokeless tobacco: Never   Vaping Use     Vaping Use: Never used   Substance and Sexual Activity     Alcohol use: No     Alcohol/week: 0.0 standard drinks of alcohol     Drug use: No     Sexual activity: Yes     Partners: Male     Birth control/protection: Surgical, Female Surgical     Comment: Hx: Tubal Ligation   Other Topics Concern      Service No     Blood Transfusions No     Caffeine Concern Yes     Comment: coffee/tea: 2c/d     Occupational Exposure No     Hobby Hazards No     Sleep Concern No     Stress Concern No     Weight Concern No     Special Diet Yes     Comment: Eat  Healthy     Back Care Yes     Comment: Hx: chiropractor and PT     Exercise Yes     Comment: Yoga and swimming 2/wk     Bike Helmet No     Comment: N/A     Seat Belt Yes     Self-Exams Yes     Comment: Monthly     Parent/sibling w/ CABG, MI or angioplasty before 65F 55M? No   Social History Narrative     Not on file     Social Determinants of Health     Financial Resource Strain: Low Risk  (10/4/2023)    Financial Resource Strain      Within the past 12 months, have you or your family members you live with been unable to get utilities (heat, electricity) when it was really needed?: No   Food Insecurity: Low Risk  (10/4/2023)    Food Insecurity      Within the past 12 months, did you worry that your food would run out before you got money to buy more?: No      Within the past 12 months, did the food you bought just not last and you didn t have money to  get more?: No   Transportation Needs: Low Risk  (10/4/2023)    Transportation Needs      Within the past 12 months, has lack of transportation kept you from medical appointments, getting your medicines, non-medical meetings or appointments, work, or from getting things that you need?: No   Physical Activity: Not on file   Stress: Not on file   Social Connections: Not on file   Interpersonal Safety: Low Risk  (10/18/2023)    Interpersonal Safety      Do you feel physically and emotionally safe where you currently live?: Yes      Within the past 12 months, have you been hit, slapped, kicked or otherwise physically hurt by someone?: No      Within the past 12 months, have you been humiliated or emotionally abused in other ways by your partner or ex-partner?: No   Housing Stability: Low Risk  (10/4/2023)    Housing Stability      Do you have housing? : Yes      Are you worried about losing your housing?: No     Family History   Problem Relation Age of Onset     Cancer Father          at age 70     Lipids Father      Hypertension Father      Diabetes Father         Adult Onset     Heart Disease Mother         low blood pressure, passed away 2004 lung cancer     Cancer Mother         Lung     Diabetes Mother         Adult Onset     Respiratory Sister         uses inhalers     Cancer Sister         Lung     Heart Disease Sister         Hx Rhumatic fever     Glaucoma No family hx of      Macular Degeneration No family hx of       ROS: 10 point ROS neg other than the symptoms noted above in the HPI.    PE:  B/P: 122/82, T: 96.1, P: Data Unavailable, R: Data Unavailable  General: well developed, well nourished WF who appears their stated age  HEENT: NC/AT, EOMI, (-)icterus, (-)injection  Neck: Supple, No JVD  Chest: CTA  Heart: S1, S2, (-)m/r/g  Abd: Soft, non tender, non distended, non tender, no masses  Rectal: No masses  Ext; Warm, no edema  Psych: AAOx3  Neuro: No focal deficits    Lab Results   Component Value Date  "   WBC 7.3 10/16/2023    WBC 6.9 11/25/2020     Lab Results   Component Value Date    RBC 4.51 10/16/2023    RBC 4.36 11/25/2020     Lab Results   Component Value Date    HGB 13.4 10/16/2023    HGB 13.1 11/25/2020     Lab Results   Component Value Date    HCT 41.8 10/16/2023    HCT 40.7 11/25/2020     No components found for: \"MCT\"  Lab Results   Component Value Date    MCV 93 10/16/2023    MCV 93 11/25/2020     Lab Results   Component Value Date    MCH 29.7 10/16/2023    MCH 30.0 11/25/2020     Lab Results   Component Value Date    MCHC 32.1 10/16/2023    MCHC 32.2 11/25/2020     Lab Results   Component Value Date    RDW 13.7 10/16/2023    RDW 12.0 11/25/2020     Lab Results   Component Value Date     10/16/2023     11/25/2020     Liver Function Studies -   Recent Labs   Lab Test 10/16/23  1355   PROTTOTAL 7.0   ALBUMIN 4.1   BILITOTAL 0.3   ALKPHOS 139*   AST 19   ALT 11     US -   US ABDOMEN LIMITED 10/23/2023 10:28 AM     CLINICAL HISTORY: Elevated alkaline phosphatase level  TECHNIQUE: Limited abdominal ultrasound.     COMPARISON: None.     FINDINGS:     GALLBLADDER: Filled with stones/sludge with wall echo shadow sign. No  wall thickening. No pericholecystic fluid. No sonographic Ryan's  sign.     BILE DUCTS: There is no intrahepatic biliary ductal dilatation. The  common duct measures 7 mm. Distal common bile duct obscured by bowel  gas.     LIVER: 15.0 cm. Normal hepatic parenchymal echotexture.     RIGHT KIDNEY: 11.9 cm. No hydronephrosis.     PANCREAS: The pancreas is largely obscured by overlying gas.     No ascites.                                                                      IMPRESSION:  1.  Cholelithiasis. No definite acute cholecystitis.     2.  Mild common duct dilation at 7 mm. If there is clinical concern  for a nonvisualized distal obstructing stone or mass, then MRI/MRCP is  recommended.     DANNIE VALVERDE MD          Impression/plan:  This is a 67-year-old lady with " intermittent elevation in her LFTs since being started on Rebif.  She was found to have some gallstones on an ultrasound for a mildly elevated alk phos.  I suspect these LFT elevations are due to the MS medication and not any gallstones.  I feel her gallstones are asymptomatic at this time.  I discussed these findings with the patient and she expressed understanding.  I did discuss the options of observation versus cholecystectomy with the patient and the patient wishes just to pursue observation at this time.  I did review gallbladder symptoms with her and she expressed understanding.  She will follow-up with us should she develop any symptoms.    Ziyad Isabel MD, FACS      Again, thank you for allowing me to participate in the care of your patient.        Sincerely,        Ziyad Isabel MD

## 2023-10-30 NOTE — PROGRESS NOTES
Consult request by Dr. Hurd      Reason for consultation: Gallstones    HPI:  Patient is a 67-year-old white female with a history of MS.  She is currently on Rebif for her MS.  On her most recent LFTs she was found to have a mildly elevated alk phos.  Review of her chart reveals she has had intermittent elevated LFTs over the past 5 years which I assume are attributed to her MS medication that she has been on since 2018.  Her PCP ordered a right upper quadrant ultrasound that revealed gallstones for which she is now referred.  She denies any abdominal pain, nausea, vomiting, fever, chills or any fatty food intolerance.  She does avoid fatty foods due to her 's cardiac disease.  Other than her MS she is completely asymptomatic.  She now presents to me for evaluation of her gallstones.    Past Medical History:   Diagnosis Date    Allergy     Depressive disorder     High cholesterol      Past Surgical History:   Procedure Laterality Date    COLONOSCOPY  1/24/2014    Procedure: COLONOSCOPY;  Colonoscopy;  Surgeon: Jose Martin Dye MD;  Location: [facility] LIGATE FALLOPIAN TUBE  1970's     Current Outpatient Medications   Medication    albuterol (PROAIR HFA/PROVENTIL HFA/VENTOLIN HFA) 108 (90 Base) MCG/ACT inhaler    aspirin 81 MG tablet    budesonide-formoterol (SYMBICORT) 160-4.5 MCG/ACT Inhaler    Cranberry 500 MG CAPS    estradiol (ESTRACE) 0.1 MG/GM vaginal cream    Krill Oil Omega-3 500 MG CAPS    montelukast (SINGULAIR) 10 MG tablet    REBIF REBIDOSE 44 MCG/0.5ML SOAJ    sertraline (ZOLOFT) 100 MG tablet    tolterodine ER (DETROL LA) 4 MG 24 hr capsule     Current Facility-Administered Medications   Medication    [START ON 11/13/2023] botulinum toxin type A (BOTOX) 100 units injection 100 Units        Allergies   Allergen Reactions    No Known Drug Allergy      Social History     Socioeconomic History    Marital status:      Spouse name: Luis    Number of children: 0    Years of  education: 12    Highest education level: Not on file   Occupational History    Occupation:      Employer: [employer]   Tobacco Use    Smoking status: Never    Smokeless tobacco: Never   Vaping Use    Vaping Use: Never used   Substance and Sexual Activity    Alcohol use: No     Alcohol/week: 0.0 standard drinks of alcohol    Drug use: No    Sexual activity: Yes     Partners: Male     Birth control/protection: Surgical, Female Surgical     Comment: Hx: Tubal Ligation   Other Topics Concern     Service No    Blood Transfusions No    Caffeine Concern Yes     Comment: coffee/tea: 2c/d    Occupational Exposure No    Hobby Hazards No    Sleep Concern No    Stress Concern No    Weight Concern No    Special Diet Yes     Comment: Eat  Healthy    Back Care Yes     Comment: Hx: chiropractor and PT    Exercise Yes     Comment: Yoga and swimming 2/wk    Bike Helmet No     Comment: N/A    Seat Belt Yes    Self-Exams Yes     Comment: Monthly    Parent/sibling w/ CABG, MI or angioplasty before 65F 55M? No   Social History Narrative    Not on file     Social Determinants of Health     Financial Resource Strain: Low Risk  (10/4/2023)    Financial Resource Strain     Within the past 12 months, have you or your family members you live with been unable to get utilities (heat, electricity) when it was really needed?: No   Food Insecurity: Low Risk  (10/4/2023)    Food Insecurity     Within the past 12 months, did you worry that your food would run out before you got money to buy more?: No     Within the past 12 months, did the food you bought just not last and you didn t have money to get more?: No   Transportation Needs: Low Risk  (10/4/2023)    Transportation Needs     Within the past 12 months, has lack of transportation kept you from medical appointments, getting your medicines, non-medical meetings or appointments, work, or from getting things that you need?: No   Physical Activity: Not on file  "  Stress: Not on file   Social Connections: Not on file   Interpersonal Safety: Low Risk  (10/18/2023)    Interpersonal Safety     Do you feel physically and emotionally safe where you currently live?: Yes     Within the past 12 months, have you been hit, slapped, kicked or otherwise physically hurt by someone?: No     Within the past 12 months, have you been humiliated or emotionally abused in other ways by your partner or ex-partner?: No   Housing Stability: Low Risk  (10/4/2023)    Housing Stability     Do you have housing? : Yes     Are you worried about losing your housing?: No     Family History   Problem Relation Age of Onset    Cancer Father          at age 70    Lipids Father     Hypertension Father     Diabetes Father         Adult Onset    Heart Disease Mother         low blood pressure, passed away 2004 lung cancer    Cancer Mother         Lung    Diabetes Mother         Adult Onset    Respiratory Sister         uses inhalers    Cancer Sister         Lung    Heart Disease Sister         Hx Rhumatic fever    Glaucoma No family hx of     Macular Degeneration No family hx of       ROS: 10 point ROS neg other than the symptoms noted above in the HPI.    PE:  B/P: 122/82, T: 96.1, P: Data Unavailable, R: Data Unavailable  General: well developed, well nourished WF who appears their stated age  HEENT: NC/AT, EOMI, (-)icterus, (-)injection  Neck: Supple, No JVD  Chest: CTA  Heart: S1, S2, (-)m/r/g  Abd: Soft, non tender, non distended, non tender, no masses  Rectal: No masses  Ext; Warm, no edema  Psych: AAOx3  Neuro: No focal deficits    Lab Results   Component Value Date    WBC 7.3 10/16/2023    WBC 6.9 2020     Lab Results   Component Value Date    RBC 4.51 10/16/2023    RBC 4.36 2020     Lab Results   Component Value Date    HGB 13.4 10/16/2023    HGB 13.1 2020     Lab Results   Component Value Date    HCT 41.8 10/16/2023    HCT 40.7 2020     No components found for: \"MCT\"  Lab " Results   Component Value Date    MCV 93 10/16/2023    MCV 93 11/25/2020     Lab Results   Component Value Date    MCH 29.7 10/16/2023    MCH 30.0 11/25/2020     Lab Results   Component Value Date    MCHC 32.1 10/16/2023    MCHC 32.2 11/25/2020     Lab Results   Component Value Date    RDW 13.7 10/16/2023    RDW 12.0 11/25/2020     Lab Results   Component Value Date     10/16/2023     11/25/2020     Liver Function Studies -   Recent Labs   Lab Test 10/16/23  1355   PROTTOTAL 7.0   ALBUMIN 4.1   BILITOTAL 0.3   ALKPHOS 139*   AST 19   ALT 11     US -   US ABDOMEN LIMITED 10/23/2023 10:28 AM     CLINICAL HISTORY: Elevated alkaline phosphatase level  TECHNIQUE: Limited abdominal ultrasound.     COMPARISON: None.     FINDINGS:     GALLBLADDER: Filled with stones/sludge with wall echo shadow sign. No  wall thickening. No pericholecystic fluid. No sonographic Ryan's  sign.     BILE DUCTS: There is no intrahepatic biliary ductal dilatation. The  common duct measures 7 mm. Distal common bile duct obscured by bowel  gas.     LIVER: 15.0 cm. Normal hepatic parenchymal echotexture.     RIGHT KIDNEY: 11.9 cm. No hydronephrosis.     PANCREAS: The pancreas is largely obscured by overlying gas.     No ascites.                                                                      IMPRESSION:  1.  Cholelithiasis. No definite acute cholecystitis.     2.  Mild common duct dilation at 7 mm. If there is clinical concern  for a nonvisualized distal obstructing stone or mass, then MRI/MRCP is  recommended.     DANNIE VALVERDE MD          Impression/plan:  This is a 67-year-old lady with intermittent elevation in her LFTs since being started on Rebif.  She was found to have some gallstones on an ultrasound for a mildly elevated alk phos.  I suspect these LFT elevations are due to the MS medication and not any gallstones.  I feel her gallstones are asymptomatic at this time.  I discussed these findings with the patient  and she expressed understanding.  I did discuss the options of observation versus cholecystectomy with the patient and the patient wishes just to pursue observation at this time.  I did review gallbladder symptoms with her and she expressed understanding.  She will follow-up with us should she develop any symptoms.    Ziyad Isabel MD, FACS

## 2023-11-01 ENCOUNTER — PATIENT OUTREACH (OUTPATIENT)
Dept: CARE COORDINATION | Facility: CLINIC | Age: 68
End: 2023-11-01
Payer: MEDICARE

## 2023-11-21 ENCOUNTER — OFFICE VISIT (OUTPATIENT)
Dept: UROLOGY | Facility: CLINIC | Age: 68
End: 2023-11-21
Payer: MEDICARE

## 2023-11-21 VITALS
DIASTOLIC BLOOD PRESSURE: 76 MMHG | HEIGHT: 66 IN | TEMPERATURE: 97.6 F | BODY MASS INDEX: 28.12 KG/M2 | SYSTOLIC BLOOD PRESSURE: 116 MMHG | WEIGHT: 175 LBS

## 2023-11-21 DIAGNOSIS — N32.81 OAB (OVERACTIVE BLADDER): Primary | ICD-10-CM

## 2023-11-21 PROCEDURE — 52000 CYSTOURETHROSCOPY: CPT | Performed by: UROLOGY

## 2023-11-21 ASSESSMENT — PAIN SCALES - GENERAL: PAINLEVEL: NO PAIN (0)

## 2023-11-21 NOTE — PROGRESS NOTES
F/u mixed neurogenic bladder with mixed UI, MS, atrophic vaginitis (Estrace), s/p Botox #1 on 5/2/23 5/2/23 Botox #1    Pt again reiterates her success with the first therapy.    Here for Botox #2. Discussed in detail, informed consent obtained.    Botox A 100 units in 6 ml saline injected into single site in posterior wall; excellent bleb and hemostasis.       IMP:  1. Botox for neurogenic bladder      PLAN:  1. Discussed situation with patient in detail.  2. Virtual visit 2 wks  3. Precautions given in detail  4. Levoquin x 1 (pt has with her)

## 2023-11-21 NOTE — PROGRESS NOTES
Clinic Administered Medication Documentation      Injectable Medication Documentation    Is there an active order (written within the past 365 days, with administrations remaining, not ) in the chart? Yes.     Patient was given  botulinum toxin type A (BOTOX) 100 units injection 100 Units . Prior to medication administration, verified patient's identity using patient s name and date of birth. Please see MAR and medication order for additional information. Patient instructed to remain in clinic for 15 minutes and report any adverse reaction to staff immediately.    Vial/Syringe: Single dose vial. Was entire vial of medication used? Yes  Was this medication supplied by the patient? No  Is this a medication the patient will need to receive again? No - not necessary to check for refills remaining.    Floresita Dean MA

## 2023-12-05 ENCOUNTER — VIRTUAL VISIT (OUTPATIENT)
Dept: UROLOGY | Facility: CLINIC | Age: 68
End: 2023-12-05
Payer: MEDICARE

## 2023-12-05 DIAGNOSIS — N31.9 NEUROGENIC BLADDER: Primary | ICD-10-CM

## 2023-12-05 PROCEDURE — 99213 OFFICE O/P EST LOW 20 MIN: CPT | Mod: VID | Performed by: UROLOGY

## 2023-12-05 NOTE — PROGRESS NOTES
F/u mixed neurogenic bladder with mixed UI, MS, atrophic vaginitis (Estrace)        5/2/23   Botox #1 with 100 units  11/21/23 Botox #2 with 100 units      Bladder control is now excellent. Completely dry and pad-free day and nite.    Denies dysuria, gross hematuria, daytime frequency; noc x 1.    Compliant with Estace.    Very pleased.      IMP:  1. Neurogenic bladder, doing well on Botox  2. Atrophic vaginitis, doing well on Estrace      PLAN:  1. Discussed situation with patient in detail.  2. Continue Estrace thrice weekly  3. RTC 6 mos for next Botox or sooner prn\  4. Total time spent in reviewing patient's previous records, discussion with patient and documentation: 20 minutes

## 2024-01-06 ENCOUNTER — HEALTH MAINTENANCE LETTER (OUTPATIENT)
Age: 69
End: 2024-01-06

## 2024-02-06 ENCOUNTER — TELEPHONE (OUTPATIENT)
Dept: NEUROLOGY | Facility: CLINIC | Age: 69
End: 2024-02-06
Payer: MEDICARE

## 2024-02-06 NOTE — TELEPHONE ENCOUNTER
We received a Prescription and Service Request Form from [facility] regarding her Rebif. We need to know if she is using the Rebiject Autoinjector or the Rebif Rebidose Injector. There was no answer so a message was left on her VM requesting a call back. Form is on my desk and will submit to [facility] once clarified.    Maylin Bearden RN Care Coordinator   Neurology/Neurosurgery/PM&R/ Pain Management

## 2024-02-06 NOTE — CONFIDENTIAL NOTE
[facility] Confirmation of successful delivery was received. Copy sent to scanning.     Maylin Bearden RN Care Coordinator   Neurology/Neurosurgery/PM&R/ Pain Management

## 2024-03-06 ENCOUNTER — MYC MEDICAL ADVICE (OUTPATIENT)
Dept: FAMILY MEDICINE | Facility: CLINIC | Age: 69
End: 2024-03-06
Payer: MEDICARE

## 2024-03-15 ENCOUNTER — MYC MEDICAL ADVICE (OUTPATIENT)
Dept: FAMILY MEDICINE | Facility: CLINIC | Age: 69
End: 2024-03-15
Payer: MEDICARE

## 2024-03-15 DIAGNOSIS — Z29.11 NEED FOR VACCINATION AGAINST RESPIRATORY SYNCYTIAL VIRUS: ICD-10-CM

## 2024-03-19 RX ORDER — BUDESONIDE AND FORMOTEROL FUMARATE DIHYDRATE 160; 4.5 UG/1; UG/1
2 AEROSOL RESPIRATORY (INHALATION) 2 TIMES DAILY
Qty: 10.2 G | Refills: 1 | Status: SHIPPED | OUTPATIENT
Start: 2024-03-19 | End: 2024-08-30

## 2024-03-21 ENCOUNTER — MYC MEDICAL ADVICE (OUTPATIENT)
Dept: FAMILY MEDICINE | Facility: CLINIC | Age: 69
End: 2024-03-21
Payer: MEDICARE

## 2024-05-03 ENCOUNTER — HOSPITAL ENCOUNTER (OUTPATIENT)
Dept: MAMMOGRAPHY | Facility: CLINIC | Age: 69
Discharge: HOME | End: 2024-05-03
Attending: FAMILY MEDICINE | Admitting: FAMILY MEDICINE
Payer: MEDICARE

## 2024-05-03 DIAGNOSIS — Z12.31 VISIT FOR SCREENING MAMMOGRAM: ICD-10-CM

## 2024-05-03 PROCEDURE — 77063 BREAST TOMOSYNTHESIS BI: CPT

## 2024-05-07 ENCOUNTER — OFFICE VISIT (OUTPATIENT)
Dept: UROLOGY | Facility: CLINIC | Age: 69
End: 2024-05-07
Payer: MEDICARE

## 2024-05-07 VITALS — HEIGHT: 66 IN | BODY MASS INDEX: 28.12 KG/M2 | WEIGHT: 175 LBS

## 2024-05-07 DIAGNOSIS — N31.9 NEUROGENIC BLADDER: Primary | ICD-10-CM

## 2024-05-07 PROCEDURE — 52287 CYSTOSCOPY CHEMODENERVATION: CPT | Performed by: UROLOGY

## 2024-05-07 ASSESSMENT — PAIN SCALES - GENERAL: PAINLEVEL: NO PAIN (0)

## 2024-05-07 ASSESSMENT — ANXIETY QUESTIONNAIRES
5. BEING SO RESTLESS THAT IT IS HARD TO SIT STILL: NOT AT ALL
2. NOT BEING ABLE TO STOP OR CONTROL WORRYING: NOT AT ALL
3. WORRYING TOO MUCH ABOUT DIFFERENT THINGS: NOT AT ALL
4. TROUBLE RELAXING: MORE THAN HALF THE DAYS
1. FEELING NERVOUS, ANXIOUS, OR ON EDGE: NOT AT ALL
6. BECOMING EASILY ANNOYED OR IRRITABLE: NOT AT ALL
GAD7 TOTAL SCORE: 2
7. FEELING AFRAID AS IF SOMETHING AWFUL MIGHT HAPPEN: NOT AT ALL
IF YOU CHECKED OFF ANY PROBLEMS ON THIS QUESTIONNAIRE, HOW DIFFICULT HAVE THESE PROBLEMS MADE IT FOR YOU TO DO YOUR WORK, TAKE CARE OF THINGS AT HOME, OR GET ALONG WITH OTHER PEOPLE: NOT DIFFICULT AT ALL
GAD7 TOTAL SCORE: 2

## 2024-05-07 NOTE — PROGRESS NOTES
F/u mixed neurogenic bladder with mixed UI, MS, atrophic vaginitis (Estrace), s/p Botox #1 on 5/2/23 5/2/23  Botox #1  11/21/23 Botox #2     Pt again reiterates her success with the first therapy.     Here for Botox #3. Discussed in detail, informed consent obtained.     Botox A 100 units in 6 ml saline injected into single site in posterior wall; excellent bleb and hemostasis.         IMP:  1. Botox for neurogenic bladder        PLAN:  1. Discussed situation with patient in detail.  2. Virtual visit 2 wks  3. Precautions given in detail  4. Levoquin x 1 (pt will take asap after this visit)

## 2024-05-07 NOTE — PROGRESS NOTES
The following medication was given:     MEDICATION: Botox 100 units  ROUTE: N/A  SITE: bladder  DOSE: 100 units  LOT #: [id number]  :  N/A  EXPIRATION DATE:  05/2026  Dejah Grady MA on 5/7/2024 at 11:16 AM

## 2024-05-08 ENCOUNTER — TELEPHONE (OUTPATIENT)
Dept: FAMILY MEDICINE | Facility: CLINIC | Age: 69
End: 2024-05-08
Payer: MEDICARE

## 2024-05-08 NOTE — TELEPHONE ENCOUNTER
Received call from imaging requesting that ancillary orders be signed for diagnostic mammogram. Placed note on PCP's computer.    ZAINAB Brown, RN

## 2024-05-15 DIAGNOSIS — J30.1 SEASONAL ALLERGIC RHINITIS DUE TO POLLEN: ICD-10-CM

## 2024-05-15 RX ORDER — MONTELUKAST SODIUM 10 MG/1
TABLET ORAL
Qty: 60 TABLET | Refills: 1 | Status: SHIPPED | OUTPATIENT
Start: 2024-05-15

## 2024-05-21 ENCOUNTER — HOSPITAL ENCOUNTER (OUTPATIENT)
Dept: MAMMOGRAPHY | Facility: CLINIC | Age: 69
Discharge: HOME | End: 2024-05-21
Attending: FAMILY MEDICINE
Payer: MEDICARE

## 2024-05-21 DIAGNOSIS — R92.8 ABNORMAL MAMMOGRAM: ICD-10-CM

## 2024-05-21 PROCEDURE — 77061 BREAST TOMOSYNTHESIS UNI: CPT | Mod: RT

## 2024-05-29 ENCOUNTER — PATIENT OUTREACH (OUTPATIENT)
Dept: CARE COORDINATION | Facility: CLINIC | Age: 69
End: 2024-05-29
Payer: MEDICARE

## 2024-07-25 SDOH — HEALTH STABILITY: PHYSICAL HEALTH
ON AVERAGE, HOW MANY DAYS PER WEEK DO YOU ENGAGE IN MODERATE TO STRENUOUS EXERCISE (LIKE A BRISK WALK)?: PATIENT DECLINED

## 2024-07-25 SDOH — HEALTH STABILITY: PHYSICAL HEALTH: ON AVERAGE, HOW MANY MINUTES DO YOU ENGAGE IN EXERCISE AT THIS LEVEL?: PATIENT DECLINED

## 2024-07-25 ASSESSMENT — ASTHMA QUESTIONNAIRES
QUESTION_3 LAST FOUR WEEKS HOW OFTEN DID YOUR ASTHMA SYMPTOMS (WHEEZING, COUGHING, SHORTNESS OF BREATH, CHEST TIGHTNESS OR PAIN) WAKE YOU UP AT NIGHT OR EARLIER THAN USUAL IN THE MORNING: ONCE OR TWICE
QUESTION_1 LAST FOUR WEEKS HOW MUCH OF THE TIME DID YOUR ASTHMA KEEP YOU FROM GETTING AS MUCH DONE AT WORK, SCHOOL OR AT HOME: NONE OF THE TIME
QUESTION_4 LAST FOUR WEEKS HOW OFTEN HAVE YOU USED YOUR RESCUE INHALER OR NEBULIZER MEDICATION (SUCH AS ALBUTEROL): ONCE A WEEK OR LESS
ACT_TOTALSCORE: 22
QUESTION_2 LAST FOUR WEEKS HOW OFTEN HAVE YOU HAD SHORTNESS OF BREATH: NOT AT ALL
QUESTION_5 LAST FOUR WEEKS HOW WOULD YOU RATE YOUR ASTHMA CONTROL: WELL CONTROLLED
ACT_TOTALSCORE: 22

## 2024-07-25 ASSESSMENT — PATIENT HEALTH QUESTIONNAIRE - PHQ9
SUM OF ALL RESPONSES TO PHQ QUESTIONS 1-9: 1
10. IF YOU CHECKED OFF ANY PROBLEMS, HOW DIFFICULT HAVE THESE PROBLEMS MADE IT FOR YOU TO DO YOUR WORK, TAKE CARE OF THINGS AT HOME, OR GET ALONG WITH OTHER PEOPLE: NOT DIFFICULT AT ALL
SUM OF ALL RESPONSES TO PHQ QUESTIONS 1-9: 1

## 2024-07-25 ASSESSMENT — SOCIAL DETERMINANTS OF HEALTH (SDOH): HOW OFTEN DO YOU GET TOGETHER WITH FRIENDS OR RELATIVES?: PATIENT DECLINED

## 2024-07-26 ENCOUNTER — OFFICE VISIT (OUTPATIENT)
Dept: FAMILY MEDICINE | Facility: CLINIC | Age: 69
End: 2024-07-26
Payer: MEDICARE

## 2024-07-26 VITALS
RESPIRATION RATE: 18 BRPM | HEART RATE: 72 BPM | DIASTOLIC BLOOD PRESSURE: 82 MMHG | TEMPERATURE: 97.6 F | BODY MASS INDEX: 29.18 KG/M2 | HEIGHT: 66 IN | SYSTOLIC BLOOD PRESSURE: 118 MMHG | WEIGHT: 181.6 LBS | OXYGEN SATURATION: 97 %

## 2024-07-26 DIAGNOSIS — J45.901 MODERATE ASTHMA WITH EXACERBATION, UNSPECIFIED WHETHER PERSISTENT: ICD-10-CM

## 2024-07-26 DIAGNOSIS — Z12.11 SCREEN FOR COLON CANCER: ICD-10-CM

## 2024-07-26 DIAGNOSIS — Z00.00 ANNUAL PHYSICAL EXAM: Primary | ICD-10-CM

## 2024-07-26 LAB
FEF 25/75: NORMAL
FEV-1: NORMAL
FEV1/FVC: NORMAL
FVC: NORMAL

## 2024-07-26 PROCEDURE — 94010 BREATHING CAPACITY TEST: CPT | Performed by: FAMILY MEDICINE

## 2024-07-26 PROCEDURE — 99397 PER PM REEVAL EST PAT 65+ YR: CPT | Mod: 25 | Performed by: FAMILY MEDICINE

## 2024-07-26 PROCEDURE — 99214 OFFICE O/P EST MOD 30 MIN: CPT | Mod: 25 | Performed by: FAMILY MEDICINE

## 2024-07-26 ASSESSMENT — PAIN SCALES - GENERAL: PAINLEVEL: NO PAIN (0)

## 2024-07-26 NOTE — PROGRESS NOTES
Preventive Care Visit  [facility]  Valdez Hurd MD, Family Medicine  Jul 26, 2024      Assessment & Plan       ICD-10-CM    1. Annual physical exam  Z00.00       2. Screen for colon cancer  Z12.11 Colonoscopy Screening  Referral      3. Moderate asthma with exacerbation, unspecified whether persistent  J45.901 SPIROMETRY, BREATHING CAPACITY     SPIROMETRY, BREATHING CAPACITY         Annual topics discussed  Due for screening test work as above  Moderate persistent asthma.  Historically has been doing as needed Symbicort.  But more recently complaining of occasional nighttime waning.  Her spirometry today shows mild obstruction.  For the next month I will have her use her Symbicort on a more regular basis and see if she notices improvement.    Return in about 1 week (around 8/2/2024).    Valdez Hurd MD  [facility] SAM Turner is a 68 year old, presenting for the following:  Wellness Visit and Rx Recheck        7/26/2024     9:11 AM   Additional Questions   Roomed by [facility] Care Directive  Patient does not have a Health Care Directive or Living Will: Patient states has Advance Directive and will bring in a copy to clinic.    HPI    Feeling dizzy with position changes, getting better  Noting occ wheeze at night, but no ALAS, coughing  Uses symbicort occ with flares (after mowing)          7/25/2024   General Health   How would you rate your overall physical health? Good   Feel stress (tense, anxious, or unable to sleep) Not at all            7/25/2024   Nutrition   Diet: Regular (no restrictions)            7/25/2024   Exercise   Days per week of moderate/strenous exercise Patient declined   Average minutes spent exercising at this level Patient declined            7/25/2024   Social Factors   Frequency of gathering with friends or relatives Patient declined   Worry food won't last until get money to buy more No   Food  not last or not have enough money for food? No   Do you have housing? (Housing is defined as stable permanent housing and does not include staying ouside in a car, in a tent, in an abandoned building, in an overnight shelter, or couch-surfing.) Yes   Are you worried about losing your housing? No   Lack of transportation? No   Unable to get utilities (heat,electricity)? No            7/25/2024   Fall Risk   Fallen 2 or more times in the past year? No    No   Trouble with walking or balance? No    No       Multiple values from one day are sorted in reverse-chronological order          7/25/2024   Activities of Daily Living- Home Safety   Needs help with the following daily activites None of the above   Safety concerns in the home None of the above            7/25/2024   Dental   Dentist two times every year? Yes            7/25/2024   Hearing Screening   Hearing concerns? None of the above            7/25/2024   Driving Risk Screening   Patient/family members have concerns about driving No            7/25/2024   General Alertness/Fatigue Screening   Have you been more tired than usual lately? (!) DECLINE            7/25/2024   Urinary Incontinence Screening   Bothered by leaking urine in past 6 months Yes            7/25/2024   TB Screening   Were you born outside of the [place]? Yes          Today's PHQ-9 Score:       7/25/2024    11:14 AM   PHQ-9 SCORE   PHQ-9 Total Score MyChart 1 (Minimal depression)   PHQ-9 Total Score 1         7/25/2024   Substance Use   Alcohol more than 3/day or more than 7/wk Not Applicable   Do you have a current opioid prescription? No   How severe/bad is pain from 1 to 10? 0/10 (No Pain)   Do you use any other substances recreationally? No    (!) DECLINE       Multiple values from one day are sorted in reverse-chronological order     Social History     Tobacco Use    Smoking status: Never    Smokeless tobacco: Former   Vaping Use    Vaping status: Never Used   Substance Use Topics    Alcohol  use: Not Currently     Comment: rarely.    Drug use: No           5/3/2024   LAST FHS-7 RESULTS   1st degree relative breast or ovarian cancer No   Any relative bilateral breast cancer No   Any male have breast cancer No   Any ONE woman have BOTH breast AND ovarian cancer No   Any woman with breast cancer before 50yrs No   2 or more relatives with breast AND/OR ovarian cancer No   2 or more relatives with breast AND/OR bowel cancer No                 History of abnormal Pap smear:         Latest Ref Rng & Units 2/5/2018     8:30 AM 2/5/2018     8:11 AM 3/2/2015    12:00 AM   PAP / HPV   PAP (Historical)   NIL  NIL    HPV 16 DNA NEG^Negative Negative      HPV 18 DNA NEG^Negative Negative      Other HR HPV NEG^Negative Negative        ASCVD Risk   The 10-year ASCVD risk score (Mary Ann RUELAS, et al., 2019) is: 6.1%    Values used to calculate the score:      Age: 68 years      Sex: Female      Is Non- : No      Diabetic: No      Tobacco smoker: No      Systolic Blood Pressure: 118 mmHg      Is BP treated: No      HDL Cholesterol: 37 mg/dL      Total Cholesterol: 160 mg/dL            Reviewed and updated as needed this visit by Provider                      Current providers sharing in care for this patient include:  Patient Care Team:  Valdez Hurd MD as PCP - General (Family Medicine)  Ashely Kwon MD (Neurology)  Viraj Santoyo MD as Kevon Grier MD as MD (Ophthalmology)  Reynaldo Ho MD as MD (Ophthalmology)  Valdez Hurd MD as Assigned PCP  Kevon Fortune MD as MD (Ophthalmology)  Nato Ibrahim MD as MD (Urology)  Nato Ibrahim MD as Assigned Surgical Provider  Ziyad Isabel MD as Assigned Heart and Vascular Provider  Nato Ibrahim MD as MD (Urology)    The following health maintenance items are reviewed in Epic and correct as of today:  Health Maintenance   Topic Date Due    ASTHMA ACTION  "PLAN  Never done    RSV VACCINE (Pregnancy & 60+) (1 - 1-dose 60+ series) Never done    MEDICARE ANNUAL WELLNESS VISIT  12/01/2023    LIPID  12/01/2023    ANNUAL REVIEW OF HM ORDERS  12/01/2023    COVID-19 Vaccine (7 - 2023-24 season) 01/01/2024    COLORECTAL CANCER SCREENING  01/24/2024    INFLUENZA VACCINE (1) 09/01/2024    LEATHA ASSESSMENT  11/07/2024    ASTHMA CONTROL TEST  01/26/2025    PHQ-9  01/26/2025    FALL RISK ASSESSMENT  07/26/2025    GLUCOSE  12/01/2025    MAMMO SCREENING  05/21/2026    DTAP/TDAP/TD IMMUNIZATION (2 - Td or Tdap) 01/27/2027    ADVANCE CARE PLANNING  07/26/2029    DEXA  12/08/2037    HEPATITIS C SCREENING  Completed    DEPRESSION ACTION PLAN  Completed    Pneumococcal Vaccine: 65+ Years  Completed    ZOSTER IMMUNIZATION  Completed    IPV IMMUNIZATION  Aged Out    HPV IMMUNIZATION  Aged Out    MENINGITIS IMMUNIZATION  Aged Out    RSV MONOCLONAL ANTIBODY  Aged Out    PAP  Discontinued         Review of Systems  Constitutional, HEENT, cardiovascular, pulmonary, gi and gu systems are negative, except as otherwise noted.     Objective    Exam  /82   Pulse 72   Temp 97.6  F (36.4  C) (Temporal)   Resp 18   Ht 1.676 m (5' 6\")   Wt 82.4 kg (181 lb 9.6 oz)   LMP 02/05/2008   SpO2 97%   BMI 29.31 kg/m     Estimated body mass index is 29.31 kg/m  as calculated from the following:    Height as of this encounter: 1.676 m (5' 6\").    Weight as of this encounter: 82.4 kg (181 lb 9.6 oz).    Physical Exam  GENERAL: alert and no distress  NECK: no adenopathy, no asymmetry, masses, or scars  RESP: lungs clear to auscultation - no rales, rhonchi or wheezes  CV: regular rate and rhythm, normal S1 S2, no S3 or S4, no murmur, click or rub, no peripheral edema  ABDOMEN: soft, nontender, no hepatosplenomegaly, no masses and bowel sounds normal  MS: no gross musculoskeletal defects noted, no edema         7/26/2024   Mini Cog   Clock Draw Score 2 Normal   3 Item Recall 3 objects recalled   Mini Cog " Total Score 5                 Signed Electronically by: Valdez Hurd MD

## 2024-07-29 ENCOUNTER — HOSPITAL ENCOUNTER (OUTPATIENT)
Facility: CLINIC | Age: 69
End: 2024-07-29
Attending: INTERNAL MEDICINE | Admitting: INTERNAL MEDICINE
Payer: MEDICARE

## 2024-07-29 ENCOUNTER — TELEPHONE (OUTPATIENT)
Dept: GASTROENTEROLOGY | Facility: CLINIC | Age: 69
End: 2024-07-29
Payer: MEDICARE

## 2024-07-29 NOTE — TELEPHONE ENCOUNTER
"Endoscopy Scheduling Screen    Have you had a positive Covid test in the last 14 days?  No    What is your communication preference for Instructions and/or Bowel Prep?   MyChart    What insurance is in the chart?  Other:  MEDICARE    Ordering/Referring Provider: ROMAN SIMPSON   (If ordering provider performs procedure, schedule with ordering provider unless otherwise instructed. )    BMI: Estimated body mass index is 29.31 kg/m  as calculated from the following:    Height as of 7/26/24: 1.676 m (5' 6\").    Weight as of 7/26/24: 82.4 kg (181 lb 9.6 oz).     Sedation Ordered  moderate sedation.   If patient BMI > 50 do not schedule in [facility].    If patient BMI > 45 do not schedule at [facility].    Are you taking methadone or Suboxone?  No    Have you had difficulties, pain, or discomfort during past endoscopy procedures?  No    Are you taking any prescription medications for pain 3 or more times per week?   NO, No RN review required.    Do you have a history of malignant hyperthermia?  No    (Females) Are you currently pregnant?   No     Have you been diagnosed or told you have pulmonary hypertension?   No    Do you have an LVAD?  No    Have you been told you have moderate to severe sleep apnea?  No    Have you been told you have COPD, asthma, or any other lung disease?  Yes     What breathing problems do you have?  Asthma     Do you use home oxygen?  No    Have your breathing problems required an ED visit or hospitalization in the last year?  No    Do you have any heart conditions?  No     Have you ever had or are you waiting for an organ transplant?  No. Continue scheduling, no site restrictions.    Have you had a stroke or transient ischemic attack (TIA aka \"mini stroke\" in the last 6 months?   No    Have you been diagnosed with or been told you have cirrhosis of the liver?   No    Are you currently on dialysis?   No    Do you need assistance transferring?   No    BMI: Estimated body mass index is 29.31 kg/m  as " rate decreased to 16 units an hour. verified with 2 rn's. Ines goldsmith and Mya damon. icu MD at bedside. "calculated from the following:    Height as of 7/26/24: 1.676 m (5' 6\").    Weight as of 7/26/24: 82.4 kg (181 lb 9.6 oz).     Is patients BMI > 40 and scheduling location [facility]?  No    Do you take an injectable medication for weight loss or diabetes (excluding insulin)?  No    Do you take the medication Naltrexone?  No    Do you take blood thinners?  No       Prep   Are you currently on dialysis or do you have chronic kidney disease?  No    Do you have a diagnosis of diabetes?  No    Do you have a diagnosis of cystic fibrosis (CF)?  No    On a regular basis do you go 3 -5 days between bowel movements?  No    BMI > 40?  No    Preferred Pharmacy:    [facility] - [place], [place] - [address]  [address]  [facility] [address]  Phone: [phone] Fax: [phone]    Final Scheduling Details     Procedure scheduled  Colonoscopy    Surgeon:  SANDRA     Date of procedure:  9/3/24     Pre-OP / PAC:   No - Not required for this site.    Location  [facility] - Per order.    Sedation   MAC/Deep Sedation  Per location.      Patient Reminders:   You will receive a call from a Nurse to review instructions and health history.  This assessment must be completed prior to your procedure.  Failure to complete the Nurse assessment may result in the procedure being cancelled.      On the day of your procedure, please designate an adult(s) who can drive you home stay with you for the next 24 hours. The medicines used in the exam will make you sleepy. You will not be able to drive.      You cannot take public transportation, ride share services, or non-medical taxi service without a responsible caregiver.  Medical transport services are allowed with the requirement that a responsible caregiver will receive you at your destination.  We require that drivers and caregivers are confirmed prior to your procedure.    " rate decreased to 16 units an hour. verified with 2 rn's. Ines goldsmith and Mya damon. icu MD at bedside. next aptt due at 2200. rate decreased to 1,600 units an hour. verified with 2 rn's. Ines goldsmith and Mya damno. icu MD at bedside. next aptt due at 2200. rate decreased to 1,600 units an hour. verified with 2 rn's. Ines goldsmith and Mya damon. icu MD at bedside. next aptt due at 2000.

## 2024-08-12 ENCOUNTER — MYC MEDICAL ADVICE (OUTPATIENT)
Dept: GASTROENTEROLOGY | Facility: CLINIC | Age: 69
End: 2024-08-12
Payer: MEDICARE

## 2024-08-12 NOTE — TELEPHONE ENCOUNTER
Standard Miralax Bowel Prep recommended due to standard bowel prep. Instructions were sent via [employer].

## 2024-08-16 DIAGNOSIS — F33.0 MAJOR DEPRESSIVE DISORDER, RECURRENT EPISODE, MILD (H): ICD-10-CM

## 2024-08-16 RX ORDER — SERTRALINE HYDROCHLORIDE 100 MG/1
TABLET, FILM COATED ORAL
Qty: 135 TABLET | Refills: 0 | Status: SHIPPED | OUTPATIENT
Start: 2024-08-16

## 2024-08-22 ENCOUNTER — OFFICE VISIT (OUTPATIENT)
Dept: PODIATRY | Facility: CLINIC | Age: 69
End: 2024-08-22
Payer: MEDICARE

## 2024-08-22 ENCOUNTER — ANCILLARY PROCEDURE (OUTPATIENT)
Dept: GENERAL RADIOLOGY | Facility: CLINIC | Age: 69
End: 2024-08-22
Attending: PODIATRIST
Payer: MEDICARE

## 2024-08-22 VITALS
DIASTOLIC BLOOD PRESSURE: 86 MMHG | BODY MASS INDEX: 28.28 KG/M2 | SYSTOLIC BLOOD PRESSURE: 136 MMHG | WEIGHT: 176 LBS | HEIGHT: 66 IN

## 2024-08-22 DIAGNOSIS — L60.3 ONYCHODYSTROPHY: ICD-10-CM

## 2024-08-22 DIAGNOSIS — Q66.6 PES VALGUS: Primary | ICD-10-CM

## 2024-08-22 DIAGNOSIS — M79.673 FOOT PAIN: ICD-10-CM

## 2024-08-22 DIAGNOSIS — G35 MULTIPLE SCLEROSIS (H): ICD-10-CM

## 2024-08-22 PROCEDURE — 99203 OFFICE O/P NEW LOW 30 MIN: CPT | Performed by: PODIATRIST

## 2024-08-22 PROCEDURE — 73630 X-RAY EXAM OF FOOT: CPT | Mod: LT | Performed by: STUDENT IN AN ORGANIZED HEALTH CARE EDUCATION/TRAINING PROGRAM

## 2024-08-22 RX ORDER — AMOXICILLIN 500 MG/1
CAPSULE ORAL
COMMUNITY
Start: 2024-08-15

## 2024-08-22 ASSESSMENT — PAIN SCALES - GENERAL: PAINLEVEL: NO PAIN (0)

## 2024-08-22 NOTE — PATIENT INSTRUCTIONS
Reliable shoe stores: To maximize your experience and provide the best possible fit.  Be sure to show them your foot concerns and tell them Dr. Paez sent you.      Stores listed in bold have only athletic shoes, and stores that are not bold are mostly casual or variety of shoes    [employer]  [address] [place] [address]  [phone]    [facility] [place]  [address] [place] [address]  [phone]     [employer] [place]  [address] [place] [address]  [phone]    [employer]  [address] [facility]  [facility] [address]  [phone]    [employer]  [address] [place], [place] [address] [phone]    [employer]  [address] [place] [address]  [phone]                         [employer] Locations:     [address] [place] [address]   [phone]     [address] [place] [address] [address] [place] [address]   [phone]     [address] [place] [address]   [phone]     [address] [facility] [place] [address]   [phone]     [address] [facility] [address] [place] [address]   [phone]     [address] [place] [address]   [phone]     [address] [facility] [address]   [place] [address]   [phone]     [address] [facility]   [place] [address]   [phone]             How to find reasonable shoes          The correct width    Correct Fitting    Correct Length      Foot Distortion    Posture Distortion                          Torsional Rigidity      Grasp behind the heel and underneath the foot and twist      Bad    Excessive torsion/twist in midfoot     Less torsion/twist in midfoot is better                   Heel Counter Rigidity      Grasp just above   midsole and squeeze      Bad    Soft heel counter      Good    Rigid Heel Counter      Flexion Rigidity      Grasp shoe and bend from forefoot to rearfoot

## 2024-08-22 NOTE — LETTER
8/22/2024      Cecile Mays  [address]  [facility] [address]      Dear Colleague,    Thank you for referring your patient, Cecile Mays, to the [facility]. Please see a copy of my visit note below.    HPI:  Cecile Mays is a 68 year old female who is seen in consultation at the request of self.    Pt presents for eval of:   (Onset, Location, L/R, Character, Treatments, Injury if yes)    XR Left and Right foot 8/22/2024     Onset May 2024, rolled a table over Right toe and June 2024 over Left toes. Toenails are discolored.  She also has questions about the condition of her feet as they sometimes hurt.    Retired.      ROS:  10 point ROS neg other than the symptoms noted above in the HPI.    Patient Active Problem List   Diagnosis     Urge incontinence     Allergic rhinitis     Hyperlipidemia LDL goal <130     Gastroesophageal reflux disease with esophagitis     Adjustment disorder with anxious mood     Sacroiliac joint dysfunction     Multiple sclerosis (H)     Lumbago     Major depressive disorder, recurrent episode, mild (H24)     Vision changes       PAST MEDICAL HISTORY:   Past Medical History:   Diagnosis Date     Allergy      Depressive disorder      High cholesterol      Uncomplicated asthma     On record        PAST SURGICAL HISTORY:   Past Surgical History:   Procedure Laterality Date     COLONOSCOPY  1/24/2014    Procedure: COLONOSCOPY;  Colonoscopy;  Surgeon: Jose Martin Dye MD;  Location: [facility] LIGATE FALLOPIAN TUBE  1970's        MEDICATIONS:   Current Outpatient Medications:      amoxicillin (AMOXIL) 500 MG capsule, TAKE ONE CAPSULE BY MOUTH EVERY 8 HOURS UNTIL GONE, Disp: , Rfl:      aspirin 81 MG tablet, Take 1 tablet (81 mg) by mouth daily, Disp: 90 tablet, Rfl: 3     budesonide-formoterol (SYMBICORT) 160-4.5 MCG/ACT Inhaler, Inhale 2 puffs into the lungs 2 times daily, Disp: 10.2 g, Rfl: 1     Cranberry 500 MG  CAPS, , Disp: , Rfl:      estradiol (ESTRACE) 0.1 MG/GM vaginal cream, Place 0.5 g vaginally three times a week, Disp: 42.5 g, Rfl: 1     Krill Oil Omega-3 500 MG CAPS, 2 daily, Disp: , Rfl:      montelukast (SINGULAIR) 10 MG tablet, TAKE ONE TABLET BY MOUTH AT BEDTIME WHEN ALLERGIES ARE PRESENT, Disp: 60 tablet, Rfl: 1     REBIF REBIDOSE 44 MCG/0.5ML SOAJ, Inject 44 mcg Subcutaneous three times a week, Disp: 6 mL, Rfl: 11     sertraline (ZOLOFT) 100 MG tablet, TAKE 1 & 1/2 TABLETS BY MOUTH ONCE DAILY, Disp: 135 tablet, Rfl: 0     ALLERGIES:    Allergies   Allergen Reactions     No Known Drug Allergy         SOCIAL HISTORY:   Social History     Socioeconomic History     Marital status:      Spouse name: Luis     Number of children: 0     Years of education: 12     Highest education level: Not on file   Occupational History     Occupation:      Employer: [employer]   Tobacco Use     Smoking status: Never     Smokeless tobacco: Former   Vaping Use     Vaping status: Never Used   Substance and Sexual Activity     Alcohol use: Not Currently     Comment: rarely.     Drug use: No     Sexual activity: Yes     Partners: Male     Birth control/protection: Surgical, Female Surgical     Comment: Hx: Tubal Ligation   Other Topics Concern      Service No     Blood Transfusions No     Caffeine Concern Yes     Comment: coffee/tea: 2c/d     Occupational Exposure No     Hobby Hazards No     Sleep Concern No     Stress Concern No     Weight Concern No     Special Diet Yes     Comment: Eat  Healthy     Back Care Yes     Comment: Hx: chiropractor and PT     Exercise Yes     Comment: Yoga and swimming 2/wk     Bike Helmet No     Comment: N/A     Seat Belt Yes     Self-Exams Yes     Comment: Monthly     Parent/sibling w/ CABG, MI or angioplasty before 65F 55M? No   Social History Narrative     Not on file     Social Determinants of Health     Financial Resource Strain: Low Risk  (7/25/2024)     Financial Resource Strain      Within the past 12 months, have you or your family members you live with been unable to get utilities (heat, electricity) when it was really needed?: No   Food Insecurity: Low Risk  (7/25/2024)    Food Insecurity      Within the past 12 months, did you worry that your food would run out before you got money to buy more?: No      Within the past 12 months, did the food you bought just not last and you didn t have money to get more?: No   Transportation Needs: Low Risk  (7/25/2024)    Transportation Needs      Within the past 12 months, has lack of transportation kept you from medical appointments, getting your medicines, non-medical meetings or appointments, work, or from getting things that you need?: No   Physical Activity: Patient Declined (7/25/2024)    Exercise Vital Sign      Days of Exercise per Week: Patient declined      Minutes of Exercise per Session: Patient declined   Stress: No Stress Concern Present (7/25/2024)    [employer] - Occupational Stress Questionnaire      Feeling of Stress : Not at all   Social Connections: Unknown (7/25/2024)    Social Connection and Isolation Panel [NHANES]      Frequency of Communication with Friends and Family: Not on file      Frequency of Social Gatherings with Friends and Family: Patient declined      Attends Congregation Services: Not on file      Active Member of Clubs or Organizations: Not on file      Attends Club or Organization Meetings: Not on file      Marital Status: Not on file   Interpersonal Safety: Low Risk  (7/26/2024)    Interpersonal Safety      Do you feel physically and emotionally safe where you currently live?: Yes      Within the past 12 months, have you been hit, slapped, kicked or otherwise physically hurt by someone?: No      Within the past 12 months, have you been humiliated or emotionally abused in other ways by your partner or ex-partner?: No   Housing Stability: Low Risk  (7/25/2024)  "   Housing Stability      Do you have housing? : Yes      Are you worried about losing your housing?: No        FAMILY HISTORY:   Family History   Problem Relation Age of Onset     Cancer Father          at age 70     Lipids Father      Hypertension Father      Diabetes Father         Adult Onset     Other Cancer Father      Heart Disease Mother         low blood pressure, passed away 2004 lung cancer     Cancer Mother         Lung     Diabetes Mother         Adult Onset     Hyperlipidemia Mother      Respiratory Sister         uses inhalers     Cancer Sister         Lung     Heart Disease Sister         Hx Rhumatic fever     Glaucoma No family hx of      Macular Degeneration No family hx of      EXAM:Vitals: /86 (BP Location: Left arm, Patient Position: Sitting, Cuff Size: Adult Regular)   Ht 1.676 m (5' 6\")   Wt 79.8 kg (176 lb)   LMP 2008   BMI 28.41 kg/m    BMI= Body mass index is 28.41 kg/m .    General appearance: Patient is alert and fully cooperative with history & exam.  No sign of distress is noted during the visit.     Psychiatric: Affect is pleasant & appropriate.  Patient appears motivated to improve health.     Respiratory: Breathing is regular & unlabored while sitting.     HEENT: Hearing is intact to spoken word.  Speech is clear.  No gross evidence of visual impairment that would impact ambulation.     Vascular: DP & PT pulses are intact & regular bilaterally.  No significant edema or varicosities noted.  CFT and skin temperature is normal to both lower extremities.     Neurologic: Lower extremity sensation is intact to light touch.  No evidence of weakness or contracture in the lower extremities.  No evidence of neuropathy.    Dermatologic: Skin is intact to both lower extremities with adequate texture, turgor and tone about the integument.  First and second toenails are somewhat thickened on the distal aspect and poorly attached they are forming pincer nails and are somewhat " dystrophic.  No pitting of the toenails.  No maceration or petechia noted.  No fissuring.    Musculoskeletal: Patient is ambulatory without assistive device or brace.  Mild generalized valgus noted bilateral.  There is subtle crepitus and limited range of motion generally throughout the midfoot.  Manual muscle strength was 5/5 to all 4 quadrants.     ASSESSMENT:       ICD-10-CM    1. Pes valgus  Q66.6       2. Onychodystrophy  L60.3       3. Multiple sclerosis (H)  G35           PLAN:  Reviewed patient's chart in [facility].      8/22/2024   Obtained and interpreted radiographs of both feet.  She originally stated that she is not sure why we requested radiographs however her complaint in the appointment notes was bilateral foot pain.  She then stated she did not want to be billed for the radiographs of both feet.  She then asked what the x-rays demonstrated and what I saw on the x-rays.  She had questions if there are any deformities or how much arthritis was present.  All of these questions were answered.    Also discussed onychodystrophy and pincer nails.  Her toenails are changed from time and age and minor repetitive trauma.  This is not caused from fungus.  Recommended shaping the nail with debridement abrasive debridement and moisturizing.  Also discussed permanent matrixectomy if she would like.  Discussed postoperative cares.  All questions were answered.  Follow-up as needed.      Quentin Paez DPM          Again, thank you for allowing me to participate in the care of your patient.        Sincerely,        Quentin Paez DPM

## 2024-08-22 NOTE — PROGRESS NOTES
HPI:  Cecile Mays is a 68 year old female who is seen in consultation at the request of self.    Pt presents for eval of:   (Onset, Location, L/R, Character, Treatments, Injury if yes)    XR Left and Right foot 8/22/2024     Onset May 2024, rolled a table over Right toe and June 2024 over Left toes. Toenails are discolored.  She also has questions about the condition of her feet as they sometimes hurt.    Retired.      ROS:  10 point ROS neg other than the symptoms noted above in the HPI.    Patient Active Problem List   Diagnosis    Urge incontinence    Allergic rhinitis    Hyperlipidemia LDL goal <130    Gastroesophageal reflux disease with esophagitis    Adjustment disorder with anxious mood    Sacroiliac joint dysfunction    Multiple sclerosis (H)    Lumbago    Major depressive disorder, recurrent episode, mild (H24)    Vision changes       PAST MEDICAL HISTORY:   Past Medical History:   Diagnosis Date    Allergy     Depressive disorder     High cholesterol     Uncomplicated asthma     On record        PAST SURGICAL HISTORY:   Past Surgical History:   Procedure Laterality Date    COLONOSCOPY  1/24/2014    Procedure: COLONOSCOPY;  Colonoscopy;  Surgeon: Jose Martin Dye MD;  Location: [facility]  1970's        MEDICATIONS:   Current Outpatient Medications:     amoxicillin (AMOXIL) 500 MG capsule, TAKE ONE CAPSULE BY MOUTH EVERY 8 HOURS UNTIL GONE, Disp: , Rfl:     aspirin 81 MG tablet, Take 1 tablet (81 mg) by mouth daily, Disp: 90 tablet, Rfl: 3    budesonide-formoterol (SYMBICORT) 160-4.5 MCG/ACT Inhaler, Inhale 2 puffs into the lungs 2 times daily, Disp: 10.2 g, Rfl: 1    Cranberry 500 MG CAPS, , Disp: , Rfl:     estradiol (ESTRACE) 0.1 MG/GM vaginal cream, Place 0.5 g vaginally three times a week, Disp: 42.5 g, Rfl: 1    Krill Oil Omega-3 500 MG CAPS, 2 daily, Disp: , Rfl:     montelukast (SINGULAIR) 10 MG tablet, TAKE ONE TABLET BY MOUTH AT BEDTIME WHEN  ALLERGIES ARE PRESENT, Disp: 60 tablet, Rfl: 1    REBIF REBIDOSE 44 MCG/0.5ML SOAJ, Inject 44 mcg Subcutaneous three times a week, Disp: 6 mL, Rfl: 11    sertraline (ZOLOFT) 100 MG tablet, TAKE 1 & 1/2 TABLETS BY MOUTH ONCE DAILY, Disp: 135 tablet, Rfl: 0     ALLERGIES:    Allergies   Allergen Reactions    No Known Drug Allergy         SOCIAL HISTORY:   Social History     Socioeconomic History    Marital status:      Spouse name: Luis    Number of children: 0    Years of education: 12    Highest education level: Not on file   Occupational History    Occupation:      Employer: [employer]   Tobacco Use    Smoking status: Never    Smokeless tobacco: Former   Vaping Use    Vaping status: Never Used   Substance and Sexual Activity    Alcohol use: Not Currently     Comment: rarely.    Drug use: No    Sexual activity: Yes     Partners: Male     Birth control/protection: Surgical, Female Surgical     Comment: Hx: Tubal Ligation   Other Topics Concern     Service No    Blood Transfusions No    Caffeine Concern Yes     Comment: coffee/tea: 2c/d    Occupational Exposure No    Hobby Hazards No    Sleep Concern No    Stress Concern No    Weight Concern No    Special Diet Yes     Comment: Eat  Healthy    Back Care Yes     Comment: Hx: chiropractor and PT    Exercise Yes     Comment: Yoga and swimming 2/wk    Bike Helmet No     Comment: N/A    Seat Belt Yes    Self-Exams Yes     Comment: Monthly    Parent/sibling w/ CABG, MI or angioplasty before 65F 55M? No   Social History Narrative    Not on file     Social Determinants of Health     Financial Resource Strain: Low Risk  (7/25/2024)    Financial Resource Strain     Within the past 12 months, have you or your family members you live with been unable to get utilities (heat, electricity) when it was really needed?: No   Food Insecurity: Low Risk  (7/25/2024)    Food Insecurity     Within the past 12 months, did you worry that your food would  run out before you got money to buy more?: No     Within the past 12 months, did the food you bought just not last and you didn t have money to get more?: No   Transportation Needs: Low Risk  (2024)    Transportation Needs     Within the past 12 months, has lack of transportation kept you from medical appointments, getting your medicines, non-medical meetings or appointments, work, or from getting things that you need?: No   Physical Activity: Patient Declined (2024)    Exercise Vital Sign     Days of Exercise per Week: Patient declined     Minutes of Exercise per Session: Patient declined   Stress: No Stress Concern Present (2024)    [employer] [place] [employer] - Occupational Stress Questionnaire     Feeling of Stress : Not at all   Social Connections: Unknown (2024)    Social Connection and Isolation Panel [NHANES]     Frequency of Communication with Friends and Family: Not on file     Frequency of Social Gatherings with Friends and Family: Patient declined     Attends Tenriism Services: Not on file     Active Member of Clubs or Organizations: Not on file     Attends Club or Organization Meetings: Not on file     Marital Status: Not on file   Interpersonal Safety: Low Risk  (2024)    Interpersonal Safety     Do you feel physically and emotionally safe where you currently live?: Yes     Within the past 12 months, have you been hit, slapped, kicked or otherwise physically hurt by someone?: No     Within the past 12 months, have you been humiliated or emotionally abused in other ways by your partner or ex-partner?: No   Housing Stability: Low Risk  (2024)    Housing Stability     Do you have housing? : Yes     Are you worried about losing your housing?: No        FAMILY HISTORY:   Family History   Problem Relation Age of Onset    Cancer Father          at age 70    Lipids Father     Hypertension Father     Diabetes Father         Adult Onset    Other Cancer Father      "Heart Disease Mother         low blood pressure, passed away 1/2004 lung cancer    Cancer Mother         Lung    Diabetes Mother         Adult Onset    Hyperlipidemia Mother     Respiratory Sister         uses inhalers    Cancer Sister         Lung    Heart Disease Sister         Hx Rhumatic fever    Glaucoma No family hx of     Macular Degeneration No family hx of      EXAM:Vitals: /86 (BP Location: Left arm, Patient Position: Sitting, Cuff Size: Adult Regular)   Ht 1.676 m (5' 6\")   Wt 79.8 kg (176 lb)   LMP 02/05/2008   BMI 28.41 kg/m    BMI= Body mass index is 28.41 kg/m .    General appearance: Patient is alert and fully cooperative with history & exam.  No sign of distress is noted during the visit.     Psychiatric: Affect is pleasant & appropriate.  Patient appears motivated to improve health.     Respiratory: Breathing is regular & unlabored while sitting.     HEENT: Hearing is intact to spoken word.  Speech is clear.  No gross evidence of visual impairment that would impact ambulation.     Vascular: DP & PT pulses are intact & regular bilaterally.  No significant edema or varicosities noted.  CFT and skin temperature is normal to both lower extremities.     Neurologic: Lower extremity sensation is intact to light touch.  No evidence of weakness or contracture in the lower extremities.  No evidence of neuropathy.    Dermatologic: Skin is intact to both lower extremities with adequate texture, turgor and tone about the integument.  First and second toenails are somewhat thickened on the distal aspect and poorly attached they are forming pincer nails and are somewhat dystrophic.  No pitting of the toenails.  No maceration or petechia noted.  No fissuring.    Musculoskeletal: Patient is ambulatory without assistive device or brace.  Mild generalized valgus noted bilateral.  There is subtle crepitus and limited range of motion generally throughout the midfoot.  Manual muscle strength was 5/5 to all 4 " quadrants.     ASSESSMENT:       ICD-10-CM    1. Pes valgus  Q66.6       2. Onychodystrophy  L60.3       3. Multiple sclerosis (H)  G35           PLAN:  Reviewed patient[facility] in [facility].      8/22/2024   Obtained and interpreted radiographs of both feet.  She originally stated that she is not sure why we requested radiographs however her complaint in the appointment notes was bilateral foot pain.  She then stated she did not want to be billed for the radiographs of both feet.  She then asked what the x-rays demonstrated and what I saw on the x-rays.  She had questions if there are any deformities or how much arthritis was present.  All of these questions were answered.    Also discussed onychodystrophy and pincer nails.  Her toenails are changed from time and age and minor repetitive trauma.  This is not caused from fungus.  Recommended shaping the nail with debridement abrasive debridement and moisturizing.  Also discussed permanent matrixectomy if she would like.  Discussed postoperative cares.  All questions were answered.  Follow-up as needed.      Quentin Paez DPM

## 2024-08-27 NOTE — TELEPHONE ENCOUNTER
Split prep please. She was advised the golytely, and I would advise the split prep for her. thanks

## 2024-08-28 DIAGNOSIS — Z29.11 NEED FOR VACCINATION AGAINST RESPIRATORY SYNCYTIAL VIRUS: ICD-10-CM

## 2024-08-29 ENCOUNTER — TELEPHONE (OUTPATIENT)
Dept: GASTROENTEROLOGY | Facility: CLINIC | Age: 69
End: 2024-08-29
Payer: MEDICARE

## 2024-08-29 NOTE — TELEPHONE ENCOUNTER
Caller: Pt    Reason for Reschedule/Cancellation   (please be detailed, any staff messages or encounters to note?): Will CB to reschedule      Prior to reschedule please review:  Ordering Provider: Valdez Hurd MD   Sedation Determined: MAC  Does patient have any ASC Exclusions, please identify?: No      Notes on Cancelled Procedure:  Procedure: Lower Endoscopy [Colonoscopy]   Date: 9.3.24  Location: [facility]; [address] [place] [address] , [place] [address]  Surgeon: Long      Rescheduled: No, Will CB to reschedule       Did you cancel or rescheduled an EUS procedure? No.

## 2024-08-30 RX ORDER — BUDESONIDE AND FORMOTEROL FUMARATE DIHYDRATE 160; 4.5 UG/1; UG/1
2 AEROSOL RESPIRATORY (INHALATION) 2 TIMES DAILY
Qty: 11 G | Refills: 0 | Status: SHIPPED | OUTPATIENT
Start: 2024-08-30

## 2024-11-11 ENCOUNTER — MYC MEDICAL ADVICE (OUTPATIENT)
Dept: UROLOGY | Facility: CLINIC | Age: 69
End: 2024-11-11
Payer: MEDICARE

## 2024-11-11 DIAGNOSIS — N32.81 OAB (OVERACTIVE BLADDER): ICD-10-CM

## 2024-11-11 RX ORDER — LEVOFLOXACIN 250 MG/1
250 TABLET, FILM COATED ORAL DAILY
Qty: 3 TABLET | Refills: 0 | Status: SHIPPED | OUTPATIENT
Start: 2024-11-11

## 2024-11-11 NOTE — TELEPHONE ENCOUNTER
Per previous visit notes patient has been bringing Levoquin in to appointments to take at the time of Botox injections. Prescription was refilled for patient prior to visit with Dr. Ibrahim tomorrow.     Enid Meraz RN   [facility]

## 2024-11-12 ENCOUNTER — OFFICE VISIT (OUTPATIENT)
Dept: UROLOGY | Facility: CLINIC | Age: 69
End: 2024-11-12
Payer: MEDICARE

## 2024-11-12 VITALS
BODY MASS INDEX: 28.41 KG/M2 | SYSTOLIC BLOOD PRESSURE: 140 MMHG | TEMPERATURE: 97.3 F | WEIGHT: 176 LBS | DIASTOLIC BLOOD PRESSURE: 88 MMHG | HEART RATE: 66 BPM

## 2024-11-12 DIAGNOSIS — N39.41 URGE INCONTINENCE: Primary | ICD-10-CM

## 2024-11-12 LAB
ALBUMIN UR-MCNC: NEGATIVE MG/DL
APPEARANCE UR: ABNORMAL
BACTERIA #/AREA URNS HPF: ABNORMAL /HPF
BILIRUB UR QL STRIP: NEGATIVE
COLOR UR AUTO: YELLOW
GLUCOSE UR STRIP-MCNC: NEGATIVE MG/DL
HGB UR QL STRIP: NEGATIVE
KETONES UR STRIP-MCNC: NEGATIVE MG/DL
LEUKOCYTE ESTERASE UR QL STRIP: ABNORMAL
MUCOUS THREADS #/AREA URNS LPF: PRESENT /LPF
NITRATE UR QL: POSITIVE
PH UR STRIP: 6 [PH] (ref 5–7)
RBC URINE: 0 /HPF
SP GR UR STRIP: 1.01 (ref 1–1.03)
SQUAMOUS EPITHELIAL: 1 /HPF
UROBILINOGEN UR STRIP-MCNC: NORMAL MG/DL
WBC URINE: 45 /HPF

## 2024-11-12 PROCEDURE — 81001 URINALYSIS AUTO W/SCOPE: CPT | Performed by: UROLOGY

## 2024-11-12 PROCEDURE — 52287 CYSTOSCOPY CHEMODENERVATION: CPT | Performed by: UROLOGY

## 2024-11-12 ASSESSMENT — PAIN SCALES - GENERAL: PAINLEVEL_OUTOF10: NO PAIN (0)

## 2024-11-12 NOTE — PROGRESS NOTES
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  Informed consent obtained.    Botox A 100 units in 5 ml saline injected into single site in posterior wall; excellent bleb and hemostasis.        Current Outpatient Medications   Medication Sig Dispense Refill    amoxicillin (AMOXIL) 500 MG capsule TAKE ONE CAPSULE BY MOUTH EVERY 8 HOURS UNTIL GONE      aspirin 81 MG tablet Take 1 tablet (81 mg) by mouth daily 90 tablet 3    Cranberry 500 MG CAPS       estradiol (ESTRACE) 0.1 MG/GM vaginal cream Place 0.5 g vaginally three times a week 42.5 g 1    Krill Oil Omega-3 500 MG CAPS 2 daily      levofloxacin (LEVAQUIN) 250 MG tablet Take 1 tablet (250 mg) by mouth daily. 3 tablet 0    montelukast (SINGULAIR) 10 MG tablet TAKE ONE TABLET BY MOUTH AT BEDTIME WHEN ALLERGIES ARE PRESENT 60 tablet 1    REBIF REBIDOSE 44 MCG/0.5ML SOAJ Inject 44 mcg Subcutaneous three times a week 6 mL 11    sertraline (ZOLOFT) 100 MG tablet TAKE 1 & 1/2 TABLETS BY MOUTH ONCE DAILY 135 tablet 0    SYMBICORT 160-4.5 MCG/ACT Inhaler Inhale 2 puffs by mouth twice daily 11 g 0     No current facility-administered medications for this visit.         IMP:  Botox for neurogenic bladder      PLAN:  1. Discussed situation with patient in detail.  2. Precautions given in detail  3. Levoquin x 1  4. F/up with virtual visit

## 2024-11-17 NOTE — PROGRESS NOTES
"Referral source: Established patient    Chief complaint: Multiple sclerosis    History of the Present Illness: Ms. Cecile Mays is a 68 year old right-handed woman who presents to the [facility] today for a scheduled follow up visit regarding her diagnosis of multiple sclerosis.    The patient's history is as per my previous notes.  She initially developed symptoms of demyelinating disease in 2016 when she had an episode of left facial numbness. MRI and CSF studies were suggestive of demyelinating disease of the type seen in multiple sclerosis. Subsequently she was formally diagnosed with multiple sclerosis based on radiologic accumulation of lesions on MRI when she was on disease-modifying treatment with glatiramer acetate.  She was then switched to the Rebif formulation of interferon beta in 2017, and remains on that medication up until the present time.    Today, she denies any new episodic changes in vision, balance, strength, or sensation suggestive of new relapse of multiple sclerosis since she was last seen in this clinic.    She describes her overall status as \"same as I was a year ago\".    She relates that her balance seems \"a little more off\" when she is tired.  She tends to drift toward the left.  Fortunately, she denies any falls.    She began botulinum toxin injections for urinary urgency in May, and reports that this has been helping.    PHYSICAL EXAMINATION:  VITAL SIGNS: Blood pressure 120/80; pulse 70; weight 77.1 kg.  GENERAL: Well-nourished woman who presents to the examination alone, awake and alert and in no acute distress.    NEUROLOGIC EXAMINATION:  CRANIAL NERVES: Visual fields are full to confrontation.  Extraocular movements are intact with no internuclear ophthalmoplegia.  Facial strength is normal.  Palate elevation and tongue protrusion are normal.  POWER: Strength is within normal limits in proximal and distal muscles in the upper and lower limbs " throughout.  REFLEXES: Reflexes are symmetric and within normal limits in the arms and legs.  MOTOR/CEREBELLAR: There is no appendicular ataxia on finger-to-nose testing.  Rapid alternating movements are within normal limits in the hands and fingers.  There is no pronator drift in the arms.  GAIT: The patient is able to ambulate on a flat, level surface with no gross loss of postural stability, and able to walk on heels and toes.  Tandem gait is mildly impaired for age.    Assessment/plan:    1. Multiple sclerosis  The patient is clinically stable as regards any evidence of active inflammatory demyelination on current disease modifying therapy with Rebif, which she will continue.      Today, I will obtain routine laboratory studies for monitoring of this medication to include complete blood counts with differential and hepatic panel.    I will see her back in 1 year for a review, or sooner if she has any new symptoms that are concerning to her.    2. Gait disturbance  At present, she is compensating adequately for relatively mild gait instability.  We will continue to keep an eye on this symptom over time.    3.  Urinary urgency  This is being managed with botulinum toxin injections by her urologist, Dr. Nato Ibrahim.

## 2024-11-23 DIAGNOSIS — F33.0 MAJOR DEPRESSIVE DISORDER, RECURRENT EPISODE, MILD (H): ICD-10-CM

## 2024-11-25 DIAGNOSIS — Z29.11 NEED FOR VACCINATION AGAINST RESPIRATORY SYNCYTIAL VIRUS: ICD-10-CM

## 2024-11-25 RX ORDER — SERTRALINE HYDROCHLORIDE 100 MG/1
TABLET, FILM COATED ORAL
Qty: 135 TABLET | Refills: 1 | Status: SHIPPED | OUTPATIENT
Start: 2024-11-25

## 2024-11-26 RX ORDER — BUDESONIDE AND FORMOTEROL FUMARATE DIHYDRATE 160; 4.5 UG/1; UG/1
2 AEROSOL RESPIRATORY (INHALATION) 2 TIMES DAILY
Qty: 11 G | Refills: 0 | Status: SHIPPED | OUTPATIENT
Start: 2024-11-26

## 2024-11-27 ENCOUNTER — TELEPHONE (OUTPATIENT)
Dept: FAMILY MEDICINE | Facility: CLINIC | Age: 69
End: 2024-11-27
Payer: MEDICARE

## 2024-11-27 NOTE — TELEPHONE ENCOUNTER
Prior Authorization Retail Medication Request    Medication/Dose: SYMBICORT 160-4.5 MCG/ACT Inhaler  Diagnosis and ICD code (if different than what is on RX):  Need for vaccination against respiratory syncytial virus [Z29.11]   New/renewal/insurance change PA/secondary ins. PA:  Previously Tried and Failed:    Rationale:      Insurance   Primary:     Medicare     Insurance ID:  [id number]     Secondary (if applicable):    [employer] [place] Medicare Supp*     Insurance ID:  [id number]     Pharmacy Information (if different than what is on RX)  Name: [facility]    Phone:  [phone]   Fax:[phone]     Clinic Information  Preferred routing pool for dept communication: [facility] [place]

## 2024-12-02 NOTE — TELEPHONE ENCOUNTER
PA Initiation    Medication: SYMBICORT 160-4.5 MCG/ACT IN AERO  Insurance Company: [employer] Clinical Review - Phone [phone] Fax [phone]  Pharmacy Filling the Rx: [facility] - [place], [place] - [address]  Filling Pharmacy Phone: [phone]  Filling Pharmacy Fax: [phone]  Start Date: 12/2/2024

## 2024-12-03 ENCOUNTER — VIRTUAL VISIT (OUTPATIENT)
Dept: UROLOGY | Facility: CLINIC | Age: 69
End: 2024-12-03
Payer: MEDICARE

## 2024-12-03 VITALS — BODY MASS INDEX: 27.44 KG/M2 | WEIGHT: 170 LBS

## 2024-12-03 DIAGNOSIS — N31.9 NEUROGENIC BLADDER: Primary | ICD-10-CM

## 2024-12-03 PROCEDURE — 99441 PR PHYSICIAN TELEPHONE EVALUATION 5-10 MIN: CPT | Mod: 93 | Performed by: UROLOGY

## 2024-12-03 NOTE — TELEPHONE ENCOUNTER
PRIOR AUTHORIZATION DENIED    Medication: SYMBICORT 160-4.5 MCG/ACT IN AERO    Insurance Company: [email or web address] Clinical Review - Phone [phone] Fax [phone]    Denial Date: 12/2/2024    Denial Reason(s): Patient needs to try and fail 1 more preferred medication:         Appeal Information:

## 2024-12-03 NOTE — PROGRESS NOTES
F/u mixed neurogenic bladder with mixed UI, MS, atrophic vaginitis (Estrace), now on Botox        5/2/23              Botox #1  11/21/23 Botox #2  5/7/24              Botox #3 with 100 units  11/12/24 Botox #4 with 100 units        Pt now with improved bladder control, does not wear pad day or nite. Occasional urge incontinence.      Denies dysuria, gross hematuria, daytime frequency; noc x 3.    Drinks 2 tea, 2-4 [place] per day.    BM's are satisfactory          Current Facility-Administered Medications          Current Outpatient Medications   Medication Sig Dispense Refill    amoxicillin (AMOXIL) 500 MG capsule TAKE ONE CAPSULE BY MOUTH EVERY 8 HOURS UNTIL GONE        aspirin 81 MG tablet Take 1 tablet (81 mg) by mouth daily 90 tablet 3    Cranberry 500 MG CAPS          estradiol (ESTRACE) 0.1 MG/GM vaginal cream Place 0.5 g vaginally three times a week 42.5 g 1    Krill Oil Omega-3 500 MG CAPS 2 daily        levofloxacin (LEVAQUIN) 250 MG tablet Take 1 tablet (250 mg) by mouth daily. 3 tablet 0    montelukast (SINGULAIR) 10 MG tablet TAKE ONE TABLET BY MOUTH AT BEDTIME WHEN ALLERGIES ARE PRESENT 60 tablet 1    REBIF REBIDOSE 44 MCG/0.5ML SOAJ Inject 44 mcg Subcutaneous three times a week 6 mL 11    sertraline (ZOLOFT) 100 MG tablet TAKE 1 & 1/2 TABLETS BY MOUTH ONCE DAILY 135 tablet 0    SYMBICORT 160-4.5 MCG/ACT Inhaler Inhale 2 puffs by mouth twice daily 11 g 0      No current facility-administered medications for this visit.              IMP:  Neurogenic bladder, doing well with botox      PLAN:  Discussed situation with patient in detail.  RTC 6 mos for next Botox or sooner prn  Total time spent in reviewing patient's previous records, discussion with patient and documentation: 20 minutes

## 2024-12-09 NOTE — TELEPHONE ENCOUNTER
Called and spoke with pharmacy, gave verbal to fill generic Symbicort at request of Dr. Hurd.     Kwame Foster RN on 12/9/2024 at 10:05 AM

## 2024-12-13 ENCOUNTER — HOSPITAL ENCOUNTER (EMERGENCY)
Facility: CLINIC | Age: 69
Discharge: HOME | End: 2024-12-13
Attending: STUDENT IN AN ORGANIZED HEALTH CARE EDUCATION/TRAINING PROGRAM | Admitting: STUDENT IN AN ORGANIZED HEALTH CARE EDUCATION/TRAINING PROGRAM
Payer: MEDICARE

## 2024-12-13 ENCOUNTER — APPOINTMENT (OUTPATIENT)
Dept: GENERAL RADIOLOGY | Facility: CLINIC | Age: 69
End: 2024-12-13
Attending: STUDENT IN AN ORGANIZED HEALTH CARE EDUCATION/TRAINING PROGRAM
Payer: MEDICARE

## 2024-12-13 VITALS
WEIGHT: 170 LBS | OXYGEN SATURATION: 95 % | DIASTOLIC BLOOD PRESSURE: 87 MMHG | HEIGHT: 66 IN | HEART RATE: 78 BPM | RESPIRATION RATE: 18 BRPM | BODY MASS INDEX: 27.32 KG/M2 | SYSTOLIC BLOOD PRESSURE: 111 MMHG | TEMPERATURE: 98.3 F

## 2024-12-13 DIAGNOSIS — R05.1 ACUTE COUGH: ICD-10-CM

## 2024-12-13 DIAGNOSIS — E86.0 DEHYDRATION: ICD-10-CM

## 2024-12-13 DIAGNOSIS — B34.9 VIRAL INFECTION: ICD-10-CM

## 2024-12-13 LAB
ALBUMIN SERPL BCG-MCNC: 4.1 G/DL (ref 3.5–5.2)
ALP SERPL-CCNC: 127 U/L (ref 40–150)
ALT SERPL W P-5'-P-CCNC: 20 U/L (ref 0–50)
ANION GAP SERPL CALCULATED.3IONS-SCNC: 17 MMOL/L (ref 7–15)
AST SERPL W P-5'-P-CCNC: 18 U/L (ref 0–45)
ATRIAL RATE - MUSE: 68 BPM
BASOPHILS # BLD AUTO: 0 10E3/UL (ref 0–0.2)
BASOPHILS NFR BLD AUTO: 1 %
BILIRUB SERPL-MCNC: 0.5 MG/DL
BUN SERPL-MCNC: 12.5 MG/DL (ref 8–23)
CALCIUM SERPL-MCNC: 9.2 MG/DL (ref 8.8–10.4)
CHLORIDE SERPL-SCNC: 101 MMOL/L (ref 98–107)
CREAT SERPL-MCNC: 0.82 MG/DL (ref 0.51–0.95)
DIASTOLIC BLOOD PRESSURE - MUSE: NORMAL MMHG
EGFRCR SERPLBLD CKD-EPI 2021: 77 ML/MIN/1.73M2
EOSINOPHIL # BLD AUTO: 0.1 10E3/UL (ref 0–0.7)
EOSINOPHIL NFR BLD AUTO: 1 %
ERYTHROCYTE [DISTWIDTH] IN BLOOD BY AUTOMATED COUNT: 12.3 % (ref 10–15)
FLUAV RNA SPEC QL NAA+PROBE: NEGATIVE
FLUBV RNA RESP QL NAA+PROBE: NEGATIVE
GLUCOSE SERPL-MCNC: 111 MG/DL (ref 70–99)
HCO3 SERPL-SCNC: 21 MMOL/L (ref 22–29)
HCT VFR BLD AUTO: 41.1 % (ref 35–47)
HGB BLD-MCNC: 14.2 G/DL (ref 11.7–15.7)
IMM GRANULOCYTES # BLD: 0 10E3/UL
IMM GRANULOCYTES NFR BLD: 0 %
INTERPRETATION ECG - MUSE: NORMAL
LYMPHOCYTES # BLD AUTO: 1.6 10E3/UL (ref 0.8–5.3)
LYMPHOCYTES NFR BLD AUTO: 25 %
MCH RBC QN AUTO: 29.8 PG (ref 26.5–33)
MCHC RBC AUTO-ENTMCNC: 34.5 G/DL (ref 31.5–36.5)
MCV RBC AUTO: 86 FL (ref 78–100)
MONOCYTES # BLD AUTO: 0.5 10E3/UL (ref 0–1.3)
MONOCYTES NFR BLD AUTO: 7 %
NEUTROPHILS # BLD AUTO: 4.3 10E3/UL (ref 1.6–8.3)
NEUTROPHILS NFR BLD AUTO: 67 %
NRBC # BLD AUTO: 0 10E3/UL
NRBC BLD AUTO-RTO: 0 /100
P AXIS - MUSE: 62 DEGREES
PLATELET # BLD AUTO: 238 10E3/UL (ref 150–450)
POTASSIUM SERPL-SCNC: 3.1 MMOL/L (ref 3.4–5.3)
PR INTERVAL - MUSE: 164 MS
PROCALCITONIN SERPL IA-MCNC: 0.09 NG/ML
PROT SERPL-MCNC: 6.9 G/DL (ref 6.4–8.3)
QRS DURATION - MUSE: 84 MS
QT - MUSE: 426 MS
QTC - MUSE: 452 MS
R AXIS - MUSE: 43 DEGREES
RBC # BLD AUTO: 4.77 10E6/UL (ref 3.8–5.2)
RSV RNA SPEC NAA+PROBE: NEGATIVE
SARS-COV-2 RNA RESP QL NAA+PROBE: NEGATIVE
SODIUM SERPL-SCNC: 139 MMOL/L (ref 135–145)
SYSTOLIC BLOOD PRESSURE - MUSE: NORMAL MMHG
T AXIS - MUSE: 72 DEGREES
TROPONIN T SERPL HS-MCNC: 7 NG/L
VENTRICULAR RATE- MUSE: 68 BPM
WBC # BLD AUTO: 6.5 10E3/UL (ref 4–11)

## 2024-12-13 PROCEDURE — 93010 ELECTROCARDIOGRAM REPORT: CPT | Performed by: STUDENT IN AN ORGANIZED HEALTH CARE EDUCATION/TRAINING PROGRAM

## 2024-12-13 PROCEDURE — 99285 EMERGENCY DEPT VISIT HI MDM: CPT | Mod: 25 | Performed by: STUDENT IN AN ORGANIZED HEALTH CARE EDUCATION/TRAINING PROGRAM

## 2024-12-13 PROCEDURE — 258N000003 HC RX IP 258 OP 636: Performed by: STUDENT IN AN ORGANIZED HEALTH CARE EDUCATION/TRAINING PROGRAM

## 2024-12-13 PROCEDURE — 250N000009 HC RX 250: Performed by: STUDENT IN AN ORGANIZED HEALTH CARE EDUCATION/TRAINING PROGRAM

## 2024-12-13 PROCEDURE — 71045 X-RAY EXAM CHEST 1 VIEW: CPT

## 2024-12-13 PROCEDURE — 96374 THER/PROPH/DIAG INJ IV PUSH: CPT | Performed by: STUDENT IN AN ORGANIZED HEALTH CARE EDUCATION/TRAINING PROGRAM

## 2024-12-13 PROCEDURE — 84145 PROCALCITONIN (PCT): CPT | Performed by: STUDENT IN AN ORGANIZED HEALTH CARE EDUCATION/TRAINING PROGRAM

## 2024-12-13 PROCEDURE — 84484 ASSAY OF TROPONIN QUANT: CPT | Performed by: STUDENT IN AN ORGANIZED HEALTH CARE EDUCATION/TRAINING PROGRAM

## 2024-12-13 PROCEDURE — 99284 EMERGENCY DEPT VISIT MOD MDM: CPT | Mod: 25 | Performed by: STUDENT IN AN ORGANIZED HEALTH CARE EDUCATION/TRAINING PROGRAM

## 2024-12-13 PROCEDURE — 36415 COLL VENOUS BLD VENIPUNCTURE: CPT | Performed by: STUDENT IN AN ORGANIZED HEALTH CARE EDUCATION/TRAINING PROGRAM

## 2024-12-13 PROCEDURE — 250N000011 HC RX IP 250 OP 636: Performed by: STUDENT IN AN ORGANIZED HEALTH CARE EDUCATION/TRAINING PROGRAM

## 2024-12-13 PROCEDURE — 84155 ASSAY OF PROTEIN SERUM: CPT | Performed by: STUDENT IN AN ORGANIZED HEALTH CARE EDUCATION/TRAINING PROGRAM

## 2024-12-13 PROCEDURE — 93005 ELECTROCARDIOGRAM TRACING: CPT | Performed by: STUDENT IN AN ORGANIZED HEALTH CARE EDUCATION/TRAINING PROGRAM

## 2024-12-13 PROCEDURE — 96361 HYDRATE IV INFUSION ADD-ON: CPT | Performed by: STUDENT IN AN ORGANIZED HEALTH CARE EDUCATION/TRAINING PROGRAM

## 2024-12-13 PROCEDURE — 250N000013 HC RX MED GY IP 250 OP 250 PS 637: Performed by: STUDENT IN AN ORGANIZED HEALTH CARE EDUCATION/TRAINING PROGRAM

## 2024-12-13 PROCEDURE — 85004 AUTOMATED DIFF WBC COUNT: CPT | Performed by: STUDENT IN AN ORGANIZED HEALTH CARE EDUCATION/TRAINING PROGRAM

## 2024-12-13 PROCEDURE — 82374 ASSAY BLOOD CARBON DIOXIDE: CPT | Performed by: STUDENT IN AN ORGANIZED HEALTH CARE EDUCATION/TRAINING PROGRAM

## 2024-12-13 PROCEDURE — 94640 AIRWAY INHALATION TREATMENT: CPT | Performed by: STUDENT IN AN ORGANIZED HEALTH CARE EDUCATION/TRAINING PROGRAM

## 2024-12-13 PROCEDURE — 87637 SARSCOV2&INF A&B&RSV AMP PRB: CPT | Performed by: STUDENT IN AN ORGANIZED HEALTH CARE EDUCATION/TRAINING PROGRAM

## 2024-12-13 RX ORDER — POTASSIUM CHLORIDE 1500 MG/1
20 TABLET, EXTENDED RELEASE ORAL ONCE
Status: COMPLETED | OUTPATIENT
Start: 2024-12-13 | End: 2024-12-13

## 2024-12-13 RX ORDER — ONDANSETRON 4 MG/1
4 TABLET, ORALLY DISINTEGRATING ORAL EVERY 8 HOURS PRN
Qty: 12 TABLET | Refills: 0 | Status: SHIPPED | OUTPATIENT
Start: 2024-12-13

## 2024-12-13 RX ORDER — ONDANSETRON 2 MG/ML
4 INJECTION INTRAMUSCULAR; INTRAVENOUS ONCE
Status: COMPLETED | OUTPATIENT
Start: 2024-12-13 | End: 2024-12-13

## 2024-12-13 RX ORDER — IPRATROPIUM BROMIDE AND ALBUTEROL SULFATE 2.5; .5 MG/3ML; MG/3ML
3 SOLUTION RESPIRATORY (INHALATION) ONCE
Status: COMPLETED | OUTPATIENT
Start: 2024-12-13 | End: 2024-12-13

## 2024-12-13 RX ADMIN — IPRATROPIUM BROMIDE AND ALBUTEROL SULFATE 3 ML: .5; 3 SOLUTION RESPIRATORY (INHALATION) at 11:49

## 2024-12-13 RX ADMIN — SODIUM CHLORIDE 1000 ML: 9 INJECTION, SOLUTION INTRAVENOUS at 11:24

## 2024-12-13 RX ADMIN — ONDANSETRON 4 MG: 2 INJECTION, SOLUTION INTRAMUSCULAR; INTRAVENOUS at 11:25

## 2024-12-13 RX ADMIN — POTASSIUM CHLORIDE 20 MEQ: 1500 TABLET, EXTENDED RELEASE ORAL at 12:53

## 2024-12-13 ASSESSMENT — COLUMBIA-SUICIDE SEVERITY RATING SCALE - C-SSRS
6. HAVE YOU EVER DONE ANYTHING, STARTED TO DO ANYTHING, OR PREPARED TO DO ANYTHING TO END YOUR LIFE?: NO
2. HAVE YOU ACTUALLY HAD ANY THOUGHTS OF KILLING YOURSELF IN THE PAST MONTH?: NO
1. IN THE PAST MONTH, HAVE YOU WISHED YOU WERE DEAD OR WISHED YOU COULD GO TO SLEEP AND NOT WAKE UP?: NO

## 2024-12-13 ASSESSMENT — ACTIVITIES OF DAILY LIVING (ADL)
ADLS_ACUITY_SCORE: 41
ADLS_ACUITY_SCORE: 41

## 2024-12-13 NOTE — DISCHARGE INSTRUCTIONS
We are glad that you are feeling better.  Your constellation of symptoms today are most consistent with a viral syndrome.  Rest of your workup today is reassuring.  Come back to the ER if anything is worsening.  Otherwise call your primary doctor today to schedule a follow-up visit for next week to make sure you are still doing well.    Please call your primary doctor in the morning to discuss your ER visit, make sure you are doing well, and to follow up on any incidental findings. Please come back to the ER for re evaluation if you feel like things are getting worse or have other concerns.

## 2024-12-13 NOTE — ED TRIAGE NOTES
Pt reports that she was recently treated for pneumonia and was feeling better, but starting Wednesday she started experiencing nausea and vomiting and cough returned.      Triage Assessment (Adult)       Row Name 12/13/24 1027          Triage Assessment    Airway WDL WDL        Respiratory WDL    Respiratory WDL X;cough        Skin Circulation/Temperature WDL    Skin Circulation/Temperature WDL WDL        Cardiac WDL    Cardiac WDL WDL        Peripheral/Neurovascular WDL    Peripheral Neurovascular WDL WDL        Cognitive/Neuro/Behavioral WDL    Cognitive/Neuro/Behavioral WDL WDL

## 2024-12-13 NOTE — ED PROVIDER NOTES
History     Chief Complaint   Patient presents with    Nausea & Vomiting    Cough     HPI  Cecile Mays is a 69 year old female who presents to the emergency department for flulike symptoms over the past 5 to 6 days.  She notes many people around her have similar symptoms.  No one has been tested positive for anything in particular.  She has a cough, seems to be productive with cloudy and yellow sputum.  She does have underlying asthma and has been using her inhaler more.  She did visit urgent care a few days ago, no testing was done however she was given a course of prednisone, azithromycin, she felt better for 1 day and has had worsening symptoms over the past few days.  She is now experiencing nonbloody nonbilious emesis, trying to stay hydrated orally however has not been able to tolerate much oral hydration.  Does not remember her last bowel movement however does not note any particular diarrhea or abdominal pain.    Allergies:  Allergies   Allergen Reactions    No Known Drug Allergy        Problem List:    Patient Active Problem List    Diagnosis Date Noted    Vision changes 11/13/2020     Priority: Medium    Major depressive disorder, recurrent episode, mild (H) 10/31/2018     Priority: Medium    Lumbago 11/21/2016     Priority: Medium    Multiple sclerosis (H) 06/15/2016     Priority: Medium    Sacroiliac joint dysfunction 04/13/2016     Priority: Medium    Adjustment disorder with anxious mood 01/18/2016     Priority: Medium    Gastroesophageal reflux disease with esophagitis 12/21/2015     Priority: Medium    Hyperlipidemia LDL goal <130 12/02/2011     Priority: Medium    Allergic rhinitis 04/04/2005     Priority: Medium     Problem list name updated by automated process. Provider to review      Urge incontinence 01/18/2005     Priority: Medium        Past Medical History:    Past Medical History:   Diagnosis Date    Allergy     Depressive disorder     High cholesterol     Uncomplicated  asthma        Past Surgical History:    Past Surgical History:   Procedure Laterality Date    COLONOSCOPY  2014    Procedure: COLONOSCOPY;  Colonoscopy;  Surgeon: Jose Martin Dye MD;  Location: [facility]    IR LUMBAR PUNCTURE  6/3/2016    ARYA POLLARDC LIGATE FALLOPIAN TUBE         Family History:    Family History   Problem Relation Age of Onset    Cancer Father          at age 70    Lipids Father     Hypertension Father     Diabetes Father         Adult Onset    Other Cancer Father     Heart Disease Mother         low blood pressure, passed away 2004 lung cancer    Cancer Mother         Lung    Diabetes Mother         Adult Onset    Hyperlipidemia Mother     Respiratory Sister         uses inhalers    Cancer Sister         Lung    Heart Disease Sister         Hx Rhumatic fever    Glaucoma No family hx of     Macular Degeneration No family hx of        Social History:  Marital Status:   [2]  Social History     Tobacco Use    Smoking status: Never    Smokeless tobacco: Former   Vaping Use    Vaping status: Never Used   Substance Use Topics    Alcohol use: Not Currently     Comment: rarely.    Drug use: No        Medications:    aspirin 81 MG tablet  Cranberry 500 MG CAPS  estradiol (ESTRACE) 0.1 MG/GM vaginal cream  Krill Oil Omega-3 500 MG CAPS  levofloxacin (LEVAQUIN) 250 MG tablet  montelukast (SINGULAIR) 10 MG tablet  ondansetron (ZOFRAN ODT) 4 MG ODT tab  REBIF REBIDOSE 44 MCG/0.5ML SOAJ  sertraline (ZOLOFT) 100 MG tablet  SYMBICORT 160-4.5 MCG/ACT Inhaler          Review of Systems  Gen: No fevers, no unintentional weight changes  Head and neck: No headache, no neck pain  Eye: No eye pain, no vision changes  Cardiovascular: No chest pain, no palpitations  Abdominal: no constipation, no diarrhea  Urinary: No dysuria, no hematuria  Musculoskeletal: No joint redness, no trauma  Neurologic: No confusion, no weakness, no sensation changes  Psychiatric: No suicidal ideation, no homicidal  "ideation    Physical Exam   BP: 111/87  Pulse: 78  Temp: 98.3  F (36.8  C)  Resp: 18  Height: 167.6 cm (5' 6\")  Weight: 77.1 kg (170 lb)  SpO2: 95 %      Physical Exam  General: Alert, awake, no distress  Head: Normocephalic, atraumatic  Eyes: Grossly intact extraocular movements, conjunctiva clear, pupils equal   Mouth: Mucous membranes moist  Neck: Supple, grossly full range of motion, no observable masses  Respiratory: No distress, minor wheezing in all lung fields, no increased work of breathing  Cardiac: S1 and S2 cardiac sounds appreciated, normal rate, no edema  Abdominal: Soft, not distended, no tenderness appreciated  Neurologic: Normal level of consciousness, speech is clear and appropriate, moving all extremities grossly normal and symmetric  Skin: No gross rashes or lesions  Psych: Normal mood and appropriate for situation        ED Course        Procedures                  Results for orders placed or performed during the hospital encounter of 12/13/24 (from the past 24 hours)   EKG 12-lead, tracing only   Result Value Ref Range    Systolic Blood Pressure  mmHg    Diastolic Blood Pressure  mmHg    Ventricular Rate 68 BPM    Atrial Rate 68 BPM    NH Interval 164 ms    QRS Duration 84 ms     ms    QTc 452 ms    P Axis 62 degrees    R AXIS 43 degrees    T Axis 72 degrees    Interpretation ECG       Sinus rhythm  Nonspecific ST abnormality  Abnormal ECG  No previous ECGs available  Confirmed by SEE ED PROVIDER NOTE FOR, ECG INTERPRETATION ([id number]),  Rosie Laughlin ([id number]) on 12/13/2024 11:57:07 AM     CBC with platelets differential    Narrative    The following orders were created for panel order CBC with platelets differential.  Procedure                               Abnormality         Status                     ---------                               -----------         ------                     CBC with platelets and d...[[id number]]                      Final result                 Please " view results for these tests on the individual orders.   Comprehensive metabolic panel   Result Value Ref Range    Sodium 139 135 - 145 mmol/L    Potassium 3.1 (L) 3.4 - 5.3 mmol/L    Carbon Dioxide (CO2) 21 (L) 22 - 29 mmol/L    Anion Gap 17 (H) 7 - 15 mmol/L    Urea Nitrogen 12.5 8.0 - 23.0 mg/dL    Creatinine 0.82 0.51 - 0.95 mg/dL    GFR Estimate 77 >60 mL/min/1.73m2    Calcium 9.2 8.8 - 10.4 mg/dL    Chloride 101 98 - 107 mmol/L    Glucose 111 (H) 70 - 99 mg/dL    Alkaline Phosphatase 127 40 - 150 U/L    AST 18 0 - 45 U/L    ALT 20 0 - 50 U/L    Protein Total 6.9 6.4 - 8.3 g/dL    Albumin 4.1 3.5 - 5.2 g/dL    Bilirubin Total 0.5 <=1.2 mg/dL   Procalcitonin   Result Value Ref Range    Procalcitonin 0.09 <0.50 ng/mL   Troponin T, High Sensitivity   Result Value Ref Range    Troponin T, High Sensitivity 7 <=14 ng/L   CBC with platelets and differential   Result Value Ref Range    WBC Count 6.5 4.0 - 11.0 10e3/uL    RBC Count 4.77 3.80 - 5.20 10e6/uL    Hemoglobin 14.2 11.7 - 15.7 g/dL    Hematocrit 41.1 35.0 - 47.0 %    MCV 86 78 - 100 fL    MCH 29.8 26.5 - 33.0 pg    MCHC 34.5 31.5 - 36.5 g/dL    RDW 12.3 10.0 - 15.0 %    Platelet Count 238 150 - 450 10e3/uL    % Neutrophils 67 %    % Lymphocytes 25 %    % Monocytes 7 %    % Eosinophils 1 %    % Basophils 1 %    % Immature Granulocytes 0 %    NRBCs per 100 WBC 0 <1 /100    Absolute Neutrophils 4.3 1.6 - 8.3 10e3/uL    Absolute Lymphocytes 1.6 0.8 - 5.3 10e3/uL    Absolute Monocytes 0.5 0.0 - 1.3 10e3/uL    Absolute Eosinophils 0.1 0.0 - 0.7 10e3/uL    Absolute Basophils 0.0 0.0 - 0.2 10e3/uL    Absolute Immature Granulocytes 0.0 <=0.4 10e3/uL    Absolute NRBCs 0.0 10e3/uL   Influenza A/B, RSV and SARS-CoV2 PCR (COVID-19) Nasopharyngeal    Specimen: Nasopharyngeal; Swab   Result Value Ref Range    Influenza A PCR Negative Negative    Influenza B PCR Negative Negative    RSV PCR Negative Negative    SARS CoV2 PCR Negative Negative    Narrative    Testing was  performed using the Xpert Xpress CoV2/Flu/RSV Assay on the [facility] GeneXpert Instrument. This test should be ordered for the detection of SARS-CoV2, influenza, and RSV viruses in individuals with signs and symptoms of respiratory tract infection. This test is for in vitro diagnostic use under the [place] FDA for laboratories certified under CLIA to perform high or moderate complexity testing. This test has been [place] FDA cleared. A negative result does not rule out the presence of PCR inhibitors in the specimen or target RNA in concentration below the limit of detection for the assay. If only one viral target is positive but coinfection with multiple targets is suspected, the sample should be re-tested with another FDA cleared, approved, or authorized test, if coninfection would change clinical management. This test was validated by the [facility]. These laboratories are certified under the Clinical Laboratory Improvement Amendments of 1988 (CLIA-88) as qualified to perfom high complexity laboratory testing.   XR Chest Port 1 View    Narrative    CHEST ONE VIEW PORTABLE   12/13/2024 11:50 AM     HISTORY:  Cough.    COMPARISON: 11/17/2014.      Impression    IMPRESSION: Negative chest. Lungs clear.    BEN FLAHERTY MD         SYSTEM ID:  [id number]       Medications   sodium chloride 0.9% BOLUS 1,000 mL (0 mLs Intravenous Stopped 12/13/24 1250)   ondansetron (ZOFRAN) injection 4 mg (4 mg Intravenous $Given 12/13/24 1125)   ipratropium - albuterol 0.5 mg/2.5 mg/3 mL (DUONEB) neb solution 3 mL (3 mLs Nebulization $Given 12/13/24 1149)   potassium chloride chato ER (KLOR-CON M20) CR tablet 20 mEq (20 mEq Oral $Given 12/13/24 1253)       Assessments & Plan (with Medical Decision Making)   Vital signs reassuring.  Reviewed her previous EMR.  Will give her IV fluids and Zofran for her nausea.  Will give her a nebulizer as well.  Offered Tylenol or Motrin however she would like to wait on this.    She states  she does feel better after medications and fluids.Workup in the ER is overall reassuring today.  We did perform a viral swab that is negative.  Normal white blood cell count and procalcitonin, clear chest x-ray, low suspicion for acute bacterial pneumonia.  EKG interpreted by myself shows a sinus rhythm with a rate of 68, reassuring intervals, no gross ischemic changes.  Troponin is negative.  Mildly decreased potassium, supplement was given.  Reviewed the ER workup with the patient.  She states she feels well enough to go home.  The constellation of her symptoms today are most consistent with a viral syndrome.  Encouraged supportive care at home, encouraged to come back to the ER if any acute worsening.  Otherwise encouraged to follow-up with a primary doctor as well.    Discussed todays ER visit and current agreed upon treatment plan. Education provided and all questions answered. Instructed to follow up with pcp to discuss ER visit, make sure they are doing well, and to follow up on any incidental findings. Encouraged to come back to the ER for re evaluation if worsening or any other concerns.       I have reviewed the nursing notes.    I have reviewed the findings, diagnosis, plan and need for follow up with the patient.          Discharge Medication List as of 12/13/2024 12:56 PM        START taking these medications    Details   ondansetron (ZOFRAN ODT) 4 MG ODT tab Take 1 tablet (4 mg) by mouth every 8 hours as needed for nausea., Disp-12 tablet, R-0, E-Prescribe             Final diagnoses:   Dehydration   Acute cough   Viral infection       12/13/2024   [facility] EMERGENCY DEPT       Khoa Barahona MD  12/13/24 [id number]

## 2025-01-06 ENCOUNTER — TELEPHONE (OUTPATIENT)
Dept: GASTROENTEROLOGY | Facility: CLINIC | Age: 70
End: 2025-01-06
Payer: MEDICARE

## 2025-01-06 NOTE — TELEPHONE ENCOUNTER
"Endoscopy Scheduling Screen    Have you had any respiratory illness or flu-like symptoms in the last 10 days?  No    What is your communication preference for Instructions and/or Bowel Prep?   MyChart    What insurance is in the chart?  Other:  BC[employer]    Ordering/Referring Provider:     ROMAN SIMPSON      (If ordering provider performs procedure, schedule with ordering provider unless otherwise instructed. )    BMI: Estimated body mass index is 27.44 kg/m  as calculated from the following:    Height as of 12/13/24: 1.676 m (5' 6\").    Weight as of 12/13/24: 77.1 kg (170 lb).     Sedation Ordered  moderate sedation.   If patient BMI > 50 do not schedule in [facility].    If patient BMI > 45 do not schedule at [facility].    Are you taking methadone or Suboxone?  NO, No RN review required.    Have you been diagnosed and are being treated for severe PTSD or severe anxiety?  NO, No RN review required.    Are you taking any prescription medications for pain 3 or more times per week?   NO, No RN review required.    Do you have a history of malignant hyperthermia?  No    (Females) Are you currently pregnant?        Have you been diagnosed or told you have pulmonary hypertension?   No    Do you have an LVAD?  No    Have you been told you have moderate to severe sleep apnea?  No.    Have you been told you have COPD, asthma, or any other lung disease?  Yes     What breathing problems do you have?  Asthma     Do you use home oxygen?  No    Have your breathing problems required an ED visit or hospitalization in the last year?  No.    Do you have any heart conditions?  No     Have you ever had or are you waiting for an organ transplant?  No. Continue scheduling, no site restrictions.    Have you had a stroke or transient ischemic attack (TIA aka \"mini stroke\" in the last 6 months?   No    Have you been diagnosed with or been told you have cirrhosis of the liver?   No.    Are you currently on dialysis?   No    Do you need assistance " "transferring?   No    BMI: Estimated body mass index is 27.44 kg/m  as calculated from the following:    Height as of 12/13/24: 1.676 m (5' 6\").    Weight as of 12/13/24: 77.1 kg (170 lb).     Is patients BMI > 40 and scheduling location [facility]?  No    Do you take an injectable or oral medication for weight loss or diabetes (excluding insulin)?  No    Do you take the medication Naltrexone?  No    Do you take blood thinners?  No       Prep   Are you currently on dialysis or do you have chronic kidney disease?  No    Do you have a diagnosis of diabetes?  No    Do you have a diagnosis of cystic fibrosis (CF)?  No    On a regular basis do you go 3 -5 days between bowel movements?  No    BMI > 40?  No    Preferred Pharmacy:    [facility] [address] [place] [address] - [address]  [address]  [facility] [address]  Phone: [phone] Fax: [phone]    Final Scheduling Details     Procedure scheduled  Colonoscopy    Surgeon:  RASHEED     Date of procedure:  1/13     Pre-OP / PAC:   No - Not required for this site.    Location  [facility] - Per order.    Sedation   MAC/Deep Sedation  [facility]      Patient Reminders:   You will receive a call from a Nurse to review instructions and health history.  This assessment must be completed prior to your procedure.  Failure to complete the Nurse assessment may result in the procedure being cancelled.      On the day of your procedure, please designate an adult(s) who can drive you home stay with you for the next 24 hours. The medicines used in the exam will make you sleepy. You will not be able to drive.      You cannot take public transportation, ride share services, or non-medical taxi service without a responsible caregiver.  Medical transport services are allowed with the requirement that a responsible caregiver will receive you at your destination.  We require that drivers and caregivers are confirmed prior to your procedure.  "

## 2025-01-07 DIAGNOSIS — Z29.11 NEED FOR VACCINATION AGAINST RESPIRATORY SYNCYTIAL VIRUS: ICD-10-CM

## 2025-01-07 NOTE — TELEPHONE ENCOUNTER
Bowel Prep Review:    Standard Miralax bowel prep.    Recommended due to standard bowel prep.   Instructions were sent via [employer].       Deja Leung LPN  Endoscopy Procedure Pre Assessment

## 2025-01-09 RX ORDER — BUDESONIDE AND FORMOTEROL FUMARATE DIHYDRATE 160; 4.5 UG/1; UG/1
2 AEROSOL RESPIRATORY (INHALATION) 2 TIMES DAILY
Qty: 10.2 G | Refills: 0 | Status: SHIPPED | OUTPATIENT
Start: 2025-01-09

## 2025-01-12 ENCOUNTER — ANESTHESIA EVENT (OUTPATIENT)
Dept: GASTROENTEROLOGY | Facility: CLINIC | Age: 70
End: 2025-01-12
Payer: MEDICARE

## 2025-01-12 NOTE — ANESTHESIA PREPROCEDURE EVALUATION
Anesthesia Pre-Procedure Evaluation    Patient: Cecile Mays   MRN: [id number] : 1955        Procedure : Procedure(s):  Colonoscopy          Past Medical History:   Diagnosis Date     Allergy      Depressive disorder      High cholesterol      Uncomplicated asthma     On record      Past Surgical History:   Procedure Laterality Date     COLONOSCOPY  2014    Procedure: COLONOSCOPY;  Colonoscopy;  Surgeon: Jose Martin Dye MD;  Location: [facility]     IR LUMBAR PUNCTURE  6/3/2016     LASIK       ZZC LIGATE FALLOPIAN TUBE        Allergies   Allergen Reactions     No Known Drug Allergy       Social History     Tobacco Use     Smoking status: Never     Smokeless tobacco: Former   Substance Use Topics     Alcohol use: Not Currently     Comment: rarely.      Wt Readings from Last 1 Encounters:   24 77.1 kg (170 lb)        Anesthesia Evaluation   Pt has had prior anesthetic. Type: MAC.        ROS/MED HX  ENT/Pulmonary:     (+)           allergic rhinitis,           asthma                  Neurologic:     (+)                   Multiple Sclerosis,             Cardiovascular:     (+) Dyslipidemia - -   -  - -                                 Previous cardiac testing   Echo: Date: Results:    Stress Test:  Date: Results:    ECG Reviewed:  Date: 2024 Results:  SR  Cath:  Date: Results:      METS/Exercise Tolerance:     Hematologic:  - neg hematologic  ROS     Musculoskeletal: Comment: Sacroiliac joint dysfunction      GI/Hepatic: Comment: esophagitis    (+)        bowel prep,            Renal/Genitourinary: Comment: Urge incontinence      Endo:       Psychiatric/Substance Use: Comment:   Adjustment disorder with anxious mood        (+) psychiatric history anxiety and depression       Infectious Disease:  - neg infectious disease ROS     Malignancy:  - neg malignancy ROS     Other:            Physical Exam    Airway  airway exam normal      Mallampati: II   TM distance: > 3 FB   Neck ROM:  "full   Mouth opening: > 3 cm    Respiratory Devices and Support         Dental  no notable dental history         Cardiovascular   cardiovascular exam normal       Rhythm and rate: regular and normal     Pulmonary   pulmonary exam normal        breath sounds clear to auscultation       OUTSIDE LABS:  CBC:   Lab Results   Component Value Date    WBC 6.5 12/13/2024    WBC 7.3 10/16/2023    HGB 14.2 12/13/2024    HGB 13.4 10/16/2023    HCT 41.1 12/13/2024    HCT 41.8 10/16/2023     12/13/2024     10/16/2023     BMP:   Lab Results   Component Value Date     12/13/2024     12/01/2022    POTASSIUM 3.1 (L) 12/13/2024    POTASSIUM 3.6 12/01/2022    CHLORIDE 101 12/13/2024    CHLORIDE 109 12/01/2022    CO2 21 (L) 12/13/2024    CO2 30 12/01/2022    BUN 12.5 12/13/2024    BUN 11 12/01/2022    CR 0.82 12/13/2024    CR 0.75 12/01/2022     (H) 12/13/2024     (H) 12/01/2022     COAGS: No results found for: \"PTT\", \"INR\", \"FIBR\"  POC: No results found for: \"BGM\", \"HCG\", \"HCGS\"  HEPATIC:   Lab Results   Component Value Date    ALBUMIN 4.1 12/13/2024    PROTTOTAL 6.9 12/13/2024    ALT 20 12/13/2024    AST 18 12/13/2024    ALKPHOS 127 12/13/2024    BILITOTAL 0.5 12/13/2024     OTHER:   Lab Results   Component Value Date    A1C 5.3 01/02/2017    RAIN 9.2 12/13/2024    TSH 1.68 09/17/2020    SED 6 04/25/2016       Anesthesia Plan    ASA Status:  3    NPO Status:  NPO Appropriate    Anesthesia Type: MAC.     - Reason for MAC: straight local not clinically adequate   Induction: Intravenous, Propofol.   Maintenance: TIVA.        Consents    Anesthesia Plan(s) and associated risks, benefits, and realistic alternatives discussed. Questions answered and patient/representative(s) expressed understanding.     - Discussed:     - Discussed with:  Patient      - Extended Intubation/Ventilatory Support Discussed: No.      - Patient is DNR/DNI Status: No     Use of blood products discussed: No . "     Postoperative Care    Pain management: Oral pain medications.   PONV prophylaxis: Background Propofol Infusion     Comments:    Other Comments: The risks and benefits of anesthesia, and the alternatives where applicable, have been discussed with the patient, and they wish to proceed.              SANDHYA Farrell CRNA    I have reviewed the pertinent notes and labs in the chart from the past 30 days and (re)examined the patient.  Any updates or changes from those notes are reflected in this note.

## 2025-01-13 ENCOUNTER — HOSPITAL ENCOUNTER (OUTPATIENT)
Facility: CLINIC | Age: 70
Discharge: HOME | End: 2025-01-13
Attending: SURGERY | Admitting: SURGERY
Payer: MEDICARE

## 2025-01-13 ENCOUNTER — ANESTHESIA (OUTPATIENT)
Dept: GASTROENTEROLOGY | Facility: CLINIC | Age: 70
End: 2025-01-13
Payer: MEDICARE

## 2025-01-13 VITALS
BODY MASS INDEX: 27.44 KG/M2 | DIASTOLIC BLOOD PRESSURE: 61 MMHG | WEIGHT: 170 LBS | HEART RATE: 81 BPM | OXYGEN SATURATION: 96 % | RESPIRATION RATE: 16 BRPM | SYSTOLIC BLOOD PRESSURE: 103 MMHG | TEMPERATURE: 97.9 F

## 2025-01-13 LAB — COLONOSCOPY: NORMAL

## 2025-01-13 PROCEDURE — 88305 TISSUE EXAM BY PATHOLOGIST: CPT | Mod: TC | Performed by: SURGERY

## 2025-01-13 PROCEDURE — 370N000017 HC ANESTHESIA TECHNICAL FEE, PER MIN: Performed by: SURGERY

## 2025-01-13 PROCEDURE — 250N000009 HC RX 250: Performed by: NURSE ANESTHETIST, CERTIFIED REGISTERED

## 2025-01-13 PROCEDURE — 250N000011 HC RX IP 250 OP 636: Performed by: NURSE ANESTHETIST, CERTIFIED REGISTERED

## 2025-01-13 PROCEDURE — 45380 COLONOSCOPY AND BIOPSY: CPT | Performed by: SURGERY

## 2025-01-13 PROCEDURE — 45385 COLONOSCOPY W/LESION REMOVAL: CPT | Mod: PT | Performed by: SURGERY

## 2025-01-13 RX ORDER — ONDANSETRON 2 MG/ML
4 INJECTION INTRAMUSCULAR; INTRAVENOUS
Status: DISCONTINUED | OUTPATIENT
Start: 2025-01-13 | End: 2025-01-13 | Stop reason: HOSPADM

## 2025-01-13 RX ORDER — FLUMAZENIL 0.1 MG/ML
0.2 INJECTION, SOLUTION INTRAVENOUS
Status: DISCONTINUED | OUTPATIENT
Start: 2025-01-13 | End: 2025-01-13 | Stop reason: HOSPADM

## 2025-01-13 RX ORDER — PROPOFOL 10 MG/ML
INJECTION, EMULSION INTRAVENOUS PRN
Status: DISCONTINUED | OUTPATIENT
Start: 2025-01-13 | End: 2025-01-13

## 2025-01-13 RX ORDER — ONDANSETRON 4 MG/1
4 TABLET, ORALLY DISINTEGRATING ORAL EVERY 6 HOURS PRN
Status: DISCONTINUED | OUTPATIENT
Start: 2025-01-13 | End: 2025-01-13 | Stop reason: HOSPADM

## 2025-01-13 RX ORDER — PROCHLORPERAZINE MALEATE 5 MG/1
5 TABLET ORAL EVERY 6 HOURS PRN
Status: DISCONTINUED | OUTPATIENT
Start: 2025-01-13 | End: 2025-01-13 | Stop reason: HOSPADM

## 2025-01-13 RX ORDER — PROPOFOL 10 MG/ML
INJECTION, EMULSION INTRAVENOUS CONTINUOUS PRN
Status: DISCONTINUED | OUTPATIENT
Start: 2025-01-13 | End: 2025-01-13

## 2025-01-13 RX ORDER — NALOXONE HYDROCHLORIDE 0.4 MG/ML
0.2 INJECTION, SOLUTION INTRAMUSCULAR; INTRAVENOUS; SUBCUTANEOUS
Status: DISCONTINUED | OUTPATIENT
Start: 2025-01-13 | End: 2025-01-13 | Stop reason: HOSPADM

## 2025-01-13 RX ORDER — ONDANSETRON 4 MG/1
4 TABLET, ORALLY DISINTEGRATING ORAL EVERY 30 MIN PRN
Status: DISCONTINUED | OUTPATIENT
Start: 2025-01-13 | End: 2025-01-13 | Stop reason: HOSPADM

## 2025-01-13 RX ORDER — NALOXONE HYDROCHLORIDE 0.4 MG/ML
0.4 INJECTION, SOLUTION INTRAMUSCULAR; INTRAVENOUS; SUBCUTANEOUS
Status: DISCONTINUED | OUTPATIENT
Start: 2025-01-13 | End: 2025-01-13 | Stop reason: HOSPADM

## 2025-01-13 RX ORDER — LIDOCAINE HYDROCHLORIDE 10 MG/ML
INJECTION, SOLUTION INFILTRATION; PERINEURAL PRN
Status: DISCONTINUED | OUTPATIENT
Start: 2025-01-13 | End: 2025-01-13

## 2025-01-13 RX ORDER — LIDOCAINE 40 MG/G
CREAM TOPICAL
Status: DISCONTINUED | OUTPATIENT
Start: 2025-01-13 | End: 2025-01-13 | Stop reason: HOSPADM

## 2025-01-13 RX ORDER — ONDANSETRON 2 MG/ML
4 INJECTION INTRAMUSCULAR; INTRAVENOUS EVERY 6 HOURS PRN
Status: DISCONTINUED | OUTPATIENT
Start: 2025-01-13 | End: 2025-01-13 | Stop reason: HOSPADM

## 2025-01-13 RX ORDER — ONDANSETRON 2 MG/ML
4 INJECTION INTRAMUSCULAR; INTRAVENOUS EVERY 30 MIN PRN
Status: DISCONTINUED | OUTPATIENT
Start: 2025-01-13 | End: 2025-01-13 | Stop reason: HOSPADM

## 2025-01-13 RX ORDER — NALOXONE HYDROCHLORIDE 0.4 MG/ML
0.1 INJECTION, SOLUTION INTRAMUSCULAR; INTRAVENOUS; SUBCUTANEOUS
Status: DISCONTINUED | OUTPATIENT
Start: 2025-01-13 | End: 2025-01-13 | Stop reason: HOSPADM

## 2025-01-13 RX ORDER — DEXAMETHASONE SODIUM PHOSPHATE 10 MG/ML
4 INJECTION, SOLUTION INTRAMUSCULAR; INTRAVENOUS
Status: DISCONTINUED | OUTPATIENT
Start: 2025-01-13 | End: 2025-01-13 | Stop reason: HOSPADM

## 2025-01-13 RX ADMIN — PROPOFOL 100 MG: 10 INJECTION, EMULSION INTRAVENOUS at 13:29

## 2025-01-13 RX ADMIN — LIDOCAINE HYDROCHLORIDE 30 MG: 10 INJECTION, SOLUTION INFILTRATION; PERINEURAL at 13:29

## 2025-01-13 RX ADMIN — PROPOFOL 200 MCG/KG/MIN: 10 INJECTION, EMULSION INTRAVENOUS at 13:29

## 2025-01-13 ASSESSMENT — ACTIVITIES OF DAILY LIVING (ADL)
ADLS_ACUITY_SCORE: 41

## 2025-01-13 NOTE — ANESTHESIA POSTPROCEDURE EVALUATION
Patient: Cecile Mays    Procedure: Procedure(s):  COLONOSCOPY, WITH POLYPECTOMY       Anesthesia Type:  MAC    Note:  Disposition: Outpatient   Postop Pain Control: Uneventful            Sign Out: Well controlled pain   PONV: No   Neuro/Psych: Uneventful            Sign Out: Acceptable/Baseline neuro status   Airway/Respiratory: Uneventful            Sign Out: Acceptable/Baseline resp. status   CV/Hemodynamics: Uneventful            Sign Out: Acceptable CV status; No obvious hypovolemia; No obvious fluid overload   Other NRE: NONE   DID A NON-ROUTINE EVENT OCCUR? No           Last vitals:  Vitals Value Taken Time   /61 01/13/25 1410   Temp     Pulse 81 01/13/25 1410   Resp     SpO2 96 % 01/13/25 1404       Electronically Signed By: SANDHYA Joyner CRNA  January 13, 2025  3:42 PM

## 2025-01-13 NOTE — DISCHARGE INSTRUCTIONS
[facility]    Home Care Following Colonoscopy          Activity:  You have just undergone an endoscopic procedure usually performed with conscious sedation.  Do not work or operate machinery (including a car) for at least 12 hours.    I encourage you to walk and attempt to pass this air as soon as possible.    Diet:  Return to the diet you were on before your procedure but eat lightly for the first 12-24 hours.  Drink plenty of water.  Resume any regular medications unless otherwise advised by your physician.  Please begin any new medication prescribed as a result of your procedure as directed by your physician.   If you had any biopsy or polyp removed please refrain from aspirin or aspirin products for 2 days.  If on Coumadin please restart as instructed by your physician.   Pain:  You may take Tylenol as needed for pain.  Expected Recovery:  You can expect some mild abdominal fullness and/or discomfort due to the air used to inflate your intestinal tract. It is also normal to have a mild sore throat after upper endoscopy.    Call Your Physician if You Have:  After Upper Endoscopy:  Shoulder, back or chest pain.  Difficulty breathing or swallowing.  Vomiting blood.  After Colonoscopy:  Worsening persisting abdominal pain which is worse with activity.  Fevers (>101 degrees F), chills or shakes.  Passage of continued blood with bowel movements.     Any questions or concerns about your recovery, please call [phone] or after hours [phone] ([phone]) Nurse Advice Line.    Follow-up Care:  You did have polyps/biopsy tissue sample(s) removed.  The polyps/biopsy tissue sample(s) will be sent to pathology.    You should receive letter in your My Chart from Dr. Norman with your results within 1-2 weeks. If you do not participate in My Chart a physical letter will come in the mail in 2-3 weeks.  Please call if you have not received a notification of your results.  If asked to return to  clinic please make an appointment 1 week after your procedure.  Call [phone].

## 2025-01-13 NOTE — ANESTHESIA CARE TRANSFER NOTE
Patient: Cecile Mays    Procedure: Procedure(s):  COLONOSCOPY, WITH POLYPECTOMY       Diagnosis: Screen for colon cancer [Z12.11]  Diagnosis Additional Information: No value filed.    Anesthesia Type:   MAC     Note:    Oropharynx: oropharynx clear of all foreign objects and spontaneously breathing  Level of Consciousness: drowsy  Oxygen Supplementation: room air    Independent Airway: airway patency satisfactory and stable  Dentition: dentition unchanged  Vital Signs Stable: post-procedure vital signs reviewed and stable  Report to RN Given: handoff report given  Patient transferred to: Phase II    Handoff Report: Identifed the Patient, Identified the Reponsible Provider, Reviewed the pertinent medical history, Discussed the surgical course, Reviewed Intra-OP anesthesia mangement and issues during anesthesia, Set expectations for post-procedure period and Allowed opportunity for questions and acknowledgement of understanding  Vitals:  Vitals Value Taken Time   BP 79/53 01/13/25 1350   Temp     Pulse 75 01/13/25 1350   Resp     SpO2 94 % 01/13/25 1351   Vitals shown include unfiled device data.    Electronically Signed By: SANDHYA Farrell CRNA  January 13, 2025  1:52 PM

## 2025-01-15 LAB
PATH REPORT.COMMENTS IMP SPEC: NORMAL
PATH REPORT.COMMENTS IMP SPEC: NORMAL
PATH REPORT.FINAL DX SPEC: NORMAL
PATH REPORT.GROSS SPEC: NORMAL
PATH REPORT.MICROSCOPIC SPEC OTHER STN: NORMAL
PATH REPORT.RELEVANT HX SPEC: NORMAL
PHOTO IMAGE: NORMAL

## 2025-01-20 ENCOUNTER — OFFICE VISIT (OUTPATIENT)
Dept: NEUROLOGY | Facility: CLINIC | Age: 70
End: 2025-01-20
Payer: MEDICARE

## 2025-01-20 VITALS
DIASTOLIC BLOOD PRESSURE: 79 MMHG | HEIGHT: 66 IN | SYSTOLIC BLOOD PRESSURE: 127 MMHG | HEART RATE: 75 BPM | WEIGHT: 170 LBS | BODY MASS INDEX: 27.32 KG/M2

## 2025-01-20 DIAGNOSIS — H50.15 ALTERNATING EXOTROPIA: ICD-10-CM

## 2025-01-20 DIAGNOSIS — R26.9 GAIT DISTURBANCE: ICD-10-CM

## 2025-01-20 DIAGNOSIS — G35 MS (MULTIPLE SCLEROSIS) (H): Primary | ICD-10-CM

## 2025-01-20 DIAGNOSIS — R53.82 CHRONIC FATIGUE, UNSPECIFIED: ICD-10-CM

## 2025-01-20 PROCEDURE — 99214 OFFICE O/P EST MOD 30 MIN: CPT | Performed by: PSYCHIATRY & NEUROLOGY

## 2025-01-20 PROCEDURE — G2211 COMPLEX E/M VISIT ADD ON: HCPCS | Performed by: PSYCHIATRY & NEUROLOGY

## 2025-01-20 ASSESSMENT — PAIN SCALES - GENERAL: PAINLEVEL_OUTOF10: NO PAIN (0)

## 2025-01-20 NOTE — LETTER
"1/20/2025      RE: Cecile Mays  [address]  [facility] [address]     Referral source: Established patient    Chief complaint: Multiple sclerosis    History of the Present Illness: Ms. Cecile Mays is a 69 year old right-handed woman who presents to the [facility]clerosis [facility] today for a scheduled follow up visit regarding her diagnosis of multiple sclerosis.    The patient's history is as per my previous notes.  She initially developed symptoms of demyelinating disease in 2016 when she had an episode of left facial numbness. MRI and CSF studies were suggestive of demyelinating disease of the type seen in multiple sclerosis. Subsequently she was formally diagnosed with multiple sclerosis based on radiologic accumulation of lesions on MRI when she was on disease-modifying treatment with glatiramer acetate.  She was then switched to the Rebif formulation of interferon beta in 2017, and remains on that medication up until the present time.     Today, the patient denies any new episodic changes in vision, balance, strength, or sensation suggestive of new relapse of multiple sclerosis since she was last seen in this clinic.    Symptomatically, she has noted some mild reduction in dexterity in the left hand over time.  Her stamina is also less than it used to be, although she notes that she is still more active than other people her age, and she is unsure as to what extent this is due to MS versus normal aging.    Regarding gait, she is not requiring a gait aid and is not falling.  She notes that she tends to \"pull to the left\" when walking.    PHYSICAL EXAMINATION:  VITAL SIGNS: Blood pressure 127/79; pulse 75; weight 77.1 kg; height 1.68 m  GENERAL: Well-nourished woman who presents to the examination accompanied by her , awake and alert and in no acute distress.    NEUROLOGIC EXAMINATION:  CRANIAL NERVES: Visual fields are full to confrontation.  Extraocular movements are intact " with no internuclear ophthalmoplegia.  Facial strength is normal.  Palate elevation and tongue protrusion are normal.  POWER: Strength is within normal limits in proximal and distal muscles in the upper and lower limbs throughout.  REFLEXES: Reflexes are symmetric and within normal limits in the arms.  Reflexes are mildly increased in the left leg compared to the right.  MOTOR/CEREBELLAR: Finger-to-nose testing and rapid alternating movements are mildly impaired in the left hand and fingers.  There is no pronator drift in the arms.  GAIT: The patient is able to ambulate on a flat, level surface with no gross loss of postural stability, and able to walk on heels and toes.  Tandem gait is mildly impaired for age.    Assessment/plan:    1. Multiple sclerosis  Overall, the patient's continues to do quite well on disease modifying therapy with Rebif, apart from some subtle left-sided motor involvement and balance difficulty.  There is no clinical evidence of active inflammatory demyelination over the past year.    I reviewed the results of the recently reported DISCO-MS trial with the patient.  This trial enrolled subjects with multiple sclerosis aged 55 and older with several years of clinical and radiologic stability as regards any evidence of active inflammatory demyelination on disease modifying treatment.(DMT). Subjects were randomized to either remaining on current treatment or discontinuing therapy.     Key findings from this trial included the followin) A large majority of individuals who stopped treatment (~88%) maintained stability with no new clinical or MRI evidence of active demyelination off of disease modifying therapy.    2) No difference in disability outcomes between the 2 groups was detected.    3)  However, the trial did not demonstrate non-inferiority of stopping DMT versus continuing it, with~95% of subjects who remained on treatment remaining stable as regards evidence of new  demyelination.    My interpretation of the results is that remaining on DMT may be associated with a slightly reduced likelihood of breakthrough demyelination, but a large majority of individuals who fit the inclusion criteria for this trial would presumably remain stable with, or without, treatment. Whether to continue DMT is a personal decision based on individual preference as well as the risk profile of the particular DMT being used.    At present, she is inclined to continue Rebif. Given the quite benign safety profile of this medication, I think that this is a reasonable decision.    I will see her back in 1 year with MRI scans of the brain and cervical spine to be performed prior to that appointment in order to monitor the radiologic stability of her condition.    2. Gait disturbance  She is compensating well for balance difficulties.  We will continue to keep an eye on her gait stability over time.    3. Chronic fatigue  We discussed that regular aerobic exercise has been shown to be of symptomatic benefit for MS associated fatigue in several studies, and I advised an initial goal of 90 minutes of aerobic exercise weekly.      4. Alternating exotropia  She has been evaluated by Dr. Fortune of neuro-ophthalmology at the [facility] for this issue and has prism lenses.  She requests a referral to Dr. Gilma Neumann of the [facility] for follow-up as she would prefer not to drive downtown.  This referral was provided for her.  We will see if we can get this arranged at the [facility]; otherwise she can go to the [facility] location in [place].    I spent a total of 31 minutes on patient care activities related to this encounter on the date of service, including time spent in reviewing the chart, obtaining history and examination from and in counseling the patient, and in documentation in the electronic medical record.    The longitudinal plans of care for the diagnoses  as documented were addressed during this visit. Due to the added complexity in care, I will continue to support the patient in subsequent management and with ongoing continuity of care.    Viraj Santoyo MD   of Neurology  [facility]    Cc:  Valdez Hurd MD (PCP)  Patient

## 2025-01-20 NOTE — PROGRESS NOTES
"Referral source: Established patient    Chief complaint: Multiple sclerosis    History of the Present Illness: Ms. Cecile Mays is a 69 year old right-handed woman who presents to the [facility] Sclerosis Clinic today for a scheduled follow up visit regarding her diagnosis of multiple sclerosis.    The patient's history is as per my previous notes.  She initially developed symptoms of demyelinating disease in 2016 when she had an episode of left facial numbness. MRI and CSF studies were suggestive of demyelinating disease of the type seen in multiple sclerosis. Subsequently she was formally diagnosed with multiple sclerosis based on radiologic accumulation of lesions on MRI when she was on disease-modifying treatment with glatiramer acetate.  She was then switched to the Rebif formulation of interferon beta in 2017, and remains on that medication up until the present time.     Today, the patient denies any new episodic changes in vision, balance, strength, or sensation suggestive of new relapse of multiple sclerosis since she was last seen in this clinic.    Symptomatically, she has noted some mild reduction in dexterity in the left hand over time.  Her stamina is also less than it used to be, although she notes that she is still more active than other people her age, and she is unsure as to what extent this is due to MS versus normal aging.    Regarding gait, she is not requiring a gait aid and is not falling.  She notes that she tends to \"pull to the left\" when walking.    PHYSICAL EXAMINATION:  VITAL SIGNS: Blood pressure 127/79; pulse 75; weight 77.1 kg; height 1.68 m  GENERAL: Well-nourished woman who presents to the examination accompanied by her , awake and alert and in no acute distress.    NEUROLOGIC EXAMINATION:  CRANIAL NERVES: Visual fields are full to confrontation.  Extraocular movements are intact with no internuclear ophthalmoplegia.  Facial strength is normal.  Palate elevation and " tongue protrusion are normal.  POWER: Strength is within normal limits in proximal and distal muscles in the upper and lower limbs throughout.  REFLEXES: Reflexes are symmetric and within normal limits in the arms.  Reflexes are mildly increased in the left leg compared to the right.  MOTOR/CEREBELLAR: Finger-to-nose testing and rapid alternating movements are mildly impaired in the left hand and fingers.  There is no pronator drift in the arms.  GAIT: The patient is able to ambulate on a flat, level surface with no gross loss of postural stability, and able to walk on heels and toes.  Tandem gait is mildly impaired for age.    Assessment/plan:    1. Multiple sclerosis  Overall, the patient's continues to do quite well on disease modifying therapy with Rebif, apart from some subtle left-sided motor involvement and balance difficulty.  There is no clinical evidence of active inflammatory demyelination over the past year.    I reviewed the results of the recently reported DISCO-MS trial with the patient.  This trial enrolled subjects with multiple sclerosis aged 55 and older with several years of clinical and radiologic stability as regards any evidence of active inflammatory demyelination on disease modifying treatment.(DMT). Subjects were randomized to either remaining on current treatment or discontinuing therapy.     Key findings from this trial included the followin) A large majority of individuals who stopped treatment (~88%) maintained stability with no new clinical or MRI evidence of active demyelination off of disease modifying therapy.    2) No difference in disability outcomes between the 2 groups was detected.    3)  However, the trial did not demonstrate non-inferiority of stopping DMT versus continuing it, with~95% of subjects who remained on treatment remaining stable as regards evidence of new demyelination.    My interpretation of the results is that remaining on DMT may be associated with a  slightly reduced likelihood of breakthrough demyelination, but a large majority of individuals who fit the inclusion criteria for this trial would presumably remain stable with, or without, treatment. Whether to continue DMT is a personal decision based on individual preference as well as the risk profile of the particular DMT being used.    At present, she is inclined to continue Rebif. Given the quite benign safety profile of this medication, I think that this is a reasonable decision.    I will see her back in 1 year with MRI scans of the brain and cervical spine to be performed prior to that appointment in order to monitor the radiologic stability of her condition.    2. Gait disturbance  She is compensating well for balance difficulties.  We will continue to keep an eye on her gait stability over time.    3. Chronic fatigue  We discussed that regular aerobic exercise has been shown to be of symptomatic benefit for MS associated fatigue in several studies, and I advised an initial goal of 90 minutes of aerobic exercise weekly.    4. Alternating exotropia  She has been evaluated by Dr. Fortune of neuro-ophthalmology at the [facility] for this issue and has prism lenses.  She requests a referral to Dr. Gilma Neumann of the [facility] for follow-up as she would prefer not to drive downtown.  This referral was provided for her.  We will see if we can get this arranged at the [facility]; otherwise she can go to the [facility] location in [place].    I spent a total of 31 minutes on patient care activities related to this encounter on the date of service, including time spent in reviewing the chart, obtaining history and examination from and in counseling the patient, and in documentation in the electronic medical record.    The longitudinal plans of care for the diagnoses as documented were addressed during this visit. Due to the added complexity in care, I will continue  to support the patient in subsequent management and with ongoing continuity of care.

## 2025-01-20 NOTE — Clinical Note
"1/20/2025      Cecile Mays  [address] [facility] [address]      Dear Colleague,    Thank you for referring your patient, Cecile Mays, to the [facility] [place]. Please see a copy of my visit note below.    Referral source: Established patient    Chief complaint: Multiple sclerosis    History of the Present Illness: Ms. Cecile Mays is a 69 year old right-handed woman who presents to the [facility] Sclerosis Clinic today for a scheduled follow up visit regarding her diagnosis of multiple sclerosis.    The patient's history is as per my previous notes.  She initially developed symptoms of demyelinating disease in 2016 when she had an episode of left facial numbness. MRI and CSF studies were suggestive of demyelinating disease of the type seen in multiple sclerosis. Subsequently she was formally diagnosed with multiple sclerosis based on radiologic accumulation of lesions on MRI when she was on disease-modifying treatment with glatiramer acetate.  She was then switched to the Rebif formulation of interferon beta in 2017, and remains on that medication up until the present time.     Today, the patient denies any new episodic changes in vision, balance, strength, or sensation suggestive of new relapse of multiple sclerosis since she was last seen in this clinic.    Symptomatically, she has noted some mild reduction in dexterity in the left hand over time.  Her stamina is also less than it used to be, although she notes that she is still more active than other people her age, and she is unsure as to what extent this is due to MS versus normal aging.    Regarding gait, she is not requiring a gait aid and is not falling.  She notes that she tends to \"pull to the left\" when walking.    PHYSICAL EXAMINATION:  VITAL SIGNS: Blood pressure 127/79; pulse 75; weight 77.1 kg; height 1.68 m  GENERAL: Well-nourished woman who presents to the examination accompanied " by her , awake and alert and in no acute distress.    NEUROLOGIC EXAMINATION:  CRANIAL NERVES: Visual fields are full to confrontation.  Extraocular movements are intact with no internuclear ophthalmoplegia.  Facial strength is normal.  Palate elevation and tongue protrusion are normal.  POWER: Strength is within normal limits in proximal and distal muscles in the upper and lower limbs throughout.  REFLEXES: Reflexes are symmetric and within normal limits in the arms.  Reflexes are mildly increased in the left leg compared to the right.  MOTOR/CEREBELLAR: Finger-to-nose testing and rapid alternating movements are mildly impaired in the left hand and fingers.  There is no pronator drift in the arms.  GAIT: The patient is able to ambulate on a flat, level surface with no gross loss of postural stability, and able to walk on heels and toes.  Tandem gait is mildly impaired for age.    Assessment/plan:    1. Multiple sclerosis  Overall, the patient's continues to do quite well on disease modifying therapy with Rebif, apart from some subtle left-sided motor involvement and balance difficulty.  There is no clinical evidence of active inflammatory demyelination over the past year.    I reviewed the results of the recently reported DISCO-MS trial with the patient.  This trial enrolled subjects with multiple sclerosis aged 55 and older with several years of clinical and radiologic stability as regards any evidence of active inflammatory demyelination on disease modifying treatment.(DMT). Subjects were randomized to either remaining on current treatment or discontinuing therapy.     Key findings from this trial included the followin) A large majority of individuals who stopped treatment (~88%) maintained stability with no new clinical or MRI evidence of active demyelination off of disease modifying therapy.    2) No difference in disability outcomes between the 2 groups was detected.    3)  However, the trial did  not demonstrate non-inferiority of stopping DMT versus continuing it, with~95% of subjects who remained on treatment remaining stable as regards evidence of new demyelination.    My interpretation of the results is that remaining on DMT may be associated with a slightly reduced likelihood of breakthrough demyelination, but a large majority of individuals who fit the inclusion criteria for this trial would presumably remain stable with, or without, treatment. Whether to continue DMT is a personal decision based on individual preference as well as the risk profile of the particular DMT being used.    At present, she is inclined to continue Rebif. Given the quite benign safety profile of this medication, I think that this is a reasonable decision.    I will see her back in 1 year with MRI scans of the brain and cervical spine to be performed prior to that appointment in order to monitor the radiologic stability of her condition.    2. Gait disturbance  She is compensating well for balance difficulties.  We will continue to keep an eye on her gait stability over time.    3. Chronic fatigue  We discussed that regular aerobic exercise has been shown to be of symptomatic benefit for MS associated fatigue in several studies, and I advised an initial goal of 90 minutes of aerobic exercise weekly.    4. Alternating exotropia  She has been evaluated by Dr. Fortune of neuro-ophthalmology at the [facility] for this issue and has prism lenses.  She requests a referral to Dr. Gilma Neumann of the [facility] for follow-up as she would prefer not to drive downtown.  This referral was provided for her.  We will see if we can get this arranged at the [facility]; otherwise she can go to the [facility] location in [place].    I spent a total of 31 minutes on patient care activities related to this encounter on the date of service, including time spent in reviewing the chart, obtaining history  and examination from and in counseling the patient, and in documentation in the electronic medical record.    The longitudinal plans of care for the diagnoses as documented were addressed during this visit. Due to the added complexity in care, I will continue to support the patient in subsequent management and with ongoing continuity of care.      Again, thank you for allowing me to participate in the care of your patient.        Sincerely,        Viraj Santoyo MD    Electronically signed

## 2025-01-20 NOTE — PATIENT INSTRUCTIONS
We have referred you to Dr. Gilma Neumann at [facility] ([phone]) for ophthalmology evaluation     2.   Continue Rebif injections three times weekly    3.   MRI scans of the brain and cervical spine in one year and return to clinic after

## 2025-02-11 DIAGNOSIS — Z29.11 NEED FOR VACCINATION AGAINST RESPIRATORY SYNCYTIAL VIRUS: ICD-10-CM

## 2025-02-12 RX ORDER — BUDESONIDE AND FORMOTEROL FUMARATE DIHYDRATE 160; 4.5 UG/1; UG/1
2 AEROSOL RESPIRATORY (INHALATION) 2 TIMES DAILY
Qty: 10.2 G | Refills: 0 | Status: SHIPPED | OUTPATIENT
Start: 2025-02-12

## 2025-02-19 ENCOUNTER — TELEPHONE (OUTPATIENT)
Dept: NEUROLOGY | Facility: CLINIC | Age: 70
End: 2025-02-19
Payer: MEDICARE

## 2025-02-19 NOTE — TELEPHONE ENCOUNTER
[facility] Call Center    Phone Message    May a detailed message be left on voicemail: yes     Reason for Call: Other: [facility] is calling in regards to pt, requesting information on REBIF REBIDOSE 44 MCG/0.5ML SOAJ medication to initiate a prior auth.    Contact Facility at [phone] Opt 3    Action Taken: Message routed to:  Other: [facility]    Travel Screening: Not Applicable     Date of Service:

## 2025-02-26 DIAGNOSIS — G35 MULTIPLE SCLEROSIS (H): ICD-10-CM

## 2025-02-26 NOTE — TELEPHONE ENCOUNTER
[facility] Call Center    Phone Message    May a detailed message be left on voicemail: yes     Reason for Call: Medication Refill Request    Has the patient contacted the pharmacy for the refill? Yes   Name of medication being requested: REBIF Auto Injections    Provider who prescribed the medication: Viraj Santoyo MD     Pharmacy: [facility] - [place], [place] - [address]    Date medication is needed: 02/27/2025    Patient is completely out of this medication.    Pre authorization is needed in order to prescribe.     # [phone]   Fax # [phone]    Action Taken: Message routed to:  Adult Clinics: Neurology p [id number]    Travel Screening: Not Applicable

## 2025-02-27 RX ORDER — INTERFERON BETA-1A 44 UG/.5ML
44 INJECTION, SOLUTION SUBCUTANEOUS
Qty: 6 ML | Refills: 11 | OUTPATIENT
Start: 2025-02-28

## 2025-02-27 NOTE — TELEPHONE ENCOUNTER
Pending Prescriptions:                       Disp   Refills    REBIF REBIDOSE 44 MCG/0.5ML SOAJ          6 mL   11           Sig: Inject 44 mcg subcutaneously three times a week.       Requested Pharmacy: [facility] - [place], [place] - [facility] [address]     Pt's last office visit: 01/20/25  Next scheduled office visit: 01/19/26      Per the RN/LPN medication refill protocol, writer is unable to refill this request.

## 2025-03-31 ENCOUNTER — TELEPHONE (OUTPATIENT)
Dept: UROLOGY | Facility: CLINIC | Age: 70
End: 2025-03-31
Payer: MEDICARE

## 2025-03-31 DIAGNOSIS — N32.81 OAB (OVERACTIVE BLADDER): Primary | ICD-10-CM

## 2025-03-31 NOTE — TELEPHONE ENCOUNTER
Surgery Pre-Certification     Medical Record Number: [id number]  Cecile Mays  YOB: 1955   Phone: [phone] (home)   Primary Provider: Valdez Hurd     Diagnosis:  OAB     Surgeon: Nato Ibrahim MD  Surgical Procedure: Cysto botox  BMI:  27.44  ICD-10 Coded: N32.81  Hospital: [facility]  In-Patient/ Out-Patient status: Outpatient  Length of surgery: 30 minutes  Anesthesia: n/a  Implanted Cardiac Device: No     Date of Surgery:  04/01/2025  When post-op appointment needed:  2 weeks post procedure  Special Requests/ Equipment:: n/a  CPM Needed: n/a  Requestor:  Miriam Santos MA

## 2025-03-31 NOTE — TELEPHONE ENCOUNTER
"  *Please note the [facility] team only secures for the Cysto portion of the procedure. Sharmila mckoy contact the [facility] CAM team to secure for the Botox portion of the procedure.     Also, when possible please send requests at least 2 weeks ahead of time to allow adequate time for the plan to process the requests.       [facility] OP Final Note    DOS:    4/1/25  Payor:    Medicare    Procedure Description: Cysto  Procedure CPTs:  17018  Source of CPTs:  Cheat Sheet  Diagnosis/ICDs: N32.81       Eligibility Status:  TALA Verified    Prior Auth Status:  No PA required   Prior Auth Contact/Ref #:  CMS  Prior Auth Phone:    Prior Auth CPTs:  N/A  Prior Auth Number:    Prior Auth Date Range:  N/A    Medical Necessity:   Passed  Policy Link:   https://www.cms.gov/medicare-coverage-database/view/article.aspx?ycaejhyje=[email or web address]&fabiana=37&keyword=&keywordType=starts&areaId=[email or web address]&docType=6,3,5,1,F,P&contractOption=all&hcpcsOption=code&apvpyVjaxyWqqd=71112&jwkbkXhwDmns=60532&sortBy=title&bc=1    Colonoscopy Next Elig Date: N/A    Scanned into OnBase:           N/A    Followed Deferral Process: N/A    Denial Reason/Other Info: N/A         Please advise the patient to contact their insurance for coverage and benefits. Prior authorization or verified \"no prior authorization required\" is not a guarantee of payment or coverage. Payment is based on the patient's benefit plan documents and the adherance to said documents at the time of service.    Thank you,    MISSAEL Patel  Authorization Specialist II  [employer]   P: [phone]  F: [phone]    For an estimate:   patients can contact the Cost of Care department at:  Phone: [phone] or online: [email or web address].     For  [employer] billing questions:   patients should contact the number listed on their bill or contact [phone].           "

## 2025-04-01 ENCOUNTER — OFFICE VISIT (OUTPATIENT)
Dept: UROLOGY | Facility: CLINIC | Age: 70
End: 2025-04-01
Payer: MEDICARE

## 2025-04-01 VITALS
SYSTOLIC BLOOD PRESSURE: 118 MMHG | TEMPERATURE: 98.1 F | BODY MASS INDEX: 27.32 KG/M2 | WEIGHT: 170 LBS | HEIGHT: 66 IN | DIASTOLIC BLOOD PRESSURE: 80 MMHG | HEART RATE: 74 BPM

## 2025-04-01 DIAGNOSIS — N32.81 OAB (OVERACTIVE BLADDER): Primary | ICD-10-CM

## 2025-04-01 PROCEDURE — 3074F SYST BP LT 130 MM HG: CPT | Performed by: UROLOGY

## 2025-04-01 PROCEDURE — 52287 CYSTOSCOPY CHEMODENERVATION: CPT | Performed by: UROLOGY

## 2025-04-01 PROCEDURE — 1126F AMNT PAIN NOTED NONE PRSNT: CPT | Performed by: UROLOGY

## 2025-04-01 PROCEDURE — 3079F DIAST BP 80-89 MM HG: CPT | Performed by: UROLOGY

## 2025-04-01 ASSESSMENT — PAIN SCALES - GENERAL: PAINLEVEL_OUTOF10: NO PAIN (0)

## 2025-04-01 NOTE — PROGRESS NOTES
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  Informed consent obtained.     Botox A 100 units in 5 ml saline injected into single site in posterior wall; excellent bleb and hemostasis.           Current Facility-Administered Medications          Current Outpatient Medications   Medication Sig Dispense Refill    amoxicillin (AMOXIL) 500 MG capsule TAKE ONE CAPSULE BY MOUTH EVERY 8 HOURS UNTIL GONE        aspirin 81 MG tablet Take 1 tablet (81 mg) by mouth daily 90 tablet 3    Cranberry 500 MG CAPS          estradiol (ESTRACE) 0.1 MG/GM vaginal cream Place 0.5 g vaginally three times a week 42.5 g 1    Krill Oil Omega-3 500 MG CAPS 2 daily        levofloxacin (LEVAQUIN) 250 MG tablet Take 1 tablet (250 mg) by mouth daily. 3 tablet 0    montelukast (SINGULAIR) 10 MG tablet TAKE ONE TABLET BY MOUTH AT BEDTIME WHEN ALLERGIES ARE PRESENT 60 tablet 1    REBIF REBIDOSE 44 MCG/0.5ML SOAJ Inject 44 mcg Subcutaneous three times a week 6 mL 11    sertraline (ZOLOFT) 100 MG tablet TAKE 1 & 1/2 TABLETS BY MOUTH ONCE DAILY 135 tablet 0    SYMBICORT 160-4.5 MCG/ACT Inhaler Inhale 2 puffs by mouth twice daily 11 g 0      No current facility-administered medications for this visit.               IMP:  Botox for neurogenic bladder        PLAN:  1. Discussed situation with patient in detail.  2. Precautions given in detail  3. Levoquin x 1  4. F/up with virtual visit ; will discuss the gravel bike ride at that point

## 2025-04-15 ENCOUNTER — VIRTUAL VISIT (OUTPATIENT)
Dept: UROLOGY | Facility: CLINIC | Age: 70
End: 2025-04-15
Payer: MEDICARE

## 2025-04-15 DIAGNOSIS — N31.9 NEUROGENIC BLADDER: Primary | ICD-10-CM

## 2025-04-15 PROCEDURE — 98005 SYNCH AUDIO-VIDEO EST LOW 20: CPT | Performed by: UROLOGY

## 2025-04-15 PROCEDURE — 1126F AMNT PAIN NOTED NONE PRSNT: CPT | Mod: 95 | Performed by: UROLOGY

## 2025-04-15 NOTE — MR AVS SNAPSHOT
After Visit Summary   1/8/2018    Cecile Mays    MRN: [id number]           Patient Information     Date Of Birth          1955        Visit Information        Provider Department      1/8/2018 11:00 AM Tommy Pringle MD [facility]        Today's Diagnoses     Multiple sclerosis (H)    -  1    Routine general medical examination at a health care facility        Adjustment disorder with anxious mood          Care Instructions    Janell lopez page yoga, check on line about this stretching is the most essential  Begin sertraline 25-50 mg and after 2-4 weeks consider 50 mg  This will be mostly for sleep but it may raise mood  Use diclofenac twice daily, every day if needed          Follow-ups after your visit        Who to contact     If you have questions or need follow up information about today's clinic visit or your schedule please contact [facility] directly at [phone].  Normal or non-critical lab and imaging results will be communicated to you by MyChart, letter or phone within 4 business days after the clinic has received the results. If you do not hear from us within 7 days, please contact the clinic through [email or web address] or phone. If you have a critical or abnormal lab result, we will notify you by phone as soon as possible.  Submit refill requests through [employer] or call your pharmacy and they will forward the refill request to us. Please allow 3 business days for your refill to be completed.          Additional Information About Your Visit        MyChart Information     [employer] gives you secure access to your electronic health record. If you see a primary care provider, you can also send messages to your care team and make appointments. If you have questions, please call your primary care clinic.  If you do not have a primary care provider, please call [phone] and they will assist you.        Care EveryWhere ID     This is your Care  Received form from [employer] form completed, faxed a copy sent for scanning and a copy placed in file cabinet.    "EveryWhere ID. This could be used by other organizations to access your [place] medical records  [id number]        Your Vitals Were     Pulse Temperature Respirations Height Last Period Pulse Oximetry    72 97.1  F (36.2  C) (Temporal) 16 5' 6.5\" (1.689 m) 02/05/2008 98%    BMI (Body Mass Index)                   27.82 kg/m2            Blood Pressure from Last 3 Encounters:   01/08/18 136/66   01/27/17 136/70   12/15/16 136/74    Weight from Last 3 Encounters:   01/08/18 175 lb (79.4 kg)   01/27/17 169 lb (76.7 kg)   12/15/16 174 lb 9.6 oz (79.2 kg)              We Performed the Following     Basic metabolic panel     CBC with platelets          Today's Medication Changes          These changes are accurate as of: 1/8/18 12:20 PM.  If you have any questions, ask your nurse or doctor.               Start taking these medicines.        Dose/Directions    diclofenac 75 MG EC tablet   Commonly known as:  VOLTAREN   Used for:  Multiple sclerosis (H)   Started by:  Tommy Pringle MD        Dose:  75 mg   Take 1 tablet (75 mg) by mouth 2 times daily   Quantity:  60 tablet   Refills:  1       sertraline 50 MG tablet   Commonly known as:  ZOLOFT   Used for:  Adjustment disorder with anxious mood   Started by:  Tommy Pringle MD        Dose:  50 mg   Take 1 tablet (50 mg) by mouth daily   Quantity:  30 tablet   Refills:  1         These medicines have changed or have updated prescriptions.        Dose/Directions    traZODone 50 MG tablet   Commonly known as:  DESYREL   This may have changed:  See the new instructions.   Used for:  Adjustment disorder with anxious mood   Changed by:  Tommy Pringle MD        TAKE 1/2 TABLET BY MOUTH NIGHTLY AS NEEDED FOR SLEEP   Quantity:  15 tablet   Refills:  4         Stop taking these medicines if you haven't already. Please contact your care team if you have questions.     COPAXONE 20 MG/ML injection   Generic drug:  glatiramer   Stopped by:  Tommy Pringle MD     "       omega 3 1000 MG Caps   Stopped by:  Tommy Pringle MD                Where to get your medicines      These medications were sent to [facility] - [place], [place] - [address]  [address], [facility] [address]     Phone:  [phone]     diclofenac 75 MG EC tablet    sertraline 50 MG tablet    traZODone 50 MG tablet                Primary Care Provider Office Phone # Fax #    Tommy Pringle [phone]       [address] [place] [address]        Equal Access to Services     [facility]: Hadii aad ku hadasho Soomaali, waaxda luqadaha, qaybta kaalmada adeegyada, waxay idiin hayaan padma kharajennifer renee . So [facility] [phone].    ATENCIÓN: Si habla español, tiene a godinez disposición servicios gratuitos de asistencia lingüística. [facility] [phone].    We comply with applicable federal civil rights laws and [place] laws. We do not discriminate on the basis of race, color, national origin, age, disability, sex, sexual orientation, or gender identity.            Thank you!     Thank you for choosing [facility]  for your care. Our goal is always to provide you with excellent care. Hearing back from our patients is one way we can continue to improve our services. Please take a few minutes to complete the written survey that you may receive in the mail after your visit with us. Thank you!             Your Updated Medication List - Protect others around you: Learn how to safely use, store and throw away your medicines at www.disposemymeds.org.          This list is accurate as of: 1/8/18 12:20 PM.  Always use your most recent med list.                   Brand Name Dispense Instructions for use Diagnosis    albuterol 108 (90 BASE) MCG/ACT Inhaler    PROAIR HFA/PROVENTIL HFA/VENTOLIN HFA    1 Inhaler    Inhale 2 puffs into the lungs every 6 hours as needed for shortness of breath / dyspnea or wheezing    Acute bronchospasm       aspirin 81 MG tablet     90  tablet    Take 1 tablet (81 mg) by mouth daily    Routine general medical examination at a health care facility       B-12 1000 MCG Tbcr     100 tablet    Take 1,000 mcg by mouth daily        busPIRone 15 MG tablet    BUSPAR    270 tablet    2 tablets in a.m and one tablet in p.m.    Adjustment disorder with anxious mood       CALCIUM 600/VITAMIN D3 600-800 MG-UNIT Tabs   Generic drug:  Calcium Carb-Cholecalciferol      daily        calcium carbonate 1250 MG tablet    OS-RAIN 500 mg [place]. Ca     Take 1 tablet (500 mg) by mouth daily        diclofenac 75 MG EC tablet    VOLTAREN    60 tablet    Take 1 tablet (75 mg) by mouth 2 times daily    Multiple sclerosis (H)       glucosamine msm complex Tabs tablet     100 tablet    Take 1 tablet by mouth daily        Interferon Beta-1a 44 MCG/0.5ML Soaj      Taking Mon Weds and Friday        Krill Oil Omega-3 500 MG Caps      2 daily        LORazepam 1 MG tablet    ATIVAN    20 tablet    Take 1 tablet (1 mg) by mouth every 8 hours as needed for anxiety    Fifth cranial nerve disease or syndrome       sertraline 50 MG tablet    ZOLOFT    30 tablet    Take 1 tablet (50 mg) by mouth daily    Adjustment disorder with anxious mood       traZODone 50 MG tablet    DESYREL    15 tablet    TAKE 1/2 TABLET BY MOUTH NIGHTLY AS NEEDED FOR SLEEP    Adjustment disorder with anxious mood       vitamin D 2000 UNITS tablet     100 tablet    3,000 units daily    Major depressive disorder, recurrent episode, mild (H)       WOMENS DAILY MULTIVITAMIN Tabs     30    1 TABLET DAILY

## 2025-04-15 NOTE — PROGRESS NOTES
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    Drinks occas coffee, 1 tea, 3-4 [place] per day.    Using Botox about 1-2 x per week      IMP:  Neurogenic bladder, doing well on Botox    PLAN:  1. RTC 4-5 mos for next Botox (at pt request)  2. Encouraged Botox thrice weekly  3. Total time spent in reviewing patient's previous records, discussion with patient and documentation: 20 minutes                        "

## 2025-05-23 ENCOUNTER — HOSPITAL ENCOUNTER (OUTPATIENT)
Dept: MAMMOGRAPHY | Facility: CLINIC | Age: 70
Discharge: HOME | End: 2025-05-23
Attending: FAMILY MEDICINE | Admitting: FAMILY MEDICINE
Payer: MEDICARE

## 2025-05-23 DIAGNOSIS — Z12.31 VISIT FOR SCREENING MAMMOGRAM: ICD-10-CM

## 2025-05-23 PROCEDURE — 77063 BREAST TOMOSYNTHESIS BI: CPT

## 2025-07-21 SDOH — HEALTH STABILITY: PHYSICAL HEALTH: ON AVERAGE, HOW MANY MINUTES DO YOU ENGAGE IN EXERCISE AT THIS LEVEL?: PATIENT DECLINED

## 2025-07-21 ASSESSMENT — ASTHMA QUESTIONNAIRES
ACT_TOTALSCORE: 21
QUESTION_2 LAST FOUR WEEKS HOW OFTEN HAVE YOU HAD SHORTNESS OF BREATH: NOT AT ALL
QUESTION_4 LAST FOUR WEEKS HOW OFTEN HAVE YOU USED YOUR RESCUE INHALER OR NEBULIZER MEDICATION (SUCH AS ALBUTEROL): TWO OR THREE TIMES PER WEEK
QUESTION_1 LAST FOUR WEEKS HOW MUCH OF THE TIME DID YOUR ASTHMA KEEP YOU FROM GETTING AS MUCH DONE AT WORK, SCHOOL OR AT HOME: NONE OF THE TIME
QUESTION_5 LAST FOUR WEEKS HOW WOULD YOU RATE YOUR ASTHMA CONTROL: WELL CONTROLLED
QUESTION_3 LAST FOUR WEEKS HOW OFTEN DID YOUR ASTHMA SYMPTOMS (WHEEZING, COUGHING, SHORTNESS OF BREATH, CHEST TIGHTNESS OR PAIN) WAKE YOU UP AT NIGHT OR EARLIER THAN USUAL IN THE MORNING: ONCE OR TWICE

## 2025-07-21 ASSESSMENT — SOCIAL DETERMINANTS OF HEALTH (SDOH): HOW OFTEN DO YOU GET TOGETHER WITH FRIENDS OR RELATIVES?: PATIENT DECLINED

## 2025-07-22 ENCOUNTER — OFFICE VISIT (OUTPATIENT)
Dept: FAMILY MEDICINE | Facility: CLINIC | Age: 70
End: 2025-07-22
Payer: MEDICARE

## 2025-07-22 VITALS
HEART RATE: 75 BPM | TEMPERATURE: 98 F | WEIGHT: 181 LBS | RESPIRATION RATE: 14 BRPM | HEIGHT: 66 IN | OXYGEN SATURATION: 99 % | DIASTOLIC BLOOD PRESSURE: 68 MMHG | BODY MASS INDEX: 29.09 KG/M2 | SYSTOLIC BLOOD PRESSURE: 110 MMHG

## 2025-07-22 DIAGNOSIS — Z00.00 ENCOUNTER FOR MEDICARE ANNUAL WELLNESS EXAM: Primary | ICD-10-CM

## 2025-07-22 DIAGNOSIS — J30.9 ALLERGIC RHINITIS, UNSPECIFIED SEASONALITY, UNSPECIFIED TRIGGER: ICD-10-CM

## 2025-07-22 DIAGNOSIS — Z86.018 HISTORY OF DYSPLASTIC NEVUS: ICD-10-CM

## 2025-07-22 DIAGNOSIS — G35 MULTIPLE SCLEROSIS (H): ICD-10-CM

## 2025-07-22 DIAGNOSIS — J45.901 MODERATE ASTHMA WITH EXACERBATION, UNSPECIFIED WHETHER PERSISTENT: ICD-10-CM

## 2025-07-22 DIAGNOSIS — E78.5 HYPERLIPIDEMIA LDL GOAL <130: ICD-10-CM

## 2025-07-22 DIAGNOSIS — F33.0 MAJOR DEPRESSIVE DISORDER, RECURRENT EPISODE, MILD: ICD-10-CM

## 2025-07-22 DIAGNOSIS — H53.9 VISION CHANGES: ICD-10-CM

## 2025-07-22 DIAGNOSIS — Z23 NEED FOR VACCINATION: ICD-10-CM

## 2025-07-22 DIAGNOSIS — N39.41 URGE INCONTINENCE: ICD-10-CM

## 2025-07-22 LAB
ANION GAP SERPL CALCULATED.3IONS-SCNC: 9 MMOL/L (ref 7–15)
BUN SERPL-MCNC: 17.2 MG/DL (ref 8–23)
CALCIUM SERPL-MCNC: 9.6 MG/DL (ref 8.8–10.4)
CHLORIDE SERPL-SCNC: 105 MMOL/L (ref 98–107)
CHOLEST SERPL-MCNC: 201 MG/DL
CREAT SERPL-MCNC: 0.84 MG/DL (ref 0.51–0.95)
EGFRCR SERPLBLD CKD-EPI 2021: 75 ML/MIN/1.73M2
FASTING STATUS PATIENT QL REPORTED: NO
FASTING STATUS PATIENT QL REPORTED: NO
GLUCOSE SERPL-MCNC: 132 MG/DL (ref 70–99)
HCO3 SERPL-SCNC: 26 MMOL/L (ref 22–29)
HDLC SERPL-MCNC: 42 MG/DL
LDLC SERPL CALC-MCNC: 123 MG/DL
NONHDLC SERPL-MCNC: 159 MG/DL
POTASSIUM SERPL-SCNC: 3.3 MMOL/L (ref 3.4–5.3)
SODIUM SERPL-SCNC: 140 MMOL/L (ref 135–145)
TRIGL SERPL-MCNC: 182 MG/DL

## 2025-07-22 PROCEDURE — 99214 OFFICE O/P EST MOD 30 MIN: CPT | Mod: 25 | Performed by: STUDENT IN AN ORGANIZED HEALTH CARE EDUCATION/TRAINING PROGRAM

## 2025-07-22 PROCEDURE — 80061 LIPID PANEL: CPT | Performed by: STUDENT IN AN ORGANIZED HEALTH CARE EDUCATION/TRAINING PROGRAM

## 2025-07-22 PROCEDURE — G0439 PPPS, SUBSEQ VISIT: HCPCS | Performed by: STUDENT IN AN ORGANIZED HEALTH CARE EDUCATION/TRAINING PROGRAM

## 2025-07-22 PROCEDURE — 3049F LDL-C 100-129 MG/DL: CPT | Performed by: STUDENT IN AN ORGANIZED HEALTH CARE EDUCATION/TRAINING PROGRAM

## 2025-07-22 PROCEDURE — 3078F DIAST BP <80 MM HG: CPT | Performed by: STUDENT IN AN ORGANIZED HEALTH CARE EDUCATION/TRAINING PROGRAM

## 2025-07-22 PROCEDURE — 36415 COLL VENOUS BLD VENIPUNCTURE: CPT | Performed by: STUDENT IN AN ORGANIZED HEALTH CARE EDUCATION/TRAINING PROGRAM

## 2025-07-22 PROCEDURE — 80048 BASIC METABOLIC PNL TOTAL CA: CPT | Performed by: STUDENT IN AN ORGANIZED HEALTH CARE EDUCATION/TRAINING PROGRAM

## 2025-07-22 PROCEDURE — 1126F AMNT PAIN NOTED NONE PRSNT: CPT | Performed by: STUDENT IN AN ORGANIZED HEALTH CARE EDUCATION/TRAINING PROGRAM

## 2025-07-22 PROCEDURE — 3074F SYST BP LT 130 MM HG: CPT | Performed by: STUDENT IN AN ORGANIZED HEALTH CARE EDUCATION/TRAINING PROGRAM

## 2025-07-22 PROCEDURE — G2211 COMPLEX E/M VISIT ADD ON: HCPCS | Performed by: STUDENT IN AN ORGANIZED HEALTH CARE EDUCATION/TRAINING PROGRAM

## 2025-07-22 RX ORDER — UBIDECARENONE 75 MG
CAPSULE ORAL
COMMUNITY
Start: 2023-02-01

## 2025-07-22 RX ORDER — UBIDECARENONE 100 MG
CAPSULE ORAL
COMMUNITY
Start: 2023-02-01

## 2025-07-22 RX ORDER — SERTRALINE HYDROCHLORIDE 100 MG/1
150 TABLET, FILM COATED ORAL DAILY
Qty: 135 TABLET | Refills: 3 | Status: SHIPPED | OUTPATIENT
Start: 2025-07-22

## 2025-07-22 ASSESSMENT — ANXIETY QUESTIONNAIRES
GAD7 TOTAL SCORE: 0
7. FEELING AFRAID AS IF SOMETHING AWFUL MIGHT HAPPEN: NOT AT ALL
6. BECOMING EASILY ANNOYED OR IRRITABLE: NOT AT ALL
3. WORRYING TOO MUCH ABOUT DIFFERENT THINGS: NOT AT ALL
4. TROUBLE RELAXING: NOT AT ALL
GAD7 TOTAL SCORE: 0
2. NOT BEING ABLE TO STOP OR CONTROL WORRYING: NOT AT ALL
GAD7 TOTAL SCORE: 0
7. FEELING AFRAID AS IF SOMETHING AWFUL MIGHT HAPPEN: NOT AT ALL
IF YOU CHECKED OFF ANY PROBLEMS ON THIS QUESTIONNAIRE, HOW DIFFICULT HAVE THESE PROBLEMS MADE IT FOR YOU TO DO YOUR WORK, TAKE CARE OF THINGS AT HOME, OR GET ALONG WITH OTHER PEOPLE: NOT DIFFICULT AT ALL
5. BEING SO RESTLESS THAT IT IS HARD TO SIT STILL: NOT AT ALL
8. IF YOU CHECKED OFF ANY PROBLEMS, HOW DIFFICULT HAVE THESE MADE IT FOR YOU TO DO YOUR WORK, TAKE CARE OF THINGS AT HOME, OR GET ALONG WITH OTHER PEOPLE?: NOT DIFFICULT AT ALL
1. FEELING NERVOUS, ANXIOUS, OR ON EDGE: NOT AT ALL

## 2025-07-22 ASSESSMENT — PAIN SCALES - GENERAL: PAINLEVEL_OUTOF10: NO PAIN (0)

## 2025-07-22 NOTE — PROGRESS NOTES
"Preventive Care Visit  [facility]  Jamari Castellanos MD, Family Medicine  Jul 22, 2025      Assessment & Plan   Problem List Items Addressed This Visit          Respiratory/Allergy    Allergic rhinitis    Moderate asthma with exacerbation, unspecified whether persistent       Endocrine    RESOLVED: Hyperlipidemia LDL goal <130    Relevant Orders    Basic metabolic panel  (Ca, Cl, CO2, Creat, Gluc, K, Na, BUN) (Completed)    Lipid panel reflex to direct LDL Fasting (Completed)       Behavioral Health    Major depressive disorder, recurrent episode, mild    Relevant Medications    sertraline (ZOLOFT) 100 MG tablet       Neurology/Sleep    Multiple sclerosis (H)       Genitourinary/Renal    Urge incontinence       Dermatology    History of dysplastic nevus       Eye    Vision changes     Other Visit Diagnoses         Encounter for Medicare annual wellness exam    -  Primary      Need for vaccination               Age-appropriate screening and immunization discussed.  Continues to follow with neurology and ophthalmology.  She has follow-up with dermatology with history of dysplastic nevus.  Cardiovascular risk factors reviewed and plan to repeat lipids and diabetes screen today.  Asthma stable with inhaler as needed.  Mood doing well overall we will plan to continue sertraline.    Patient has been advised of split billing requirements and indicates understanding: Yes    The longitudinal plan of care for the diagnosis(es)/condition(s) as documented were addressed during this visit. Due to the added complexity in care, I will continue to support Yue in the subsequent management and with ongoing continuity of care.      BMI  Estimated body mass index is 28.88 kg/m  as calculated from the following:    Height as of this encounter: 1.686 m (5' 6.38\").    Weight as of this encounter: 82.1 kg (181 lb).   Weight management plan: Discussed healthy diet and exercise " guidelines    Counseling  Appropriate preventive services were addressed with this patient via screening, questionnaire, or discussion as appropriate for fall prevention, nutrition, physical activity, Tobacco-use cessation, social engagement, weight loss and cognition.  Checklist reviewing preventive services available has been given to the patient.  Reviewed patient's diet, addressing concerns and/or questions.   Reviewed preventive health counseling, as reflected in patient instructions       Regular exercise       Healthy diet/nutrition       Vision screening       Hearing screening       Alcohol use       Osteoporosis prevention/bone health       Safe sex practices/STD prevention       Colorectal Cancer Screening       (Ashli)menopause management       Advance Care Planning        Earl Turner is a 69 year old, presenting for the following:  Physical        7/22/2025    11:24 AM   Additional Questions   Roomed by HEBERT Bob         7/22/2025    11:24 AM   Patient Reported Additional Medications   Patient reports taking the following new medications Symibort, multivitamin, Calcium, Biotin, Co-Q 10          HPI     Dr. Chacon for the bladder and follows with Dr. Santoyo for Neurology. Following with ophthalmology and neurology. Still some double vision with left gaze. Unchanging without acute concerns.     Advance Care Planning    Discussed advance care planning with patient; however, patient declined at this time.        7/21/2025   General Health   How would you rate your overall physical health? (!) FAIR   Feel stress (tense, anxious, or unable to sleep) Patient declined         7/21/2025   Nutrition   Diet: Regular (no restrictions)         7/21/2025   Exercise   Days per week of moderate/strenous exercise Patient declined   Average minutes spent exercising at this level Patient declined         7/21/2025   Social Factors   Frequency of gathering with friends or relatives Patient declined   Worry  food won't last until get money to buy more No   Food not last or not have enough money for food? No   Do you have housing? (Housing is defined as stable permanent housing and does not include staying outside in a car, in a tent, in an abandoned building, in an overnight shelter, or couch-surfing.) Yes   Are you worried about losing your housing? No   Lack of transportation? No   Unable to get utilities (heat,electricity)? No         7/22/2025   Fall Risk   Gait Speed Test (Document in seconds) 3.58   Gait Speed Test Interpretation Less than or equal to 5.00 seconds - PASS          7/21/2025   Activities of Daily Living- Home Safety   Needs help with the following daily activites None of the above   Safety concerns in the home None of the above         7/21/2025   Dental   Dentist two times every year? Yes         7/21/2025   Hearing Screening   Hearing concerns? None of the above         7/21/2025   Driving Risk Screening   Patient/family members have concerns about driving No         7/21/2025   General Alertness/Fatigue Screening   Have you been more tired than usual lately? (!) DECLINE         7/21/2025   Urinary Incontinence Screening   Bothered by leaking urine in past 6 months No       Today's PHQ-9 Score:       7/21/2025    12:00 PM   PHQ-9 SCORE   PHQ-9 Total Score MyChart 0   PHQ-9 Total Score 0        Patient-reported         7/21/2025   Substance Use   Alcohol more than 3/day or more than 7/wk No   Do you have a current opioid prescription? No   How severe/bad is pain from 1 to 10? 0/10 (No Pain)   Do you use any other substances recreationally? (!) DECLINE     Social History     Tobacco Use    Smoking status: Never     Passive exposure: Never    Smokeless tobacco: Former   Vaping Use    Vaping status: Never Used   Substance Use Topics    Alcohol use: Not Currently     Comment: rarely.    Drug use: No           5/23/2025   LAST FHS-7 RESULTS   1st degree relative breast or ovarian cancer No   Any  relative bilateral breast cancer No   Any male have breast cancer No   Any ONE woman have BOTH breast AND ovarian cancer No   Any woman with breast cancer before 50yrs No   2 or more relatives with breast AND/OR ovarian cancer No   2 or more relatives with breast AND/OR bowel cancer No        Mammogram Screening - Mammogram every 1-2 years updated in Health Maintenance based on mutual decision making      History of abnormal Pap smear: No - age 65 or older with adequate negative prior screening test results (3 consecutive negative cytology results, 2 consecutive negative cotesting results, or 2 consecutive negative HrHPV test results within 10 years, with the most recent test occurring within the recommended screening interval for the test used)        Latest Ref Rng & Units 2018     8:30 AM 2018     8:11 AM 3/2/2015    12:00 AM   PAP / HPV   PAP (Historical)   NIL  NIL    HPV 16 DNA NEG^Negative Negative      HPV 18 DNA NEG^Negative Negative      Other HR HPV NEG^Negative Negative        ASCVD Risk   The 10-year ASCVD risk score (Mary Ann RUELAS, et al., 2019) is: 6.2%    Values used to calculate the score:      Age: 69 years      Sex: Female      Is Non- : No      Diabetic: No      Tobacco smoker: No      Systolic Blood Pressure: 110 mmHg      Is BP treated: No      HDL Cholesterol: 42 mg/dL      Total Cholesterol: 201 mg/dL    Fracture Risk Assessment Tool  Link to Frax Calculator  Use the information below to complete the Frax calculator  : 1955  Sex: female  Weight (kg): 82.1 kg (actual weight)  Height (cm): 168.6 cm  Previous Fragility Fracture:  No  History of parent with fractured hip:  No  Current Smoking:  No  Patient has been on glucocorticoids for more than 3 months (5mg/day or more): No  Rheumatoid Arthritis on Problem List:  No  Secondary Osteoporosis on Problem List:  No  Consumes 3 or more units of alcohol per day: No  Femoral Neck BMD (g/cm2)             Reviewed and updated as needed this visit by Provider                    Past Medical History:   Diagnosis Date    Allergy     Depressive disorder     On record    High cholesterol     Uncomplicated asthma     On record     Past Surgical History:   Procedure Laterality Date    COLONOSCOPY  1/24/2014    Procedure: COLONOSCOPY;  Colonoscopy;  Surgeon: Jose Martin Dye MD;  Location:  GI    COLONOSCOPY N/A 1/13/2025    Procedure: COLONOSCOPY, WITH POLYPECTOMY;  Surgeon: Braulio Roman DO;  Location:  GI    IR LUMBAR PUNCTURE  6/3/2016    LASIK      ZZC LIGATE FALLOPIAN TUBE  1970's     OB History   No obstetric history on file.     Current providers sharing in care for this patient include:  Patient Care Team:  Valdez Hurd MD as PCP - General (Family Medicine)  Ashely Kwon MD (Neurology)  Viraj Santoyo MD as Kevon Grier MD as MD (Ophthalmology)  Reynaldo Ho MD as MD (Ophthalmology)  Valdez Hurd MD as Assigned PCP  Kevon Fortune MD as MD (Ophthalmology)  Nato Ibrahim MD as MD (Urology)  Nato Ibrahim MD as Assigned Surgical Provider  Nato Ibrahim MD as MD (Urology)  Viraj Santoyo MD as Assigned Neuroscience Provider  Nato Ibrahim MD as MD (Urology)    The following health maintenance items are reviewed in Epic and correct as of today:  Health Maintenance   Topic Date Due    ASTHMA ACTION PLAN  Never done    RSV VACCINE (1 - Risk 60-74 years 1-dose series) Never done    COVID-19 VACCINE (8 - Moderna risk 2024-25 season) 03/18/2025    INFLUENZA VACCINE (1) 09/01/2025    LEATHA ASSESSMENT  01/22/2026    ASTHMA CONTROL TEST  01/22/2026    PHQ-9  01/22/2026    MEDICARE ANNUAL WELLNESS VISIT  07/22/2026    ANNUAL REVIEW OF HM ORDERS  07/22/2026    FALL RISK ASSESSMENT  07/22/2026    DTAP/TDAP/TD VACCINE (2 - Td or Tdap) 01/27/2027    MAMMO SCREENING  05/23/2027    DIABETES SCREENING   "07/22/2028    LIPID  07/22/2030    ADVANCE CARE PLANNING  07/22/2030    DEXA  12/08/2037    HEPATITIS C SCREENING  Completed    DEPRESSION ACTION PLAN  Completed    PNEUMOCOCCAL VACCINE 50+ YEARS  Completed    ZOSTER VACCINE  Completed    HPV VACCINE  Aged Out    MENINGITIS VACCINE  Aged Out    PAP  Discontinued    COLORECTAL CANCER SCREENING  Discontinued         Review of Systems  Constitutional, HEENT, cardiovascular, pulmonary, GI, , musculoskeletal, neuro, skin, endocrine and psych systems are negative, except as otherwise noted.     Objective    Exam  /68 (BP Location: Right arm)   Pulse 75   Temp 98  F (36.7  C) (Temporal)   Resp 14   Ht 1.686 m (5' 6.38\")   Wt 82.1 kg (181 lb)   LMP 02/05/2008   SpO2 99%   BMI 28.88 kg/m     Estimated body mass index is 28.88 kg/m  as calculated from the following:    Height as of this encounter: 1.686 m (5' 6.38\").    Weight as of this encounter: 82.1 kg (181 lb).    Physical Exam  GENERAL: alert and no distress  EYES: Eyes grossly normal to inspection, PERRL and conjunctivae and sclerae normal  HENT: ear canals and TM's normal, nose and mouth without ulcers or lesions  NECK: no adenopathy, no asymmetry, masses, or scars  RESP: lungs clear to auscultation - no rales, rhonchi or wheezes  CV: regular rate and rhythm, normal S1 S2, no S3 or S4, no murmur, click or rub, no peripheral edema  ABDOMEN: soft, nontender, no hepatosplenomegaly, no masses and bowel sounds normal  MS: no gross musculoskeletal defects noted, no edema  SKIN: no suspicious lesions or rashes  NEURO: Normal strength and tone, mentation intact and speech normal  PSYCH: mentation appears normal, affect normal/bright  Gait and balance assessed per Gait Speed Test.  Result as above.        7/22/2025   Mini Cog   Mini-Cog Not Completed (choose reason) Patient declines   Clock Draw Score 2 Normal   3 Item Recall 3 objects recalled   Mini Cog Total Score 5                Signed Electronically by: " Jamari Castellanos MD    Answers submitted by the patient for this visit:  Patient Health Questionnaire (Submitted on 7/21/2025)  If you checked off any problems, how difficult have these problems made it for you to do your work, take care of things at home, or get along with other people?: Not difficult at all  PHQ9 TOTAL SCORE: 0  Patient Health Questionnaire (G7) (Submitted on 7/22/2025)  LEATHA 7 TOTAL SCORE: 0

## 2025-07-22 NOTE — PATIENT INSTRUCTIONS
Patient Education   Preventive Care Advice   This is general advice given by our system to help you stay healthy. However, your care team may have specific advice just for you. Please talk to your care team about your preventive care needs.  Nutrition  Eat 5 or more servings of fruits and vegetables each day.  Try wheat bread, brown rice and whole grain pasta (instead of white bread, rice, and pasta).  Get enough calcium and vitamin D. Check the label on foods and aim for 100% of the RDA (recommended daily allowance).  Lifestyle  Exercise at least 150 minutes each week  (30 minutes a day, 5 days a week).  Do muscle strengthening activities 2 days a week. These help control your weight and prevent disease.  No smoking.  Wear sunscreen to prevent skin cancer.  Have a dental exam and cleaning every 6 months.  Yearly exams  See your health care team every year to talk about:  Any changes in your health.  Any medicines your care team has prescribed.  Preventive care, family planning, and ways to prevent chronic diseases.  Shots (vaccines)   HPV shots (up to age 26), if you've never had them before.  Hepatitis B shots (up to age 59), if you've never had them before.  COVID-19 shot: Get this shot when it's due.  Flu shot: Get a flu shot every year.  Tetanus shot: Get a tetanus shot every 10 years.  Pneumococcal, hepatitis A, and RSV shots: Ask your care team if you need these based on your risk.  Shingles shot (for age 50 and up)  General health tests  Diabetes screening:  Starting at age 35, Get screened for diabetes at least every 3 years.  If you are younger than age 35, ask your care team if you should be screened for diabetes.  Cholesterol test: At age 39, start having a cholesterol test every 5 years, or more often if advised.  Bone density scan (DEXA): At age 50, ask your care team if you should have this scan for osteoporosis (brittle bones).  Hepatitis C: Get tested at least once in your life.  STIs (sexually  transmitted infections)  Before age 24: Ask your care team if you should be screened for STIs.  After age 24: Get screened for STIs if you're at risk. You are at risk for STIs (including HIV) if:  You are sexually active with more than one person.  You don't use condoms every time.  You or a partner was diagnosed with a sexually transmitted infection.  If you are at risk for HIV, ask about PrEP medicine to prevent HIV.  Get tested for HIV at least once in your life, whether you are at risk for HIV or not.  Cancer screening tests  Cervical cancer screening: If you have a cervix, begin getting regular cervical cancer screening tests starting at age 21.  Breast cancer scan (mammogram): If you've ever had breasts, begin having regular mammograms starting at age 40. This is a scan to check for breast cancer.  Colon cancer screening: It is important to start screening for colon cancer at age 45.  Have a colonoscopy test every 10 years (or more often if you're at risk) Or, ask your provider about stool tests like a FIT test every year or Cologuard test every 3 years.  To learn more about your testing options, visit:   .  For help making a decision, visit:   https://bit.ly/pm66990.  Prostate cancer screening test: If you have a prostate, ask your care team if a prostate cancer screening test (PSA) at age 55 is right for you.  Lung cancer screening: If you are a current or former smoker ages 50 to 80, ask your care team if ongoing lung cancer screenings are right for you.  For informational purposes only. Not to replace the advice of your health care provider. Copyright   2023 [facility]. All rights reserved. Clinically reviewed by the [facility]. [employer] 301419 - REV 01/24.  Preventing Falls: Care Instructions  Injuries and health problems such as trouble walking or poor eyesight can increase your risk of falling. So can some medicines. But there are things you can do to help  "prevent falls. You can exercise to get stronger. You can also arrange your home to make it safer.    Talk to your doctor about the medicines you take. Ask if any of them increase the risk of falls and whether they can be changed or stopped.   Try to exercise regularly. It can help improve your strength and balance. This can help lower your risk of falling.         Practice fall safety and prevention.   Wear low-heeled shoes that fit well and give your feet good support. Talk to your doctor if you have foot problems that make this hard.  Carry a cellphone or wear a medical alert device that you can use to call for help.  Use stepladders instead of chairs to reach high objects. Don't climb if you're at risk for falls. Ask for help, if needed.  Wear the correct eyeglasses, if you need them.        Make your home safer.   Remove rugs, cords, clutter, and furniture from walkways.  Keep your house well lit. Use night-lights in hallways and bathrooms.  Install and use sturdy handrails on stairways.  Wear nonskid footwear, even inside. Don't walk barefoot or in socks without shoes.        Be safe outside.   Use handrails, curb cuts, and ramps whenever possible.  Keep your hands free by using a shoulder bag or backpack.  Try to walk in well-lit areas. Watch out for uneven ground, changes in pavement, and debris.  Be careful in the winter. Walk on the grass or gravel when sidewalks are slippery. Use de-icer on steps and walkways. Add non-slip devices to shoes.    Put grab bars and nonskid mats in your shower or tub and near the toilet. Try to use a shower chair or bath bench when bathing.   Get into a tub or shower by putting in your weaker leg first. Get out with your strong side first. Have a phone or medical alert device in the bathroom with you.   Where can you learn more?  Go to https://www.[employer].net/patiented  Enter G117 in the search box to learn more about \"Preventing Falls: Care Instructions.\"  Current as of: " July 31, 2024  Content Version: 14.5    5508-3979 [employer].   Care instructions adapted under license by your healthcare professional. If you have questions about a medical condition or this instruction, always ask your healthcare professional. [employer] disclaims any warranty or liability for your use of this information.

## 2025-07-23 ENCOUNTER — PATIENT OUTREACH (OUTPATIENT)
Dept: CARE COORDINATION | Facility: CLINIC | Age: 70
End: 2025-07-23
Payer: MEDICARE

## 2025-07-24 ENCOUNTER — MYC MEDICAL ADVICE (OUTPATIENT)
Dept: FAMILY MEDICINE | Facility: CLINIC | Age: 70
End: 2025-07-24
Payer: MEDICARE

## 2025-07-24 DIAGNOSIS — R73.09 ELEVATED GLUCOSE: Primary | ICD-10-CM

## 2025-08-06 ENCOUNTER — PATIENT OUTREACH (OUTPATIENT)
Dept: CARE COORDINATION | Facility: CLINIC | Age: 70
End: 2025-08-06

## 2025-08-06 ENCOUNTER — LAB (OUTPATIENT)
Dept: LAB | Facility: CLINIC | Age: 70
End: 2025-08-06
Payer: MEDICARE

## 2025-08-06 DIAGNOSIS — R73.09 ELEVATED GLUCOSE: ICD-10-CM

## 2025-08-06 LAB
EST. AVERAGE GLUCOSE BLD GHB EST-MCNC: 114 MG/DL
HBA1C MFR BLD: 5.6 %

## 2025-08-06 PROCEDURE — 83036 HEMOGLOBIN GLYCOSYLATED A1C: CPT

## 2025-08-06 PROCEDURE — 36415 COLL VENOUS BLD VENIPUNCTURE: CPT

## 2025-08-19 ENCOUNTER — TELEPHONE (OUTPATIENT)
Dept: UROLOGY | Facility: CLINIC | Age: 70
End: 2025-08-19
Payer: MEDICARE

## 2025-08-19 DIAGNOSIS — N32.81 OAB (OVERACTIVE BLADDER): Primary | ICD-10-CM

## 2025-09-02 ENCOUNTER — OFFICE VISIT (OUTPATIENT)
Dept: UROLOGY | Facility: CLINIC | Age: 70
End: 2025-09-02
Payer: MEDICARE

## 2025-09-02 VITALS
WEIGHT: 181 LBS | BODY MASS INDEX: 29.09 KG/M2 | SYSTOLIC BLOOD PRESSURE: 112 MMHG | HEIGHT: 66 IN | TEMPERATURE: 97 F | DIASTOLIC BLOOD PRESSURE: 72 MMHG

## 2025-09-02 DIAGNOSIS — N32.81 OAB (OVERACTIVE BLADDER): Primary | ICD-10-CM

## 2025-09-02 RX ORDER — LEVOFLOXACIN 250 MG/1
250 TABLET, FILM COATED ORAL DAILY
Qty: 3 TABLET | Refills: 0 | Status: SHIPPED | OUTPATIENT
Start: 2025-09-02

## 2025-09-02 ASSESSMENT — PAIN SCALES - GENERAL: PAINLEVEL_OUTOF10: NO PAIN (0)

## (undated) DEVICE — SOL WATER IRRIG 1000ML BOTTLE 2F7114

## (undated) DEVICE — ENDO SNARE EXACTO COLD 9MM LOOP 2.4MMX230CM 00711115

## (undated) DEVICE — KIT ENDO TURNOVER/PROCEDURE CARRY-ON 101822

## (undated) DEVICE — TUBING SUCTION 6"X3/16" N56A

## (undated) DEVICE — ENDO TRAP POLYP E-TRAP 00711099